# Patient Record
Sex: MALE | Race: WHITE | NOT HISPANIC OR LATINO | Employment: FULL TIME | URBAN - METROPOLITAN AREA
[De-identification: names, ages, dates, MRNs, and addresses within clinical notes are randomized per-mention and may not be internally consistent; named-entity substitution may affect disease eponyms.]

---

## 2020-02-12 ENCOUNTER — OFFICE VISIT (OUTPATIENT)
Dept: FAMILY MEDICINE | Facility: CLINIC | Age: 65
End: 2020-02-12
Payer: COMMERCIAL

## 2020-02-12 VITALS
OXYGEN SATURATION: 96 % | RESPIRATION RATE: 16 BRPM | HEART RATE: 66 BPM | SYSTOLIC BLOOD PRESSURE: 102 MMHG | WEIGHT: 223.6 LBS | TEMPERATURE: 97.8 F | BODY MASS INDEX: 33.12 KG/M2 | DIASTOLIC BLOOD PRESSURE: 68 MMHG | HEIGHT: 69 IN

## 2020-02-12 DIAGNOSIS — L21.9 SEBORRHEIC DERMATITIS: ICD-10-CM

## 2020-02-12 DIAGNOSIS — Z23 NEED FOR PNEUMOCOCCAL VACCINE: ICD-10-CM

## 2020-02-12 DIAGNOSIS — Z11.59 NEED FOR HEPATITIS C SCREENING TEST: ICD-10-CM

## 2020-02-12 DIAGNOSIS — Z00.00 ENCOUNTER FOR MEDICARE ANNUAL WELLNESS EXAM: Primary | ICD-10-CM

## 2020-02-12 DIAGNOSIS — I21.3 ST ELEVATION MYOCARDIAL INFARCTION (STEMI), UNSPECIFIED ARTERY (H): ICD-10-CM

## 2020-02-12 DIAGNOSIS — Z83.3 FAMILY HISTORY OF DIABETES MELLITUS: ICD-10-CM

## 2020-02-12 DIAGNOSIS — Z11.4 SCREENING FOR HIV (HUMAN IMMUNODEFICIENCY VIRUS): ICD-10-CM

## 2020-02-12 DIAGNOSIS — Z12.5 SCREENING FOR PROSTATE CANCER: ICD-10-CM

## 2020-02-12 DIAGNOSIS — I10 ESSENTIAL HYPERTENSION: ICD-10-CM

## 2020-02-12 DIAGNOSIS — E78.5 HYPERLIPIDEMIA LDL GOAL <100: ICD-10-CM

## 2020-02-12 PROCEDURE — 90670 PCV13 VACCINE IM: CPT | Performed by: NURSE PRACTITIONER

## 2020-02-12 PROCEDURE — 99387 INIT PM E/M NEW PAT 65+ YRS: CPT | Mod: 25 | Performed by: NURSE PRACTITIONER

## 2020-02-12 PROCEDURE — 90471 IMMUNIZATION ADMIN: CPT | Performed by: NURSE PRACTITIONER

## 2020-02-12 RX ORDER — METOPROLOL SUCCINATE 25 MG/1
25 TABLET, EXTENDED RELEASE ORAL DAILY
COMMUNITY
Start: 2019-05-09 | End: 2020-08-19

## 2020-02-12 RX ORDER — ALCLOMETASONE DIPROPIONATE 0.5 MG/G
CREAM TOPICAL 2 TIMES DAILY
Qty: 45 G | Refills: 0 | Status: SHIPPED | OUTPATIENT
Start: 2020-02-12 | End: 2020-05-07

## 2020-02-12 RX ORDER — TERAZOSIN 2 MG/1
2 CAPSULE ORAL DAILY
COMMUNITY
Start: 2020-01-08 | End: 2020-08-19

## 2020-02-12 RX ORDER — LISINOPRIL 10 MG/1
10 TABLET ORAL 2 TIMES DAILY
COMMUNITY
Start: 2019-08-06 | End: 2020-06-18

## 2020-02-12 RX ORDER — CLOPIDOGREL BISULFATE 75 MG/1
75 TABLET ORAL DAILY
COMMUNITY
Start: 2018-12-20 | End: 2020-02-13

## 2020-02-12 RX ORDER — NITROGLYCERIN 0.4 MG/1
0.4 TABLET SUBLINGUAL PRN
COMMUNITY
Start: 2019-12-02 | End: 2020-09-10

## 2020-02-12 RX ORDER — ATORVASTATIN CALCIUM 80 MG/1
80 TABLET, FILM COATED ORAL DAILY
COMMUNITY
Start: 2018-12-19 | End: 2020-08-19

## 2020-02-12 RX ORDER — DIPHENOXYLATE HYDROCHLORIDE AND ATROPINE SULFATE 2.5; .025 MG/1; MG/1
1 TABLET ORAL DAILY
COMMUNITY

## 2020-02-12 ASSESSMENT — ENCOUNTER SYMPTOMS
SHORTNESS OF BREATH: 0
NERVOUS/ANXIOUS: 0
FEVER: 0
EYE PAIN: 0
CHILLS: 0
HEADACHES: 0
WEAKNESS: 0
MYALGIAS: 0
ARTHRALGIAS: 0
ABDOMINAL PAIN: 0
CONSTIPATION: 0
SORE THROAT: 0
DYSURIA: 0
DIZZINESS: 0
FREQUENCY: 1
PARESTHESIAS: 1
COUGH: 0
NAUSEA: 0
DIARRHEA: 0
JOINT SWELLING: 0
HEARTBURN: 1
HEMATURIA: 0
HEMATOCHEZIA: 0
PALPITATIONS: 0

## 2020-02-12 ASSESSMENT — MIFFLIN-ST. JEOR: SCORE: 1791.11

## 2020-02-12 ASSESSMENT — ACTIVITIES OF DAILY LIVING (ADL): CURRENT_FUNCTION: NO ASSISTANCE NEEDED

## 2020-02-12 NOTE — NURSING NOTE
Prior to immunization administration, verified patients identity using patient s name and date of birth. Please see Immunization Activity for additional information.     Screening Questionnaire for Adult Immunization    Are you sick today?   No   Do you have allergies to medications, food, a vaccine component or latex?   No   Have you ever had a serious reaction after receiving a vaccination?   No   Do you have a long-term health problem with heart, lung, kidney, or metabolic disease (e.g., diabetes), asthma, a blood disorder, no spleen, complement component deficiency, a cochlear implant, or a spinal fluid leak?  Are you on long-term aspirin therapy?   Yes   Do you have cancer, leukemia, HIV/AIDS, or any other immune system problem?   No   Do you have a parent, brother, or sister with an immune system problem?   No   In the past 3 months, have you taken medications that affect  your immune system, such as prednisone, other steroids, or anticancer drugs; drugs for the treatment of rheumatoid arthritis, Crohn s disease, or psoriasis; or have you had radiation treatments?   No   Have you had a seizure, or a brain or other nervous system problem?   No   During the past year, have you received a transfusion of blood or blood    products, or been given immune (gamma) globulin or antiviral drug?   No   For women: Are you pregnant or is there a chance you could become       pregnant during the next month?   No   Have you received any vaccinations in the past 4 weeks?   No     Immunization questionnaire was positive for at least one answer.  Notified Dr. Cabello,     Patient instructed to remain in clinic for 15 minutes afterwards, and to report any adverse reaction to me immediately.       Screening performed by Sherlyn Valerio MA on 2/12/2020 at 12:37 PM.

## 2020-02-12 NOTE — PROGRESS NOTES
"SUBJECTIVE:   Pedrito Negron is a 64 year old male who presents for Preventive Visit.  {PVP to remind patient that this is not necessarily a physical exam; physical exam may or may not be done:919169::\"click delete button to remove this line now\"}  {PVP to inform patient that additional E&M charge may apply, if additional problems addressed:083234::\"click delete button to remove this line now\"}  Are you in the first 12 months of your Medicare coverage?  { :401989::\"No\"}    HPI  Do you feel safe in your environment? { :377941}    Have you ever done Advance Care Planning? (For example, a Health Directive, POLST, or a discussion with a medical provider or your loved ones about your wishes): { :779131}    {Hearing Test Done (Optional):933445}  Fall risk  { :698478}  {If any of the above assessments are answered yes, consider ordering appropriate referrals (Optional):985900::\"click delete button to remove this line now\"}  Cognitive Screening { :647992}    {Do you have sleep apnea, excessive snoring or daytime drowsiness? (Optional):544344}    Reviewed and updated as needed this visit by clinical staff         Reviewed and updated as needed this visit by Provider        Social History     Tobacco Use     Smoking status: Not on file   Substance Use Topics     Alcohol use: Not on file     {Rooming Staff- Complete this question if Prescreen response is not shown below for today's visit. If you drink alcohol do you typically have >3 drinks per day or >7 drinks per week? (Optional):792615}    No flowsheet data found.{add AUDIT responses (Optional) (A score of 7 for adult men is an indication of hazardous drinking; a score of 8 or more is an indication of an alcohol use disorder.  A score of 7 or more for adult women is an indication of hazardous drinking or an alchohol use disorder):168880}    {Outside tests to abstract? :672371}    {additional problems to add (Optional):239575}    Current providers sharing in care for this " "patient include: {Rooming staff:  Please update Care Team in Rooming Activity, refresh this note and then delete this statement}  Patient Care Team:  Vinay Garcia MD as PCP - General (Family Practice)    The following health maintenance items are reviewed in Epic and correct as of today:  Health Maintenance   Topic Date Due     HEPATITIS C SCREENING  1955     ADVANCE CARE PLANNING  1955     COLONOSCOPY  1964     HIV SCREENING  1969     LIPID  1989     MEDICARE ANNUAL WELLNESS VISIT  2019     FALL RISK ASSESSMENT  2019     AORTIC ANEURYSM SCREENING (SYSTEM ASSIGNED)  2019     PHQ-2  2020     PNEUMOCOCCAL IMMUNIZATION 65+ LOW/MEDIUM RISK (1 of 2 - PCV13) 2019     DTAP/TDAP/TD IMMUNIZATION (2 - Td) 2021     INFLUENZA VACCINE  Completed     ZOSTER IMMUNIZATION  Completed     IPV IMMUNIZATION  Aged Out     MENINGITIS IMMUNIZATION  Aged Out     {Chronicprobdata (optional):518451}  {Decision Support (Optional):783201}    Review of Systems  {ROS COMP (Optional):743761}    OBJECTIVE:   There were no vitals taken for this visit. There is no height or weight on file to calculate BMI.  Physical Exam  {Exam (Optional) :062826}    {Diagnostic Test Results (Optional):753393::\"Diagnostic Test Results:\",\"Labs reviewed in Epic\"}    ASSESSMENT / PLAN:   {Diag Picklist:003508}    COUNSELING:  {Medicare Counselin}    There is no height or weight on file to calculate BMI.    {Weight Management Plan (ACO) Complete if BMI is abnormal-  Ages 18-64  BMI >24.9.  Age 65+ with BMI <23 or >30 (Optional):449139}     has no history on file for tobacco.  {Tobacco Cessation -- Complete if patient is a smoker (Optional):712979}    Appropriate preventive services were discussed with this patient, including applicable screening as appropriate for cardiovascular disease, diabetes, osteopenia/osteoporosis, and glaucoma.  As appropriate for age/gender, discussed screening for " colorectal cancer, prostate cancer, breast cancer, and cervical cancer. Checklist reviewing preventive services available has been given to the patient.    Reviewed patients plan of care and provided an AVS. The {CarePlan:336524} for Pedrito meets the Care Plan requirement. This Care Plan has been established and reviewed with the {PATIENT, FAMILY MEMBER, CAREGIVER:182780}.    Counseling Resources:  ATP IV Guidelines  Pooled Cohorts Equation Calculator  Breast Cancer Risk Calculator  FRAX Risk Assessment  ICSI Preventive Guidelines  Dietary Guidelines for Americans, 2010  USDA's MyPlate  ASA Prophylaxis  Lung CA Screening    Lien Garcia, [facility]    Identified Health Risks:

## 2020-02-12 NOTE — PATIENT INSTRUCTIONS
Check your BP at home for one week on the log provided and follow-up for nurse visit - make sure you bring your at-home machine with you.  Fasting labs that same visit - ensure you have nothing to eat or drink after midnight.  Please follow-up with Dr. Garcia for additional concerns/lab results about one week after labs.    Please message me or Dr. Garcia after you have inventoried your medications and know which ones you need refills for.      Please call or return to clinic for any questions, concerns, or symptoms that are not improving or becoming worse.    Lein Garcia, CNP    Preventive Health Recommendations:     See your health care provider every year to    Review health changes.     Discuss preventive care.      Review your medicines if your doctor has prescribed any.      Talk with your health care provider about whether you should have a test to screen for prostate cancer (PSA).    Every 3 years, have a diabetes test (fasting glucose). If you are at risk for diabetes, you should have this test more often.    Every 5 years, have a cholesterol test. Have this test more often if you are at risk for high cholesterol or heart disease.     Every 10 years, have a colonoscopy. Or, have a yearly FIT test (stool test). These exams will check for colon cancer.    Talk to with your health care provider about screening for Abdominal Aortic Aneurysm if you have a family history of AAA or have a history of smoking.    Shots:     Get a flu shot each year.     Get a tetanus shot every 10 years.     Talk to your doctor about your pneumonia vaccines. There are now two you should receive - Pneumovax (PPSV 23) and Prevnar (PCV 13).     Talk to your pharmacist about a shingles vaccine.     Talk to your doctor about the hepatitis B vaccine.  Nutrition:     Eat at least 5 servings of fruits and vegetables each day.     Eat whole-grain bread, whole-wheat pasta and brown rice instead of white grains and rice.     Get  adequate Calcium and Vitamin D.   Lifestyle    Exercise for at least 150 minutes a week (30 minutes a day, 5 days a week). This will help you control your weight and prevent disease.     Limit alcohol to one drink per day.     No smoking.     Wear sunscreen to prevent skin cancer.    See your dentist every six months for an exam and cleaning.    See your eye doctor every 1 to 2 years to screen for conditions such as glaucoma, macular degeneration, cataracts, etc.    Personalized Prevention Plan  You are due for the preventive services outlined below.  Your care team is available to assist you in scheduling these services.  If you have already completed any of these items, please share that information with your care team to update in your medical record.  Health Maintenance Due   Topic Date Due     Hepatitis C Screening  1955     Discuss Advance Care Planning  1955     Colonoscopy  11/24/1964     Diptheria Tetanus Pertussis (DTAP/TDAP/TD) Vaccine (1 - Tdap) 11/24/1965     HIV Screening  11/24/1969     Cholesterol Lab  11/24/1989     Annual Wellness Visit  11/24/2019     FALL RISK ASSESSMENT  11/24/2019     AORTIC ANEURYSM SCREENING (SYSTEM ASSIGNED)  11/24/2019     PHQ-2  01/01/2020     Pneumococcal Vaccine (1 of 2 - PCV13) 11/24/2019     Patient Education   Personalized Prevention Plan  You are due for the preventive services outlined below.  Your care team is available to assist you in scheduling these services.  If you have already completed any of these items, please share that information with your care team to update in your medical record.  Health Maintenance Due   Topic Date Due     Hepatitis C Screening  1955     Discuss Advance Care Planning  1955     Colonoscopy  11/24/1964     HIV Screening  11/24/1969     Cholesterol Lab  11/24/1989     Annual Wellness Visit  11/24/2019     FALL RISK ASSESSMENT  11/24/2019     AORTIC ANEURYSM SCREENING (SYSTEM ASSIGNED)  11/24/2019     PHQ-2   01/01/2020     Pneumococcal Vaccine (1 of 2 - PCV13) 11/24/2019        Preventive Health Recommendations:     See your health care provider every year to    Review health changes.     Discuss preventive care.      Review your medicines if your doctor has prescribed any.      Talk with your health care provider about whether you should have a test to screen for prostate cancer (PSA).    Every 3 years, have a diabetes test (fasting glucose). If you are at risk for diabetes, you should have this test more often.    Every 5 years, have a cholesterol test. Have this test more often if you are at risk for high cholesterol or heart disease.     Every 10 years, have a colonoscopy. Or, have a yearly FIT test (stool test). These exams will check for colon cancer.    Talk to with your health care provider about screening for Abdominal Aortic Aneurysm if you have a family history of AAA or have a history of smoking.    Shots:     Get a flu shot each year.     Get a tetanus shot every 10 years.     Talk to your doctor about your pneumonia vaccines. There are now two you should receive - Pneumovax (PPSV 23) and Prevnar (PCV 13).     Talk to your pharmacist about a shingles vaccine.     Talk to your doctor about the hepatitis B vaccine.  Nutrition:     Eat at least 5 servings of fruits and vegetables each day.     Eat whole-grain bread, whole-wheat pasta and brown rice instead of white grains and rice.     Get adequate Calcium and Vitamin D.   Lifestyle    Exercise for at least 150 minutes a week (30 minutes a day, 5 days a week). This will help you control your weight and prevent disease.     Limit alcohol to one drink per day.     No smoking.     Wear sunscreen to prevent skin cancer.    See your dentist every six months for an exam and cleaning.    See your eye doctor every 1 to 2 years to screen for conditions such as glaucoma, macular degeneration, cataracts, etc.    Personalized Prevention Plan  You are due for the  preventive services outlined below.  Your care team is available to assist you in scheduling these services.  If you have already completed any of these items, please share that information with your care team to update in your medical record.  Health Maintenance Due   Topic Date Due     Hepatitis C Screening  1955     Discuss Advance Care Planning  1955     Colonoscopy  11/24/1964     HIV Screening  11/24/1969     Cholesterol Lab  11/24/1989     Annual Wellness Visit  11/24/2019     FALL RISK ASSESSMENT  11/24/2019     AORTIC ANEURYSM SCREENING (SYSTEM ASSIGNED)  11/24/2019     PHQ-2  01/01/2020     Pneumococcal Vaccine (1 of 2 - PCV13) 11/24/2019     Patient Education     Step-by-Step  Checking Your Blood Pressure    Date Last Reviewed: 4/27/2016 2000-2019 [employer]. [address] [place] [address], [place] [address]. All rights reserved. This information is not intended as a substitute for professional medical care. Always follow your healthcare professional's instructions.

## 2020-02-12 NOTE — PROGRESS NOTES
SUBJECTIVE:   CC: Pedrito Negron is an 64 year old male who presents for preventative health visit.     HPI  Ability to successfully perform activities of daily living: Yes, no assistance needed  Home safety:  none identified   Hearing impairment: No    Colonoscopy done on this date: 1/19/2016, by this group: [facility], results   Were:   Impression: - Patent end-to-side colo-colonic                        anastomosis, characterized by healthy                        appearing mucosa.                       - The examined portion of the ileum                        was normal.                       - Diverticulosis in the sigmoid                        colon, in the descending colon and in                        the ascending colon.                       - One 5 mm polyp in the ascending                        colon. Resected and retrieved.                       - One 3 mm polyp in the ascending                        colon. Resected and retrieved.                       - Two 4 to 5 mm polyps in the                        descending colon. Resected and                        retrieved.                       - One 5 mm polyp at the anastomosis.                        Resected and retrieved.                       - One 5 mm polyp in the rectum.                        Resected and retrieved.                       - The examination was otherwise                        normal.    Pathology Report 1/19/2016    Pathology #: [id number]                     Date Obtained: 01/19/2016                                                Date Received: 01/19/2016    DIAGNOSIS:   A. Colon, ascending, polypectomy:   1. Tubular adenoma, one piece.   2. Sessile serrated adenoma, one piece.     B. Colon, descending, polypectomy:   -  Hyperplastic polyp.     C. Colon, anastomosis, polypectomy:   -  Serrated polyp, favor a hyperplastic polyp.     D. Colon, rectum, polypectomy:   -  Hyperplastic polyp.    Recommendation:      -  "Await pathology results.                       - If the pathology report reveals                        adenomatous tissue, then repeat the                        colonoscopy for surveillance based on                        pathology results in 3 - 5 years.                       - No aspirin, ibuprofen, naproxen, or                        other non-steroidal anti-inflammatory                        drugs for 2 weeks after polyp removal.    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  - blood pressure readings at home 140/90, sometimes systolic of 150 - is asymptomatic   - he will take an extra Lisinopril   - no history of prostate cancer  - family history of diabetes (sister)  - on Plavix, has \"at least 3 stents\"  - no bleeding in stool      Today's PHQ-2 Score:   PHQ-2 (  [employer]) 2020   Q1: Little interest or pleasure in doing things 0   Q2: Feeling down, depressed or hopeless 0   PHQ-2 Score 0   Q1: Little interest or pleasure in doing things Not at all   Q2: Feeling down, depressed or hopeless Not at all   PHQ-2 Score 0       Abuse: Current or Past(Physical, Sexual or Emotional)- No  Do you feel safe in your environment? Yes    Have you ever done Advance Care Planning? (For example, a Health Directive, POLST, or a discussion with a medical provider or your loved ones about your wishes): No, advance care planning information given to patient to review.  Advanced care planning was discussed at today's visit.    Social History     Tobacco Use     Smoking status: Former Smoker     Types: Cigarettes     Last attempt to quit: 2001     Years since quittin.1     Smokeless tobacco: Never Used   Substance Use Topics     Alcohol use: Not Currently     If you drink alcohol do you typically have >3 drinks per day or >7 drinks per " week? No    Alcohol Use 2/12/2020   Prescreen: >3 drinks/day or >7 drinks/week? No   Prescreen: >3 drinks/day or >7 drinks/week? -       Last PSA: 5/18/2019 - 0.7    Reviewed orders with patient. Reviewed health maintenance and updated orders accordingly - Yes  - Patient is not fasting today, so will return to have them completed in 1-2 weeks.     Current Outpatient Medications   Medication Sig Dispense Refill     aspirin (ASA) 81 MG EC tablet Take 81 mg by mouth daily       atorvastatin (LIPITOR) 80 MG tablet Take 80 mg by mouth daily       clopidogrel (PLAVIX) 75 MG tablet Take 75 mg by mouth daily       lisinopril (PRINIVIL/ZESTRIL) 10 MG tablet Take 10 mg by mouth 2 times daily       metoprolol succinate ER (TOPROL-XL) 25 MG 24 hr tablet Take 25 mg by mouth daily       Multiple Vitamin (MULTI-VITAMINS) TABS Take 1 tablet by mouth daily       nitroGLYcerin (NITROSTAT) 0.4 MG sublingual tablet Place 0.4 mg under the tongue as needed One table under tongue as needed for chest pain- Inno relief after 5 minutes call 911; Use of 1 tablet every 5 mintues- Max of 3 tablets       omeprazole (PRILOSEC) 20 MG DR capsule Take 20 mg by mouth daily       terazosin (HYTRIN) 2 MG capsule Take 2 mg by mouth daily       No Known Allergies    Reviewed and updated as needed this visit by clinical staff  Tobacco  Allergies  Meds  Problems  Med Hx  Surg Hx  Fam Hx  Soc Hx          Reviewed and updated as needed this visit by Provider  Tobacco  Allergies  Meds  Problems  Med Hx  Surg Hx  Fam Hx        History reviewed. No pertinent past medical history.   History reviewed. No pertinent surgical history.    Review of Systems  CONSTITUTIONAL: NEGATIVE for fever, chills, change in weight  INTEGUMENTARY/SKIN: NEGATIVE for worrisome rashes, moles or lesions  EYES: NEGATIVE for vision changes or irritation  ENT: NEGATIVE for ear, mouth and throat problems  RESP: NEGATIVE for significant cough or SOB  CV: NEGATIVE for chest  "pain, palpitations or peripheral edema  GI: NEGATIVE for nausea, abdominal pain, heartburn, or change in bowel habits   male: negative for dysuria, hematuria, decreased urinary stream, erectile dysfunction, urethral discharge  MUSCULOSKELETAL: NEGATIVE for significant arthralgias or myalgia  NEURO: NEGATIVE for weakness, dizziness or paresthesias  PSYCHIATRIC: NEGATIVE for changes in mood or affect    OBJECTIVE:   /68   Pulse 66   Temp 97.8  F (36.6  C) (Temporal)   Resp 16   Ht 1.755 m (5' 9.09\")   Wt 101.4 kg (223 lb 9.6 oz)   SpO2 96%   BMI 32.93 kg/m      Physical Exam  GENERAL: healthy, alert and no distress  EYES: Eyes grossly normal to inspection, PERRL and conjunctivae and sclerae normal  HENT: ear canals and TM's normal, nose and mouth without ulcers or lesions  NECK: no adenopathy, no asymmetry, masses, or scars and thyroid normal to palpation  RESP: lungs clear to auscultation - no rales, rhonchi or wheezes  CV: regular rates and rhythm, normal S1 S2, no S3 or S4, no murmur, click or rub, peripheral pulses strong, no peripheral edema and no bruits heard over bilateral carotid arteries  ABDOMEN: soft, nontender, no hepatosplenomegaly, no masses and bowel sounds normal  MS: no gross musculoskeletal defects noted, no edema  SKIN: no suspicious lesions or rashes  NEURO: Normal strength and tone, mentation intact and speech normal  PSYCH: mentation appears normal, affect normal/bright    Diagnostic Test Results:  Labs reviewed in Epic  none     ASSESSMENT/PLAN:   1. Encounter for Medicare annual wellness exam  No abnormal exam findings.  The following labs are ordered for future as patient is not fasting today.  He is aware and made an appointment to have these completed on 2/19/2020.  - CBC with platelets and differential; Future  - Comprehensive metabolic panel; Future    2. Essential hypertension  Controlled.  Current medications include Lisinopril 10 mg BID and Metoprolol 25 mg daily.  No " changes made in medication/dose today.  He is going to keep a BP log for the next week and bring that back in with his BP machine for a nurse visit.  He does not need refills today.    3. Hyperlipidemia LDL goal <100  Controlled.  No change in medication today.  He does not need a refill at this time.   - Lipid panel reflex to direct LDL Fasting; Future  - Comprehensive metabolic panel; Future    4. ST elevation myocardial infarction (STEMI), unspecified artery (H)  Stable.  History of Inferior STEMI on 2/1/2019, s/p DIMITRI to CX, and DIMITRI x 2 to RCA, h/o CAD (prior LAD stent 2010).  Pedrito is on Plavix 75 mg daily, as well as ASA 81 mg.      5. Family history of diabetes mellitus  The following lab will be completed when he is fasting on 2/19/2020.  - Hemoglobin A1c; Future    6. Screening for HIV (human immunodeficiency virus)  Discussed one-time screening per CDC recommendation.  The following lab will be completed on 2/19/2020.  - HIV Screening; Future    7. Need for hepatitis C screening test  Discussed one-time screening for patients born between [birth year]67-1997 per CDC recommendation.  The following lab will be completed on 2/19/2020.  - Hepatitis C Screen Reflex to HCV RNA Quant and Genotype; Future    8. Need for pneumococcal vaccine  Administered today in clinic.  - Pneumococcal vaccine 13 valent (Prevnar)  - ADMIN:  Vaccine, Initial    9. Screening for prostate cancer  The following lab will be completed on 2/19/2020.  - PSA, screen; Future    10. Seborrheic dermatitis  Refilled the following prescription today originally prescribed by his dermatologist.  Advised to follow-up for symptoms that are not improving or becoming worse.  Patient voiced understanding and agreement with treatment plan.    - alclomethasone (ACLOVATE) 0.05 % external cream; Apply topically 2 times daily  Dispense: 45 g; Refill: 0    COUNSELING:   Reviewed preventive health counseling, as reflected in patient instructions       Regular  "exercise       Healthy diet/nutrition       Vision screening       Immunizations    Vaccinated for: Pneumococcal             Consider Hep C screening for patients born between 1945 and 1965       HIV screeninx in teen years, 1x in adult years, and at intervals if high risk       Colon cancer screening       Prostate cancer screening       Osteoporosis Prevention/Bone Health    Estimated body mass index is 32.93 kg/m  as calculated from the following:    Height as of this encounter: 1.755 m (5' 9.09\").    Weight as of this encounter: 101.4 kg (223 lb 9.6 oz).     Weight management plan: Discussed healthy diet and exercise guidelines     reports that he quit smoking about 19 years ago. His smoking use included cigarettes. He has never used smokeless tobacco.      Counseling Resources:  ATP IV Guidelines  Pooled Cohorts Equation Calculator  FRAX Risk Assessment  ICSI Preventive Guidelines  Dietary Guidelines for Americans, 2010  USDA's MyPlate  ASA Prophylaxis  Lung CA Screening    Lien Garcia, [facility] [place]  "

## 2020-02-13 ENCOUNTER — MYC MEDICAL ADVICE (OUTPATIENT)
Dept: FAMILY MEDICINE | Facility: CLINIC | Age: 65
End: 2020-02-13

## 2020-02-13 DIAGNOSIS — I21.3 ST ELEVATION MYOCARDIAL INFARCTION (STEMI), UNSPECIFIED ARTERY (H): Primary | ICD-10-CM

## 2020-02-13 RX ORDER — CLOPIDOGREL BISULFATE 75 MG/1
75 TABLET ORAL DAILY
Qty: 60 TABLET | Refills: 3 | Status: SHIPPED | OUTPATIENT
Start: 2020-02-13 | End: 2020-10-30

## 2020-02-13 NOTE — TELEPHONE ENCOUNTER
Pending Prescriptions:                       Disp   Refills    clopidogrel (PLAVIX) 75 MG tablet                             Sig: Take 1 tablet (75 mg) by mouth daily    Routing refill request to provider for review/approval because:  Medication is reported/historical  See mychart message from pt    Livia Menon, MSN, RN

## 2020-02-19 ENCOUNTER — ALLIED HEALTH/NURSE VISIT (OUTPATIENT)
Dept: FAMILY MEDICINE | Facility: CLINIC | Age: 65
End: 2020-02-19
Payer: COMMERCIAL

## 2020-02-19 VITALS — SYSTOLIC BLOOD PRESSURE: 132 MMHG | HEART RATE: 65 BPM | DIASTOLIC BLOOD PRESSURE: 82 MMHG

## 2020-02-19 DIAGNOSIS — Z00.00 ENCOUNTER FOR MEDICARE ANNUAL WELLNESS EXAM: ICD-10-CM

## 2020-02-19 DIAGNOSIS — Z11.59 NEED FOR HEPATITIS C SCREENING TEST: ICD-10-CM

## 2020-02-19 DIAGNOSIS — Z83.3 FAMILY HISTORY OF DIABETES MELLITUS: ICD-10-CM

## 2020-02-19 DIAGNOSIS — Z01.30 BLOOD PRESSURE CHECK: Primary | ICD-10-CM

## 2020-02-19 DIAGNOSIS — Z11.4 SCREENING FOR HIV (HUMAN IMMUNODEFICIENCY VIRUS): ICD-10-CM

## 2020-02-19 DIAGNOSIS — Z12.5 SCREENING FOR PROSTATE CANCER: ICD-10-CM

## 2020-02-19 DIAGNOSIS — E78.5 HYPERLIPIDEMIA LDL GOAL <100: ICD-10-CM

## 2020-02-19 LAB
ALBUMIN SERPL-MCNC: 3.8 G/DL (ref 3.4–5)
ALP SERPL-CCNC: 127 U/L (ref 40–150)
ALT SERPL W P-5'-P-CCNC: 39 U/L (ref 0–70)
ANION GAP SERPL CALCULATED.3IONS-SCNC: 6 MMOL/L (ref 3–14)
AST SERPL W P-5'-P-CCNC: 42 U/L (ref 0–45)
BASOPHILS # BLD AUTO: 0 10E9/L (ref 0–0.2)
BASOPHILS NFR BLD AUTO: 0.2 %
BILIRUB SERPL-MCNC: 0.5 MG/DL (ref 0.2–1.3)
BUN SERPL-MCNC: 22 MG/DL (ref 7–30)
CALCIUM SERPL-MCNC: 8.8 MG/DL (ref 8.5–10.1)
CHLORIDE SERPL-SCNC: 105 MMOL/L (ref 94–109)
CHOLEST SERPL-MCNC: 197 MG/DL
CO2 SERPL-SCNC: 28 MMOL/L (ref 20–32)
CREAT SERPL-MCNC: 1.16 MG/DL (ref 0.66–1.25)
DIFFERENTIAL METHOD BLD: NORMAL
EOSINOPHIL # BLD AUTO: 0.3 10E9/L (ref 0–0.7)
EOSINOPHIL NFR BLD AUTO: 5.2 %
ERYTHROCYTE [DISTWIDTH] IN BLOOD BY AUTOMATED COUNT: 12.3 % (ref 10–15)
GFR SERPL CREATININE-BSD FRML MDRD: 66 ML/MIN/{1.73_M2}
GLUCOSE SERPL-MCNC: 95 MG/DL (ref 70–99)
HBA1C MFR BLD: 5.1 % (ref 0–5.6)
HCT VFR BLD AUTO: 43.4 % (ref 40–53)
HCV AB SERPL QL IA: NONREACTIVE
HDLC SERPL-MCNC: 53 MG/DL
HGB BLD-MCNC: 14.8 G/DL (ref 13.3–17.7)
HIV 1+2 AB+HIV1 P24 AG SERPL QL IA: NONREACTIVE
LDLC SERPL CALC-MCNC: ABNORMAL MG/DL
LDLC SERPL DIRECT ASSAY-MCNC: 84 MG/DL
LYMPHOCYTES # BLD AUTO: 1.4 10E9/L (ref 0.8–5.3)
LYMPHOCYTES NFR BLD AUTO: 24 %
MCH RBC QN AUTO: 31.2 PG (ref 26.5–33)
MCHC RBC AUTO-ENTMCNC: 34.1 G/DL (ref 31.5–36.5)
MCV RBC AUTO: 92 FL (ref 78–100)
MONOCYTES # BLD AUTO: 0.7 10E9/L (ref 0–1.3)
MONOCYTES NFR BLD AUTO: 11.4 %
NEUTROPHILS # BLD AUTO: 3.5 10E9/L (ref 1.6–8.3)
NEUTROPHILS NFR BLD AUTO: 59.2 %
NONHDLC SERPL-MCNC: 144 MG/DL
PLATELET # BLD AUTO: 216 10E9/L (ref 150–450)
POTASSIUM SERPL-SCNC: 4.5 MMOL/L (ref 3.4–5.3)
PROT SERPL-MCNC: 8 G/DL (ref 6.8–8.8)
PSA SERPL-ACNC: 1.04 UG/L (ref 0–4)
RBC # BLD AUTO: 4.74 10E12/L (ref 4.4–5.9)
SODIUM SERPL-SCNC: 139 MMOL/L (ref 133–144)
TRIGL SERPL-MCNC: 535 MG/DL
WBC # BLD AUTO: 6 10E9/L (ref 4–11)

## 2020-02-19 PROCEDURE — 87389 HIV-1 AG W/HIV-1&-2 AB AG IA: CPT | Performed by: NURSE PRACTITIONER

## 2020-02-19 PROCEDURE — 85025 COMPLETE CBC W/AUTO DIFF WBC: CPT | Performed by: NURSE PRACTITIONER

## 2020-02-19 PROCEDURE — 36415 COLL VENOUS BLD VENIPUNCTURE: CPT | Performed by: NURSE PRACTITIONER

## 2020-02-19 PROCEDURE — 99207 ZZC NO CHARGE NURSE ONLY: CPT

## 2020-02-19 PROCEDURE — 80061 LIPID PANEL: CPT | Performed by: NURSE PRACTITIONER

## 2020-02-19 PROCEDURE — 83036 HEMOGLOBIN GLYCOSYLATED A1C: CPT | Performed by: NURSE PRACTITIONER

## 2020-02-19 PROCEDURE — 86803 HEPATITIS C AB TEST: CPT | Performed by: NURSE PRACTITIONER

## 2020-02-19 PROCEDURE — 80053 COMPREHEN METABOLIC PANEL: CPT | Performed by: NURSE PRACTITIONER

## 2020-02-19 PROCEDURE — G0103 PSA SCREENING: HCPCS | Performed by: NURSE PRACTITIONER

## 2020-02-19 PROCEDURE — 83721 ASSAY OF BLOOD LIPOPROTEIN: CPT | Mod: 59 | Performed by: NURSE PRACTITIONER

## 2020-02-19 RX ORDER — KETOCONAZOLE 20 MG/ML
120 SHAMPOO TOPICAL
COMMUNITY
Start: 2020-02-13 | End: 2020-09-04

## 2020-02-19 NOTE — NURSING NOTE
Pedrito Negron is a 64 year old patient who comes in today for a Blood Pressure check.  Initial BP:  /82   Pulse 65      65  Disposition: follow-up as previously indicated by provider and results routed to provider

## 2020-02-21 ENCOUNTER — TELEPHONE (OUTPATIENT)
Dept: FAMILY MEDICINE | Facility: CLINIC | Age: 65
End: 2020-02-21

## 2020-02-21 NOTE — TELEPHONE ENCOUNTER
Left detailed message informing patient.           Lien Garcia CNP Alderman, Diane M, CNP             Can you please call Pedrito with lab results.....     His blood count, blood sugar, electrolytes, kidney function, liver function are all normal.  No concerns.   PSA, HIV, and Hepatitis C screening are all negative as well.     Lipid panel shows elevated Triglycerides.  I'd like him to ensure he is avoiding alcohol, and increasing lean protein (chicken, fish), fruits, vegetables, and whole grains in his diet.   I'd also like him to follow-up with Dr. Garcia in a couple of weeks.     Thank you,   Lien Garcia, [place]

## 2020-02-26 PROBLEM — E78.5 HYPERLIPIDEMIA LDL GOAL <100: Status: ACTIVE | Noted: 2020-02-26

## 2020-02-26 PROBLEM — I10 ESSENTIAL HYPERTENSION: Status: ACTIVE | Noted: 2020-02-26

## 2020-03-10 ENCOUNTER — TELEPHONE (OUTPATIENT)
Dept: FAMILY MEDICINE | Facility: CLINIC | Age: 65
End: 2020-03-10

## 2020-03-10 NOTE — TELEPHONE ENCOUNTER
Please abstract the following data from this visit with this patient into the appropriate field in Epic:    Tests that can be patient reported without a hard copy:    Colonoscopy done on this date: 01/19/2016 (approximately), by this group: [facility], results in care everywhere.     Other Tests found in the patient's chart through Chart Review/Care Everywhere:        Note to Abstraction: If this section is blank, no results were found via Chart Review/Care Everywhere.        Summary:    Patient is due/failing the following:   COLONOSCOPY    Action needed:   Schedule a colonoscopy or complete a FIT test    Type of outreach:    none per care everywhere UTD    Questions for provider review:    None                                                                                                                                    Karen Rosen CMA       Chart routed to Care Team .

## 2020-05-07 ENCOUNTER — MYC REFILL (OUTPATIENT)
Dept: FAMILY MEDICINE | Facility: CLINIC | Age: 65
End: 2020-05-07

## 2020-05-07 DIAGNOSIS — L21.9 SEBORRHEIC DERMATITIS: ICD-10-CM

## 2020-05-08 RX ORDER — ALCLOMETASONE DIPROPIONATE 0.5 MG/G
CREAM TOPICAL 2 TIMES DAILY
Qty: 45 G | Refills: 0 | Status: SHIPPED | OUTPATIENT
Start: 2020-05-08 | End: 2021-12-31

## 2020-05-08 NOTE — TELEPHONE ENCOUNTER
Prescription approved per [facility] Refill Protocol.  Sunil Pierre, RN, BSN

## 2020-06-18 ENCOUNTER — TELEPHONE (OUTPATIENT)
Dept: FAMILY MEDICINE | Facility: CLINIC | Age: 65
End: 2020-06-18

## 2020-06-18 DIAGNOSIS — I10 ESSENTIAL HYPERTENSION: Primary | ICD-10-CM

## 2020-06-18 NOTE — TELEPHONE ENCOUNTER
Pending Prescriptions:                       Disp   Refills    lisinopril (ZESTRIL) 10 MG tablet                             Sig: Take 1 tablet (10 mg) by mouth 2 times daily    Karen Rosen CMA

## 2020-06-18 NOTE — TELEPHONE ENCOUNTER
Pending Prescriptions:                       Disp   Refills    lisinopril (ZESTRIL) 10 MG tablet                          Sig: Take 1 tablet (10 mg) by mouth 2 times daily    Routing refill request to provider for review/approval because:  Medication is reported/historica

## 2020-06-18 NOTE — LETTER
[facility] [place]  [address]  [place] [address]  [phone]        June 23, 2020    Pedrito Negron                                                                                                                     [id number]   [place] [address]          Dear Pedrito,    We have attempted to contact you by telephone without success.  We are unable to send a refill  for the medication lisinopril to the pharmacy for you.  In order to continue refilling this medication, we request that you schedule a video appointment with your Provider.  You can call us at [phone] to schedule this appointment.    Thank you,  [facility] [place] Support Staff / Donna

## 2020-06-19 RX ORDER — LISINOPRIL 10 MG/1
10 TABLET ORAL 2 TIMES DAILY
Qty: 180 TABLET | Refills: 1 | Status: SHIPPED | OUTPATIENT
Start: 2020-06-19 | End: 2020-06-25

## 2020-06-19 NOTE — TELEPHONE ENCOUNTER
LMTC, please assist      Please schedule follow-up appointment with me.     Vinay Garcia MD, FAAFP   Family Medicine Physician   [facility] [place]   [address] [place] [address]         Thanks  Hien Garcia RT (R)

## 2020-06-24 NOTE — PROGRESS NOTES
"Pedrito Negron is a 64 year old male who is being evaluated via a billable telephone visit.      The patient has been notified of following:     \"This telephone visit will be conducted via a call between you and your physician/provider. We have found that certain health care needs can be provided without the need for a physical exam.  This service lets us provide the care you need with a short phone conversation.  If a prescription is necessary we can send it directly to your pharmacy.  If lab work is needed we can place an order for that and you can then stop by our lab to have the test done at a later time.    Telephone visits are billed at different rates depending on your insurance coverage. During this emergency period, for some insurers they may be billed the same as an in-person visit.  Please reach out to your insurance provider with any questions.    If during the course of the call the physician/provider feels a telephone visit is not appropriate, you will not be charged for this service.\"    Patient has given verbal consent for Telephone visit?  Yes    What phone number would you like to be contacted at? [phone]    How would you like to obtain your AVS? MyChart    Subjective     Pedrito Negron is a 64 year old male who presents via phone visit today for the following health issues:    HPI  Concern - follow up lab (he is not sure)  Onset: NA    Description:  Thinks it is about triglycerides     Intensity: mild    Progression of Symptoms:  Higher levels    Accompanying Signs & Symptoms:  NA    Previous history of similar problem:   no    Precipitating factors:   Worsened by: NA    Alleviating factors:  Improved by: NA    Therapies Tried and outcome: NA  Patient Active Problem List   Diagnosis     Coronary atherosclerosis     H/O acute myocardial infarction     H/O diverticulitis of colon     H/O ETOH abuse     STEMI (ST elevation myocardial infarction) (H)     Hyperlipidemia LDL goal <100     Essential " hypertension     History reviewed. No pertinent surgical history.    Social History     Tobacco Use     Smoking status: Former Smoker     Types: Cigarettes     Last attempt to quit:      Years since quittin.4     Smokeless tobacco: Never Used   Substance Use Topics     Alcohol use: Not Currently     History reviewed. No pertinent family history.      Current Outpatient Medications   Medication Sig Dispense Refill     alclomethasone (ACLOVATE) 0.05 % external cream Apply topically 2 times daily 45 g 0     aspirin (ASA) 81 MG EC tablet Take 81 mg by mouth daily       clopidogrel (PLAVIX) 75 MG tablet Take 1 tablet (75 mg) by mouth daily 60 tablet 3     ketoconazole 2 % EX external shampoo 120 mLs       lisinopril (ZESTRIL) 20 MG tablet Take 1 tablet (20 mg) by mouth daily 90 tablet 3     Multiple Vitamin (MULTI-VITAMINS) TABS Take 1 tablet by mouth daily       nitroGLYcerin (NITROSTAT) 0.4 MG sublingual tablet Place 0.4 mg under the tongue as needed One table under tongue as needed for chest pain- Inno relief after 5 minutes call 911; Use of 1 tablet every 5 mintues- Max of 3 tablets       omeprazole (PRILOSEC) 20 MG DR capsule Take 20 mg by mouth daily       atorvastatin (LIPITOR) 80 MG tablet Take 80 mg by mouth daily       metoprolol succinate ER (TOPROL-XL) 25 MG 24 hr tablet Take 25 mg by mouth daily       terazosin (HYTRIN) 2 MG capsule Take 2 mg by mouth daily       No Known Allergies  Recent Labs   Lab Test 20  0759   A1C 5.1   LDL Cannot estimate LDL when triglyceride exceeds 400 mg/dL  84   HDL 53   TRIG 535*   ALT 39   CR 1.16   GFRESTIMATED 66   GFRESTBLACK 76   POTASSIUM 4.5      BP Readings from Last 3 Encounters:   20 132/82   20 102/68    Wt Readings from Last 3 Encounters:   20 101.4 kg (223 lb 9.6 oz)                    Reviewed and updated as needed this visit by Provider         Review of Systems   Constitutional, HEENT, cardiovascular, pulmonary, gi and gu systems  are negative, except as otherwise noted.       Objective   Reported vitals:  There were no vitals taken for this visit.   healthy, alert and no distress  PSYCH: Alert and oriented times 3; coherent speech, normal   rate and volume, able to articulate logical thoughts, able   to abstract reason, no tangential thoughts, no hallucinations   or delusions  His affect is normal, pleasant and full  RESP: No cough, no audible wheezing, able to talk in full sentences  Remainder of exam unable to be completed due to telephone visits    Diagnostic Test Results:  Labs reviewed in Epic      ASSESSMENT and PLAN    1. Essential hypertension  64-year-old male with history of hypertension, hyperlipidemia, myocardial infarction 1.5 years ago.  He recently changed providers.  He has not been taking the atorvastatin.  We discussed his medications, we will obtain labs, he will follow-up face-to-face with me in 2 weeks.  We will review with his medications and make the appropriate recommendations.  He has been taking his blood pressure at home and his blood pressure has been at goal less than 140/90.  - **Comprehensive metabolic panel FUTURE anytime; Future  - lisinopril (ZESTRIL) 20 MG tablet; Take 1 tablet (20 mg) by mouth daily  Dispense: 90 tablet; Refill: 3    2. Hyperlipidemia LDL goal <100  His triglycerides were markedly elevated last visit.  We will repeat that today.  Anticipate restarting a statin, he would like to get labs prior to doing this.  - Lipid panel reflex to direct LDL Fasting; Future    3. H/O acute myocardial infarction  ACE inhibitor and beta-blocker would be beneficial.  He has not been taking his beta-blocker, we will further evaluate when we meet face-to-face.  - lisinopril (ZESTRIL) 20 MG tablet; Take 1 tablet (20 mg) by mouth daily  Dispense: 90 tablet; Refill: 3    Return in about 2 weeks (around 7/9/2020) for Annual Well Check.     Vinay Garcia MD, FAAFP  Family Medicine Physician  [facility]  [place]  [address] [place] [address]        Phone call duration:  11 minutes    Vinay Garcia MD

## 2020-06-25 ENCOUNTER — VIRTUAL VISIT (OUTPATIENT)
Dept: FAMILY MEDICINE | Facility: OTHER | Age: 65
End: 2020-06-25
Payer: COMMERCIAL

## 2020-06-25 DIAGNOSIS — I10 ESSENTIAL HYPERTENSION: Primary | ICD-10-CM

## 2020-06-25 DIAGNOSIS — E78.5 HYPERLIPIDEMIA LDL GOAL <100: ICD-10-CM

## 2020-06-25 DIAGNOSIS — I25.2 H/O ACUTE MYOCARDIAL INFARCTION: ICD-10-CM

## 2020-06-25 PROCEDURE — 99214 OFFICE O/P EST MOD 30 MIN: CPT | Mod: TEL | Performed by: FAMILY MEDICINE

## 2020-06-25 RX ORDER — LISINOPRIL 20 MG/1
20 TABLET ORAL DAILY
Qty: 90 TABLET | Refills: 3 | Status: SHIPPED | OUTPATIENT
Start: 2020-06-25 | End: 2020-09-04

## 2020-06-25 NOTE — PATIENT INSTRUCTIONS
Pedrito,    It was great seeing you in clinic today.  I summarized our discussion and your plan below.  Please let me know if you have any questions or concerns.  Please follow up with me as discussed in clinic or sooner if any worsening or additional concerns.     1. Essential hypertension  64-year-old male with history of hypertension, hyperlipidemia, myocardial infarction 1.5 years ago.  He recently changed providers.  He has not been taking the atorvastatin.  We discussed his medications, we will obtain labs, he will follow-up face-to-face with me in 2 weeks.  We will review with his medications and make the appropriate recommendations.  He has been taking his blood pressure at home and his blood pressure has been at goal less than 140/90.  - **Comprehensive metabolic panel FUTURE anytime; Future  - lisinopril (ZESTRIL) 20 MG tablet; Take 1 tablet (20 mg) by mouth daily  Dispense: 90 tablet; Refill: 3    2. Hyperlipidemia LDL goal <100  His triglycerides were markedly elevated last visit.  We will repeat that today.  Anticipate restarting a statin, he would like to get labs prior to doing this.  - Lipid panel reflex to direct LDL Fasting; Future    3. H/O acute myocardial infarction  ACE inhibitor and beta-blocker would be beneficial.  He has not been taking his beta-blocker, we will further evaluate when we meet face-to-face.  - lisinopril (ZESTRIL) 20 MG tablet; Take 1 tablet (20 mg) by mouth daily  Dispense: 90 tablet; Refill: 3       Sincerely,  Dr. SAIDA Garcia MD, FAAFP  Family Medicine Physician  [facility] [place]  [address], [place] [address]    Patient Education

## 2020-07-10 ENCOUNTER — TELEPHONE (OUTPATIENT)
Dept: FAMILY MEDICINE | Facility: CLINIC | Age: 65
End: 2020-07-10

## 2020-07-10 DIAGNOSIS — E78.5 HYPERLIPIDEMIA LDL GOAL <100: ICD-10-CM

## 2020-07-10 DIAGNOSIS — I10 ESSENTIAL HYPERTENSION: ICD-10-CM

## 2020-07-10 LAB
ALBUMIN SERPL-MCNC: 3.4 G/DL (ref 3.4–5)
ALP SERPL-CCNC: 93 U/L (ref 40–150)
ALT SERPL W P-5'-P-CCNC: 53 U/L (ref 0–70)
ANION GAP SERPL CALCULATED.3IONS-SCNC: 4 MMOL/L (ref 3–14)
AST SERPL W P-5'-P-CCNC: 44 U/L (ref 0–45)
BILIRUB SERPL-MCNC: 0.5 MG/DL (ref 0.2–1.3)
BUN SERPL-MCNC: 14 MG/DL (ref 7–30)
CALCIUM SERPL-MCNC: 8.2 MG/DL (ref 8.5–10.1)
CHLORIDE SERPL-SCNC: 106 MMOL/L (ref 94–109)
CHOLEST SERPL-MCNC: 258 MG/DL
CO2 SERPL-SCNC: 29 MMOL/L (ref 20–32)
CREAT SERPL-MCNC: 1.19 MG/DL (ref 0.66–1.25)
GFR SERPL CREATININE-BSD FRML MDRD: 63 ML/MIN/{1.73_M2}
GLUCOSE SERPL-MCNC: 110 MG/DL (ref 70–99)
HDLC SERPL-MCNC: 46 MG/DL
LDLC SERPL CALC-MCNC: 151 MG/DL
NONHDLC SERPL-MCNC: 212 MG/DL
POTASSIUM SERPL-SCNC: 4.7 MMOL/L (ref 3.4–5.3)
PROT SERPL-MCNC: 7.7 G/DL (ref 6.8–8.8)
SODIUM SERPL-SCNC: 139 MMOL/L (ref 133–144)
TRIGL SERPL-MCNC: 305 MG/DL

## 2020-07-10 PROCEDURE — 80053 COMPREHEN METABOLIC PANEL: CPT | Performed by: FAMILY MEDICINE

## 2020-07-10 PROCEDURE — 80061 LIPID PANEL: CPT | Performed by: FAMILY MEDICINE

## 2020-07-10 PROCEDURE — 36415 COLL VENOUS BLD VENIPUNCTURE: CPT | Performed by: FAMILY MEDICINE

## 2020-07-10 NOTE — TELEPHONE ENCOUNTER
----- Message from Taurus Lane MD sent at 7/10/2020  2:37 PM CDT -----  Patient with elevated total and ldl cholesterol. Blood sugar slightly elevated. Dr. Garcia plans to go over these in person at next appointment on 7/14 per his last note.

## 2020-07-13 NOTE — TELEPHONE ENCOUNTER
Left message asking patient to return call.    Please inform pt of message below AND the one at the bottom by Melissa.  Thanks.    Please advise patient that his cholesterol and triglycerides remain elevated.  Please schedule face-to-face visit with me in the next 2 weeks to further discuss and develop a plan.  I would like to conduct an exam at that time.     Vinay Garcia MD, FAAFP

## 2020-07-13 NOTE — RESULT ENCOUNTER NOTE
Please advise patient that his cholesterol and triglycerides remain elevated.  Please schedule face-to-face visit with me in the next 2 weeks to further discuss and develop a plan.  I would like to conduct an exam at that time.    Vinay Garcia MD, FAAFP  Family Medicine Physician  [facility] [place]  [address] [address] [place] [address]

## 2020-07-15 NOTE — PROGRESS NOTES
Subjective     Pedrito Negron is a 64 year old male who presents to clinic today for the following health issues:    HPI       Concern - follow up from labs    Patient here to follow up on elevated cholesterol and triglycerides from 1 week ago. Would like to develop plan.      Patient Active Problem List   Diagnosis     Coronary atherosclerosis     H/O acute myocardial infarction     H/O diverticulitis of colon     H/O ETOH abuse     STEMI (ST elevation myocardial infarction) (H)     Hyperlipidemia LDL goal <100     Essential hypertension     History reviewed. No pertinent surgical history.    Social History     Tobacco Use     Smoking status: Former Smoker     Types: Cigarettes     Last attempt to quit:      Years since quittin.5     Smokeless tobacco: Never Used   Substance Use Topics     Alcohol use: Not Currently     History reviewed. No pertinent family history.      Current Outpatient Medications   Medication Sig Dispense Refill     alclomethasone (ACLOVATE) 0.05 % external cream Apply topically 2 times daily 45 g 0     aspirin (ASA) 81 MG EC tablet Take 81 mg by mouth daily       atorvastatin (LIPITOR) 40 MG tablet Take 1 tablet (40 mg) by mouth daily 90 tablet 3     clopidogrel (PLAVIX) 75 MG tablet Take 1 tablet (75 mg) by mouth daily 60 tablet 3     ketoconazole 2 % EX external shampoo 120 mLs       lisinopril (ZESTRIL) 20 MG tablet Take 1 tablet (20 mg) by mouth daily 90 tablet 3     Multiple Vitamin (MULTI-VITAMINS) TABS Take 1 tablet by mouth daily       nitroGLYcerin (NITROSTAT) 0.4 MG sublingual tablet Place 0.4 mg under the tongue as needed One table under tongue as needed for chest pain- Inno relief after 5 minutes call 911; Use of 1 tablet every 5 mintues- Max of 3 tablets       omeprazole (PRILOSEC) 20 MG DR capsule Take 20 mg by mouth daily       atorvastatin (LIPITOR) 80 MG tablet Take 80 mg by mouth daily       metoprolol succinate ER (TOPROL-XL) 25 MG 24 hr tablet Take 25 mg by mouth  daily       terazosin (HYTRIN) 2 MG capsule Take 2 mg by mouth daily       No Known Allergies  Recent Labs   Lab Test 07/10/20  0912 02/19/20  0759   A1C  --  5.1   * Cannot estimate LDL when triglyceride exceeds 400 mg/dL  84   HDL 46 53   TRIG 305* 535*   ALT 53 39   CR 1.19 1.16   GFRESTIMATED 63 66   GFRESTBLACK 74 76   POTASSIUM 4.7 4.5      BP Readings from Last 3 Encounters:   07/17/20 122/76   02/19/20 132/82   02/12/20 102/68    Wt Readings from Last 3 Encounters:   07/17/20 102.3 kg (225 lb 8 oz)   02/12/20 101.4 kg (223 lb 9.6 oz)                    Reviewed and updated as needed this visit by Provider         Review of Systems   Constitutional, HEENT, cardiovascular, pulmonary, gi and gu systems are negative, except as otherwise noted.      Objective    /76   Pulse 92   Temp 97.7  F (36.5  C) (Temporal)   Resp 14   Wt 102.3 kg (225 lb 8 oz)   BMI 33.21 kg/m    Body mass index is 33.21 kg/m .  Physical Exam   GENERAL: healthy, alert and no distress  EYES: Eyes grossly normal to inspection, PERRL and conjunctivae and sclerae normal  HENT: ear canals and TM's normal, nose and mouth without ulcers or lesions  NECK: no adenopathy, no asymmetry, masses, or scars and thyroid normal to palpation  RESP: lungs clear to auscultation - no rales, rhonchi or wheezes  CV: regular rate and rhythm, normal S1 S2, no S3 or S4, no murmur, click or rub, no peripheral edema and peripheral pulses strong  ABDOMEN: soft, nontender, no hepatosplenomegaly, no masses and bowel sounds normal  MS: no gross musculoskeletal defects noted, no edema  NEURO: Normal strength and tone, mentation intact and speech normal  PSYCH: mentation appears normal, affect normal/bright    Diagnostic Test Results:  Labs reviewed in Epic        Assessment & Plan     1. Essential hypertension  64-year-old male with history of STEMI, he has history of hypertension, hyperlipidemia.  In the past he ran out of his atorvastatin and stopped.   Blood pressure currently at goal.  - Lipid panel reflex to direct LDL Fasting; Future  - **Basic metabolic panel FUTURE anytime; Future  - atorvastatin (LIPITOR) 40 MG tablet; Take 1 tablet (40 mg) by mouth daily  Dispense: 90 tablet; Refill: 3    2. Hyperlipidemia LDL goal <100  Refilled atorvastatin, will repeat lipid panel in 3 months.  - Lipid panel reflex to direct LDL Fasting; Future  - **Basic metabolic panel FUTURE anytime; Future  - atorvastatin (LIPITOR) 40 MG tablet; Take 1 tablet (40 mg) by mouth daily  Dispense: 90 tablet; Refill: 3    3. ST elevation myocardial infarction (STEMI), unspecified artery (H)  He is currently on an ACE inhibitor, discussed the importance of continuing statin.  Patient agrees.        Return in about 3 months (around 10/17/2020) for Repeat Labs.    Vinay Garcia MD  [facility]

## 2020-07-17 ENCOUNTER — OFFICE VISIT (OUTPATIENT)
Dept: FAMILY MEDICINE | Facility: OTHER | Age: 65
End: 2020-07-17
Payer: COMMERCIAL

## 2020-07-17 VITALS
DIASTOLIC BLOOD PRESSURE: 76 MMHG | RESPIRATION RATE: 14 BRPM | SYSTOLIC BLOOD PRESSURE: 122 MMHG | TEMPERATURE: 97.7 F | WEIGHT: 225.5 LBS | BODY MASS INDEX: 33.21 KG/M2 | HEART RATE: 92 BPM

## 2020-07-17 DIAGNOSIS — I21.3 ST ELEVATION MYOCARDIAL INFARCTION (STEMI), UNSPECIFIED ARTERY (H): ICD-10-CM

## 2020-07-17 DIAGNOSIS — E78.5 HYPERLIPIDEMIA LDL GOAL <100: ICD-10-CM

## 2020-07-17 DIAGNOSIS — I10 ESSENTIAL HYPERTENSION: Primary | ICD-10-CM

## 2020-07-17 PROCEDURE — 99214 OFFICE O/P EST MOD 30 MIN: CPT | Performed by: FAMILY MEDICINE

## 2020-07-17 RX ORDER — ATORVASTATIN CALCIUM 40 MG/1
40 TABLET, FILM COATED ORAL DAILY
Qty: 90 TABLET | Refills: 3 | Status: SHIPPED | OUTPATIENT
Start: 2020-07-17 | End: 2020-09-04

## 2020-07-17 ASSESSMENT — PAIN SCALES - GENERAL: PAINLEVEL: NO PAIN (0)

## 2020-07-17 NOTE — PATIENT INSTRUCTIONS
Patient Education     Understanding Seborrheic Dermatitis    Seborrheic dermatitis is a common type of rash that causes red, scaly, greasy skin. It occurs on skin that has oil glands. These include the face, upper chest, and scalp, where it is often called dandruff. It tends to last a long time, or go away and come back. Seborrheic dermatitis is not spread from person to person.   How to say it  [id number]  What causes seborrheic dermatitis?  Experts don't know what causes it. It may be partly caused by a type of yeast that grows on skin, along with extra oil production. Experts are still learning more. It s more common in men than women, and it can occur at any age. It happens more often in people with HIV/AIDS, Parkinson disease, alcoholic pancreatitis, hepatitis, or cancer. It can also get worse during times of stress.  Symptoms of seborrheic dermatitis  Symptoms can include skin that is:    Bumpy    Covered with yellow scales or crusts    Cracked    Greasy    Itchy    Leaking fluid    Painful    Red or orange  These symptoms can occur on skin:    Around the nose    Behind the ears    In the beard    In the eyebrows    On the scalp, also known as dandruff    On the upper chest  You may also have acne, inflamed eyelids (blepharitis), or other skin conditions at the same time.  Treatment for seborrheic dermatitis  Treatment can reduce symptoms for a period of time. The types of treatments most often used include:    Antifungal shampoo, body wash, or cream. These contain medicines such as ketoconazole, fluconazole, selenium sulfide, ciclopirox, pyrithione zinc, or tea tree oil.    Corticosteroid cream or ointment. These contain medicines such as hydrocortisone.    Calcineurin inhibitor cream or ointment. These contain medicines such as pimecrolimus or tacrolimus.    Shampoo or cream with other medicines. These contain medicines such as coal tar, salicylic acid, or zinc pyrithione.    Sodium  sulfacetemide creams and washes. These may also help.  In some cases one treatment will stop working after a while. Switching between treatments every few months may be helpful.   Wash your skin gently. You can remove scales with oil and gentle rubbing or a brush.  Living with seborrheic dermatitis  Seborrheic dermatitis is an ongoing (chronic) condition. It can go away and then come back. You will likely need to use shampoo, cream, or ointment with medicine once or twice a week. This can help to keep symptoms from coming back or getting worse.  When to call your healthcare provider  Call your healthcare provider right away if you have any of these:    Symptoms that don t get better, or get worse    New symptoms  Date Last Reviewed: 5/1/2016 2000-2019 [employer]. [address] [place] [address], [place] [address]. All rights reserved. This information is not intended as a substitute for professional medical care. Always follow your healthcare professional's instructions.

## 2020-08-18 NOTE — PROGRESS NOTES
Subjective     Pedrito Negron is a 64 year old male who presents to clinic today for the following health issues:    HPI   Patient would like to discuss blood in stools. He noticed blood in his stools on evening of 8/10 and cleared up by . He states that he had GERD-like symptoms and took antacids which did not help. Patient states that on 8/10, he noticed tightness in his chest. He said it seems like GERD, but when he is walking or carrying items, he can feel tightness across his chest. When he rests, the tightness stops. He also notices it when he lays down.     Patient Active Problem List   Diagnosis     Coronary atherosclerosis     H/O acute myocardial infarction     H/O diverticulitis of colon     H/O ETOH abuse     STEMI (ST elevation myocardial infarction) (H)     Hyperlipidemia LDL goal <100     Essential hypertension     History reviewed. No pertinent surgical history.    Social History     Tobacco Use     Smoking status: Former Smoker     Types: Cigarettes     Last attempt to quit:      Years since quittin.6     Smokeless tobacco: Never Used   Substance Use Topics     Alcohol use: Not Currently     History reviewed. No pertinent family history.      Current Outpatient Medications   Medication Sig Dispense Refill     alclomethasone (ACLOVATE) 0.05 % external cream Apply topically 2 times daily 45 g 0     aspirin (ASA) 81 MG EC tablet Take 81 mg by mouth daily       atorvastatin (LIPITOR) 40 MG tablet Take 1 tablet (40 mg) by mouth daily 90 tablet 3     clopidogrel (PLAVIX) 75 MG tablet Take 1 tablet (75 mg) by mouth daily 60 tablet 3     ketoconazole 2 % EX external shampoo 120 mLs       lisinopril (ZESTRIL) 20 MG tablet Take 1 tablet (20 mg) by mouth daily 90 tablet 3     Multiple Vitamin (MULTI-VITAMINS) TABS Take 1 tablet by mouth daily       nitroGLYcerin (NITROSTAT) 0.4 MG sublingual tablet Place 0.4 mg under the tongue as needed One table under tongue as needed for chest pain- Inno relief  "after 5 minutes call 911; Use of 1 tablet every 5 mintues- Max of 3 tablets       omeprazole (PRILOSEC) 20 MG DR capsule Take 20 mg by mouth daily       No Known Allergies  Recent Labs   Lab Test 07/10/20  0912 02/19/20  0759   A1C  --  5.1   * Cannot estimate LDL when triglyceride exceeds 400 mg/dL  84   HDL 46 53   TRIG 305* 535*   ALT 53 39   CR 1.19 1.16   GFRESTIMATED 63 66   GFRESTBLACK 74 76   POTASSIUM 4.7 4.5      BP Readings from Last 3 Encounters:   08/19/20 124/62   07/17/20 122/76   02/19/20 132/82    Wt Readings from Last 3 Encounters:   08/19/20 103.6 kg (228 lb 4.8 oz)   07/17/20 102.3 kg (225 lb 8 oz)   02/12/20 101.4 kg (223 lb 9.6 oz)                    Reviewed and updated as needed this visit by Provider         Review of Systems   Constitutional, HEENT, cardiovascular, pulmonary, gi and gu systems are negative, except as otherwise noted.      Objective    /62   Pulse 80   Temp 98.3  F (36.8  C) (Temporal)   Resp 16   Ht 1.755 m (5' 9.09\")   Wt 103.6 kg (228 lb 4.8 oz)   SpO2 99%   BMI 33.62 kg/m    Body mass index is 33.62 kg/m .  Physical Exam   GENERAL: healthy, alert and no distress  EYES: Eyes grossly normal to inspection, PERRL and conjunctivae and sclerae normal  HENT: ear canals and TM's normal, nose and mouth without ulcers or lesions  NECK: no adenopathy, no asymmetry, masses, or scars and thyroid normal to palpation  RESP: lungs clear to auscultation - no rales, rhonchi or wheezes  CV: regular rate and rhythm, normal S1 S2, no S3 or S4, no murmur, click or rub, no peripheral edema and peripheral pulses strong  ABDOMEN: soft, nontender, no hepatosplenomegaly, no masses and bowel sounds normal  RECTAL (male): normal sphincter tone, no rectal masses, prostate normal size, smooth, nontender without nodules or masses  MS: no gross musculoskeletal defects noted, no edema  SKIN: no suspicious lesions or rashes  NEURO: Normal strength and tone, mentation intact and speech " normal  BACK: no CVA tenderness, no paralumbar tenderness  PSYCH: mentation appears normal, affect normal/bright    Diagnostic Test Results:  Labs reviewed in Epic  EKG -sinus rhythm        ASSESSMENT and PLAN  1. Unstable angina (H)  64-year-old male with known CAD.  He has noticed chest discomfort that occurs when walking from car to work.  This represents a change.  He has noticed some improvement with nitroglycerin, which he only takes in his words to be able to fall asleep.  Recommend that he take nitroglycerin at any chest pain given his history.  Advised to go to the emergency room immediately if any recurrence chest pain.  At this time given his increasing symptoms, believe unstable angina, I have placed a referral to our cardiologist.  - EKG 12-lead complete w/read - Clinics  - CARDIOLOGY EVAL ADULT REFERRAL; Future  - CBC with platelets and differential  - Comprehensive metabolic panel (BMP + Alb, Alk Phos, ALT, AST, Total. Bili, TP)  - TSH with free T4 reflex    2. History of ST elevation myocardial infarction  History of ST elevation MI, he will likely require stress testing, follow-up with cardiology.    3. Blood in stool  Single episode of blood in stool, will work on cardiac issues for now and work-up blood in stool once cardiac issues stable.    4. Essential hypertension  Blood pressure currently at goal.    5. H/O diverticulitis of colon  History of diverticulitis, is possible this could have contributed to his blood in stool, however he had no abdominal pain at that time.    Return in about 4 weeks (around 9/16/2020) for Annual Well Check.     Vinay Garcia MD, FAAFP  Family Medicine Physician  [facility] [place]  [id number] [place] [id number]

## 2020-08-19 ENCOUNTER — OFFICE VISIT (OUTPATIENT)
Dept: FAMILY MEDICINE | Facility: CLINIC | Age: 65
End: 2020-08-19
Payer: COMMERCIAL

## 2020-08-19 VITALS
BODY MASS INDEX: 33.82 KG/M2 | TEMPERATURE: 98.3 F | HEART RATE: 80 BPM | DIASTOLIC BLOOD PRESSURE: 62 MMHG | SYSTOLIC BLOOD PRESSURE: 124 MMHG | RESPIRATION RATE: 16 BRPM | HEIGHT: 69 IN | WEIGHT: 228.3 LBS | OXYGEN SATURATION: 99 %

## 2020-08-19 DIAGNOSIS — I20.0 UNSTABLE ANGINA (H): Primary | ICD-10-CM

## 2020-08-19 DIAGNOSIS — K92.1 BLOOD IN STOOL: ICD-10-CM

## 2020-08-19 DIAGNOSIS — I25.2 HISTORY OF ST ELEVATION MYOCARDIAL INFARCTION: ICD-10-CM

## 2020-08-19 DIAGNOSIS — Z87.19 H/O DIVERTICULITIS OF COLON: ICD-10-CM

## 2020-08-19 DIAGNOSIS — I10 ESSENTIAL HYPERTENSION: ICD-10-CM

## 2020-08-19 LAB
ALBUMIN SERPL-MCNC: 3.6 G/DL (ref 3.4–5)
ALP SERPL-CCNC: 109 U/L (ref 40–150)
ALT SERPL W P-5'-P-CCNC: 49 U/L (ref 0–70)
ANION GAP SERPL CALCULATED.3IONS-SCNC: 4 MMOL/L (ref 3–14)
AST SERPL W P-5'-P-CCNC: 36 U/L (ref 0–45)
BASOPHILS # BLD AUTO: 0 10E9/L (ref 0–0.2)
BASOPHILS NFR BLD AUTO: 0.3 %
BILIRUB SERPL-MCNC: 0.3 MG/DL (ref 0.2–1.3)
BUN SERPL-MCNC: 13 MG/DL (ref 7–30)
CALCIUM SERPL-MCNC: 8.9 MG/DL (ref 8.5–10.1)
CHLORIDE SERPL-SCNC: 104 MMOL/L (ref 94–109)
CO2 SERPL-SCNC: 29 MMOL/L (ref 20–32)
CREAT SERPL-MCNC: 1.1 MG/DL (ref 0.66–1.25)
DIFFERENTIAL METHOD BLD: ABNORMAL
EOSINOPHIL # BLD AUTO: 0.3 10E9/L (ref 0–0.7)
EOSINOPHIL NFR BLD AUTO: 4.1 %
ERYTHROCYTE [DISTWIDTH] IN BLOOD BY AUTOMATED COUNT: 13.7 % (ref 10–15)
GFR SERPL CREATININE-BSD FRML MDRD: 70 ML/MIN/{1.73_M2}
GLUCOSE SERPL-MCNC: 101 MG/DL (ref 70–99)
HCT VFR BLD AUTO: 28 % (ref 40–53)
HGB BLD-MCNC: 8.8 G/DL (ref 13.3–17.7)
LYMPHOCYTES # BLD AUTO: 1.4 10E9/L (ref 0.8–5.3)
LYMPHOCYTES NFR BLD AUTO: 19.3 %
MCH RBC QN AUTO: 31.7 PG (ref 26.5–33)
MCHC RBC AUTO-ENTMCNC: 31.4 G/DL (ref 31.5–36.5)
MCV RBC AUTO: 101 FL (ref 78–100)
MONOCYTES # BLD AUTO: 0.9 10E9/L (ref 0–1.3)
MONOCYTES NFR BLD AUTO: 11.8 %
NEUTROPHILS # BLD AUTO: 4.7 10E9/L (ref 1.6–8.3)
NEUTROPHILS NFR BLD AUTO: 64.5 %
PLATELET # BLD AUTO: 289 10E9/L (ref 150–450)
POTASSIUM SERPL-SCNC: 4.6 MMOL/L (ref 3.4–5.3)
PROT SERPL-MCNC: 7.4 G/DL (ref 6.8–8.8)
RBC # BLD AUTO: 2.78 10E12/L (ref 4.4–5.9)
SODIUM SERPL-SCNC: 137 MMOL/L (ref 133–144)
TSH SERPL DL<=0.005 MIU/L-ACNC: 1.98 MU/L (ref 0.4–4)
WBC # BLD AUTO: 7.4 10E9/L (ref 4–11)

## 2020-08-19 PROCEDURE — 99214 OFFICE O/P EST MOD 30 MIN: CPT | Performed by: FAMILY MEDICINE

## 2020-08-19 PROCEDURE — 93000 ELECTROCARDIOGRAM COMPLETE: CPT | Performed by: FAMILY MEDICINE

## 2020-08-19 PROCEDURE — 80050 GENERAL HEALTH PANEL: CPT | Performed by: FAMILY MEDICINE

## 2020-08-19 PROCEDURE — 36415 COLL VENOUS BLD VENIPUNCTURE: CPT | Performed by: FAMILY MEDICINE

## 2020-08-19 ASSESSMENT — MIFFLIN-ST. JEOR: SCORE: 1812.43

## 2020-08-19 NOTE — PATIENT INSTRUCTIONS
Patient Education     Unstable Angina     Unstable angina is usually caused by coronary artery disease (CAD).   Angina is a feeling of pain, tightness, pressure, or discomfort in and around your chest. It can occur if your heart muscle isn t getting enough oxygen-rich blood. There are 2 kinds of angina. They are stable and unstable. Stable angina occurs at times you can predict. This might be during or after exercise or when exerting yourself. This type of angina can often be managed with medicine or rest.  Unstable angina does not occur at predictable times. And it may not respond to the usual forms of treatment. It is a warning that a heart attack (acute myocardial infarction) is possible in the near future. For this reason, it should be treated right away. This sheet tells you more about unstable angina. If you have questions, be sure to ask your healthcare provider.  Coronary disease causes angina   Your heart is a muscle. It gets oxygen from the blood sent through the coronary arteries. Coronary artery disease (CAD) occurs when fatty material (plaque) builds up within your artery walls. Plaque irritates (inflames) the artery wall. The buildup of plaque can reduce blood flow to your heart. This reduces the amount of oxygen your heart gets. The lower amount of oxygen can cause the chest pain of angina. Other symptoms of angina include lightheadedness, shortness of breath, nausea, abdominal pain, or unexplained sweating.  The difference between stable and unstable angina    Stable angina. This type of angina occurs most often during exercise or times of stress. Stable angina goes away when you rest or take nitroglycerin. This is a medicine that allows the heart muscle to relax. It helps increase blood flow through your arteries.    Unstable angina. This type of angina is caused when a piece of plaque breaks off (ruptures). A blood clot can form at the site of the rupture. The clot reduces blood flow even more.  "Unstable angina is described as chest pain that occurs unpredictably even at rest. You may also be diagnosed with unstable angina if you have stable angina that becomes more severe, lasts longer, or that is not relieved by rest or medicine.  How unstable angina feels  Unstable and stable angina have the same symptoms. With unstable angina, the symptoms are more severe and last longer. Symptoms include:    Discomfort, aching, heaviness, tightness, squeezing, or pressure. You may feel this in your chest or back. You may also feel it in your arm, shoulder, neck, jaw, or upper abdomen.    Feeling more tired than usual for no clear reason    Nausea    Unexplained sweating    Shortness of breath  Not everyone who has a heart attack has the typical symptom of chest pain. You may be having a \"silent\" (unrecognized) heart attack if you lose consciousness (syncope), or have confusion, weakness, or changes in thinking (delirium). Get medical help right away to find out if you are having a heart attack or another serious condition.  Your evaluation  Because unstable angina can lead to a heart attack, it is viewed as an emergency. If you are having symptoms of unstable angina, you should call 911 right away. Your healthcare provider will ask about your symptoms and examine you. Tests will be done quickly. Common tests include:    Blood tests. These can help tell if there is damage to your heart. They may repeated often, usually every 6 to 8 hours until you have 3 sets. They can also check for cholesterol in the blood, which leads to plaque buildup. They can check for other health problems that affect the heart, such as diabetes.    Electrocardiogram (ECG). This test records your heart s electrical patterns. A resting ECG can show if you have damage to your heart muscle or a change in the way your heart beats. A stress ECG (stress test) can show how well the blood flows to your heart during exercise.    Angiography. This test can " show where your arteries are narrowed. The doctor puts a long tube (catheter) in an artery in your arm, neck, or leg (groin). He or she slowly guides it to your heart. Dye is sent through the tube and into the coronary arteries. This makes the arteries show up clearly on X-rays. This helps show any blockages or areas where plaques have narrowed the artery.  Treating unstable angina  Your treatment will depend on the results of your tests. Possible treatments include:    Observation. If your symptoms are severe, you may need to stay in the hospital to be watched. If your chest pain gets better and tests show no sign of damage to your heart, you'll likely be able to go home.     Medicines. Your healthcare provider will likely give you nitroglycerin. He or she also may give you medicines that help prevent blood clots, such as aspirin. He or she may also give you medicines to help reduce your blood pressure or slow your heart rate.    Procedures to improve blood flow. If your chest pain does not get better, your healthcare provider may suggest procedures to improve blood flow to your heart muscle. These can include angioplasty and bypass surgery. Your provider will tell you more about these treatments if you need them.    Lifestyle changes. Lifestyle changes usually include not smoking, eating healthy foods, and getting regular exercise. These changes will take time to reduce your risk of having a heart attack. For them to work, you will need to do them for the long term. The sooner you start, the better it will be for your overall health. If you have other health problems such as high blood pressure, diabetes, or high cholesterol, these need to be treated as well. They can increase your risk for a heart attack. Lifestyle changes can make unstable angina attacks less frequent and less severe. They also help you manage CAD and reduce your risk for a heart attack.  When taking nitroglycerin  If your healthcare provider  prescribes nitroglycerin, be sure to follow his or her instructions on how to use it. Also be sure to tell your provider if you re taking any other medicines. This includes other prescription and over-the-counter medicines, as well as herbs and other supplements. Don't take nitroglycerin if you take medicines to treat erectile dysfunction or pulmonary hypertension. These can include sildenafil, tadalafil, or vardenafil. The combination of these medicines can cause a dangerous drop in blood pressure.     When to call 911  Call 911 or go to the emergency room (ER) right away if your chest pain:    Occurs when you re not active.    Wakes you up from sleep.    Comes back and is not relieved by your usual dose of nitroglycerin.    Occurs with weakness, dizziness, fainting, heavy sweating, nausea, or vomiting.    Lasts longer than 5 minutes.    Feels like it's getting worse   Date Last Reviewed: 10/1/2016    5064-9910 [employer]. [address] [place] [address]. All rights reserved. This information is not intended as a substitute for professional medical care. Always follow your healthcare professional's instructions.         Pedrito,    It was great seeing you in clinic today.  I summarized our discussion and your plan below.  Please let me know if you have any questions or concerns.  Please follow up with me as discussed in clinic or sooner if any worsening or additional concerns.     1. Unstable angina (H)  64-year-old male with known CAD.  He has noticed chest discomfort that occurs when walking from car to work.  This represents a change.  He has noticed some improvement with nitroglycerin, which he only takes in his words to be able to fall asleep.  Recommend that he take nitroglycerin at any chest pain given his history.  Advised to go to the emergency room immediately if any recurrence chest pain.  At this time given his increasing symptoms, believe unstable angina, I have placed a referral  to our cardiologist.  - EKG 12-lead complete w/read - Clinics  - CARDIOLOGY EVAL ADULT REFERRAL; Future  - CBC with platelets and differential  - Comprehensive metabolic panel (BMP + Alb, Alk Phos, ALT, AST, Total. Bili, TP)  - TSH with free T4 reflex    2. History of ST elevation myocardial infarction  History of ST elevation MI, he will likely require stress testing, follow-up with cardiology.    3. Blood in stool  Single episode of blood in stool, will work on cardiac issues for now and work-up blood in stool once cardiac issues stable.    4. Essential hypertension  Blood pressure currently at goal.    5. H/O diverticulitis of colon  History of diverticulitis, is possible this could have contributed to his blood in stool, however he had no abdominal pain at that time.       Sincerely,  Dr. SAIDA Garcia MD, FAAFP  Family Medicine Physician  [facility] [place]  [address] [place] [address]    Patient Education

## 2020-08-20 ENCOUNTER — TELEPHONE (OUTPATIENT)
Dept: FAMILY MEDICINE | Facility: CLINIC | Age: 65
End: 2020-08-20

## 2020-08-20 DIAGNOSIS — D64.9 ANEMIA, UNSPECIFIED TYPE: Primary | ICD-10-CM

## 2020-08-20 NOTE — TELEPHONE ENCOUNTER
Vinay Garcia MD Alderman, Shawn, MD               Please advise patient that his blood count showed significant anemia, decrease in red blood cells.  I have ordered additional labs, please schedule that for today.  I have also ordered a colonoscopy given his history of rectal bleeding.  Please schedule that appointment as soon as possible.  Please ensure he has cardiology appointment within the next week as well.     Vinay Garcia MD, FAAFP   Family Medicine Physician   [facility] [place]   [address] [place] [address]

## 2020-08-20 NOTE — RESULT ENCOUNTER NOTE
Please advise patient that his blood count showed significant anemia, decrease in red blood cells.  I have ordered additional labs, please schedule that for today.  I have also ordered a colonoscopy given his history of rectal bleeding.  Please schedule that appointment as soon as possible.  Please ensure he has cardiology appointment within the next week as well.    Vinay Garcia MD, FAAFP  Family Medicine Physician  [facility] [place]  [address] [facility], [place] [address]

## 2020-08-20 NOTE — TELEPHONE ENCOUNTER
Left message asking patient to return call.  Please ensure that the information from both this message from provider and the one below is given to pt.    Please advise patient that his blood count showed significant anemia, decrease in red blood cells.  I have ordered additional labs, please schedule that for today.  I have also ordered a colonoscopy given his history of rectal bleeding.  Please schedule that appointment as soon as possible.  Please ensure he has cardiology appointment within the next week as well.   Vinay Garcia MD, FAAFP    Pt can call to schedule these appts - colonoscopy  [phone] & cardiology [facility] [place] [phone]

## 2020-08-21 DIAGNOSIS — Z11.59 ENCOUNTER FOR SCREENING FOR OTHER VIRAL DISEASES: Primary | ICD-10-CM

## 2020-08-24 DIAGNOSIS — D64.9 ANEMIA, UNSPECIFIED TYPE: ICD-10-CM

## 2020-08-24 LAB
ERYTHROCYTE [DISTWIDTH] IN BLOOD BY AUTOMATED COUNT: 13 % (ref 10–15)
HCT VFR BLD AUTO: 29.5 % (ref 40–53)
HGB BLD-MCNC: 9.1 G/DL (ref 13.3–17.7)
IRON SATN MFR SERPL: 7 % (ref 15–46)
IRON SERPL-MCNC: 34 UG/DL (ref 35–180)
MCH RBC QN AUTO: 30.7 PG (ref 26.5–33)
MCHC RBC AUTO-ENTMCNC: 30.8 G/DL (ref 31.5–36.5)
MCV RBC AUTO: 100 FL (ref 78–100)
PLATELET # BLD AUTO: 288 10E9/L (ref 150–450)
RBC # BLD AUTO: 2.96 10E12/L (ref 4.4–5.9)
RETICS # AUTO: 155.3 10E9/L (ref 25–95)
RETICS/RBC NFR AUTO: 5.2 % (ref 0.5–2)
TIBC SERPL-MCNC: 463 UG/DL (ref 240–430)
WBC # BLD AUTO: 6.8 10E9/L (ref 4–11)

## 2020-08-24 PROCEDURE — 85027 COMPLETE CBC AUTOMATED: CPT | Performed by: FAMILY MEDICINE

## 2020-08-24 PROCEDURE — 83540 ASSAY OF IRON: CPT | Performed by: FAMILY MEDICINE

## 2020-08-24 PROCEDURE — 83550 IRON BINDING TEST: CPT | Performed by: FAMILY MEDICINE

## 2020-08-24 PROCEDURE — 36415 COLL VENOUS BLD VENIPUNCTURE: CPT | Performed by: FAMILY MEDICINE

## 2020-08-24 PROCEDURE — 85045 AUTOMATED RETICULOCYTE COUNT: CPT | Performed by: FAMILY MEDICINE

## 2020-08-26 DIAGNOSIS — D64.9 ANEMIA, UNSPECIFIED TYPE: ICD-10-CM

## 2020-09-02 LAB — HEMOCCULT STL QL IA: NEGATIVE

## 2020-09-02 PROCEDURE — 82274 ASSAY TEST FOR BLOOD FECAL: CPT | Performed by: FAMILY MEDICINE

## 2020-09-03 RX ORDER — BISACODYL 5 MG/1
15 TABLET, DELAYED RELEASE ORAL SEE ADMIN INSTRUCTIONS
Qty: 3 TABLET | Refills: 0 | Status: SHIPPED | OUTPATIENT
Start: 2020-09-03 | End: 2021-05-14

## 2020-09-03 NOTE — RESULT ENCOUNTER NOTE
Pedrito,  Your recent fit test was negative.  Please let me know if you have any questions or concerns and follow up as discussed in clinic.    Sincerely.  Dr. SAIDA Garcia MD, FAAFP  Family Medicine Physician  [facility] [place]  [address] [place] [address]

## 2020-09-04 ENCOUNTER — VIRTUAL VISIT (OUTPATIENT)
Dept: CARDIOLOGY | Facility: CLINIC | Age: 65
End: 2020-09-04
Payer: COMMERCIAL

## 2020-09-04 DIAGNOSIS — E78.5 HYPERLIPIDEMIA LDL GOAL <100: ICD-10-CM

## 2020-09-04 DIAGNOSIS — I20.0 UNSTABLE ANGINA (H): ICD-10-CM

## 2020-09-04 DIAGNOSIS — I25.2 H/O ACUTE MYOCARDIAL INFARCTION: ICD-10-CM

## 2020-09-04 DIAGNOSIS — I10 ESSENTIAL HYPERTENSION: ICD-10-CM

## 2020-09-04 PROCEDURE — 99204 OFFICE O/P NEW MOD 45 MIN: CPT | Mod: 95 | Performed by: INTERNAL MEDICINE

## 2020-09-04 RX ORDER — ISOSORBIDE MONONITRATE 30 MG/1
30 TABLET, EXTENDED RELEASE ORAL DAILY
Qty: 30 TABLET | Refills: 3 | Status: SHIPPED | OUTPATIENT
Start: 2020-09-04 | End: 2021-01-04

## 2020-09-04 RX ORDER — LISINOPRIL 10 MG/1
10 TABLET ORAL DAILY
Qty: 90 TABLET | Refills: 3
Start: 2020-09-04 | End: 2020-09-29

## 2020-09-04 RX ORDER — ATORVASTATIN CALCIUM 80 MG/1
80 TABLET, FILM COATED ORAL DAILY
Qty: 90 TABLET | Refills: 3 | Status: SHIPPED | OUTPATIENT
Start: 2020-09-04 | End: 2021-10-21

## 2020-09-04 RX ORDER — EZETIMIBE 10 MG/1
10 TABLET ORAL DAILY
Qty: 90 TABLET | Refills: 3 | Status: SHIPPED | OUTPATIENT
Start: 2020-09-04 | End: 2021-09-14

## 2020-09-04 RX ORDER — METOPROLOL SUCCINATE 25 MG/1
25 TABLET, EXTENDED RELEASE ORAL DAILY
Qty: 90 TABLET | Refills: 3 | Status: SHIPPED | OUTPATIENT
Start: 2020-09-04 | End: 2021-09-14

## 2020-09-04 NOTE — PROGRESS NOTES
"Pedrito Negron is a 64 year old male who is being evaluated via a billable video visit.      The patient has been notified of following:     \"This video visit will be conducted via a call between you and your physician/provider. We have found that certain health care needs can be provided without the need for an in-person physical exam.  This service lets us provide the care you need with a video conversation.  If a prescription is necessary we can send it directly to your pharmacy.  If lab work is needed we can place an order for that and you can then stop by our lab to have the test done at a later time.    Video visits are billed at different rates depending on your insurance coverage.  Please reach out to your insurance provider with any questions.    If during the course of the call the physician/provider feels a video visit is not appropriate, you will not be charged for this service.\"    Patient has given verbal consent for Video visit? YES  How would you like to obtain your AVS? mychart  If you are dropped from the video visit, the video invite should be resent to: [phone]  Will anyone else be joining your video visit? no    Video-Visit Details    Type of service:  Video Visit    Video Start Time: 2:04 PM  Video End Time: 2:44 PM    Originating Location (pt. Location): Home    Distant Location (provider location):  [facility]     Platform used for Video Visit: Sathish Shields MD    [facility] Cardiology Consultation:         Assessment and Plan:     1- New onset stable angina, CCS class 2 in the setting of new-onset iron deficiency anemia (Hb 14 in 2/2020 compared to 9.1 in 8/2020)  2- History of inferior STEMI on 12/17/2018 s/p DIMITRI to left circumflex/OM and DIMITRI*2 to the proximal and distal RCA,  PCI to LAD in 2010 and another PCI in 2003 (last  on 7/10/2020)  - Patient has been having stable anginal symptoms for the past couple of weeks, however " non at rest and not worsening.   - Will order a stress MRI to assess ischemia burden. Dr. Shields will discuss with GI timing of stress MRI since he is scheduled for colonoscopy next week  - Continue DAPT. Can hold clopidogrel prior to colonoscopy, but aspirin needs to be continued  - Restart metoprolol succinate 25 daily (patient out of refills).   - Add Imdur 30 mg daily  - Increase lipitor back to 80 daily (unclear why decreased to 40 daily) and start Ezetimibe 10 once daily. Repeat lipid panel in 3 months    3- Ischemic cardiomyopathy (OSH visual EF around 50% as by prior notes, no access to images).  - Continue lisinopril (decrease to 10 daily given Imdur is added)  - Restart metoprolol as above  - No need for diuretics (no heart failure symptoms)  - Stress MRI will assess function    4- Hypertension - well controlled, as by patient  Continue lisinopril and restart metoprolol    5- Hyperlipidemia --  goal LDL<70  Increase lipitor back to 80 daily and start Ezetimibe 10 once daily.    6- LANDON (last Hb 9.1, was 14 in 2/2020)  Colonoscopy planned next week.  On iron pills as by patient.    7- Alcohol use (3 drinks a day for the last 2 years)    Might be contributing to his anemia      RTC in a month    Case  discussed with Dr. Shields    [facility] Cardiovascular Division    I have seen and examined the patient, reviewed labs and tests. I have discussed my findings and treatment recommendations with the house staff and/or Cardiology fellow and agree with their assessment and plan as outlined in the note.    Dio Shields MD    Cardiac Imaging and Prevention  [facility]  Pager: [phone]        Reason for consultation: chest pain      HPI: Pedrito Negron is a 64 year old year old male who is here to establish care.     His medical history is relevant for inferior STEMI on 12/17/2018 s/p DIMITRI to left circumflex/OM and DIMITRI*2 to the proximal and distal RCA,  PCI to  LAD in [place] in 2010 and another PCI in [place] in 2003 (last  on 7/10/2020), hypertension, hyperlipidemia, LANDON (last Hb 9.1), alcohol use (3 drinks a day) and diverticulitis s/p colectomy in 2001     A couple of weeks ago, the patient started noticing rectal bleed and started having chest pressure after walking 100 steps, indigestion-like and relieved by nitro.  His chest pressure is not getting worse, does not occur at rest, retrosternal, does not irradiate and does not resemble his prior MI pain.  He had some dizziness when he had rectal bleed, which since resolved.  He is following with GI regarding his rectal bleed and is scheduled soon for a colonoscopy.    He denies having  shortness of breath, orthopnea, cough, edema, palpitations, PND, or syncope.   No significant weight gain or loss. Memory is not impaired.    EXAM:  GEN/CONSTITUIONAL: Appears comfortable, in no apparent distress   EYES: No icterus noted.   JVP:  Not visible  RESPIRATORY: No cough or labored breathing   NEUROLOGIC: No tremor noted  PSYCHIATRIC: Normal affect  EXT: No edema noted.  Skin: No rash visible  The rest of a comprehensive physical examination is deferred due to PHE (public health emergency) video visit restrictions.      PAST MEDICAL HISTORY:  As above.    CURRENT MEDICATIONS:  Current Outpatient Medications   Medication     alclomethasone (ACLOVATE) 0.05 % external cream     aspirin (ASA) 81 MG EC tablet     atorvastatin (LIPITOR) 40 MG tablet     bisacodyl (DULCOLAX) 5 MG EC tablet     clopidogrel (PLAVIX) 75 MG tablet     ketoconazole 2 % EX external shampoo     lisinopril (ZESTRIL) 20 MG tablet     Multiple Vitamin (MULTI-VITAMINS) TABS     nitroGLYcerin (NITROSTAT) 0.4 MG sublingual tablet     omeprazole (PRILOSEC) 20 MG DR capsule     polyethylene glycol (GOLYTELY) 236 g suspension     Simethicone 125 MG TABS     No current facility-administered medications for this visit.        PAST SURGICAL HISTORY:  Colectomy in      ALLERGIES   No Known Allergies    FAMILY HISTORY:  Sister has diabetes  Brother had a stroke    SOCIAL HISTORY:  Social History     Socioeconomic History     Marital status: Single     Spouse name: Not on file     Number of children: Not on file     Years of education: Not on file     Highest education level: Not on file   Occupational History     Not on file   Social Needs     Financial resource strain: Not on file     Food insecurity     Worry: Not on file     Inability: Not on file     Transportation needs     Medical: Not on file     Non-medical: Not on file   Tobacco Use     Smoking status: Former Smoker     Types: Cigarettes     Last attempt to quit: 2001     Years since quittin.6     Smokeless tobacco: Never Used   Substance and Sexual Activity     Alcohol use: Not Currently     Drug use: Never     Sexual activity: Not Currently   Lifestyle     Physical activity     Days per week: Not on file     Minutes per session: Not on file     Stress: Not on file   Relationships     Social connections     Talks on phone: Not on file     Gets together: Not on file     Attends Yazdanism service: Not on file     Active member of club or organization: Not on file     Attends meetings of clubs or organizations: Not on file     Relationship status: Not on file     Intimate partner violence     Fear of current or ex partner: Not on file     Emotionally abused: Not on file     Physically abused: Not on file     Forced sexual activity: Not on file   Other Topics Concern     Not on file   Social History Narrative     Not on file       ROS:   Constitutional: No fever, chills, or sweats. No weight gain/loss   ENT: No new visual disturbance, ear ache, epistaxis, sore throat  Allergies/Immunologic: Negative.   Respiratory: No cough, hemoptysia  Cardiovascular: As per HPI  GI: No nausea, vomiting, hematemesis, melena, or hematochezia  : No new symptoms.  Integument: Negative  Psychiatric: Negative  Neuro:  Negative  Endocrinology: Negative   Musculoskeletal: Negative      ADDITIONAL COMMENTS:       I reviewed the patient's medications    I reviewed the patient's pertinent clinical laboratory studies:       CBC RESULTS:   Recent Labs   Lab Test 08/24/20  1255   WBC 6.8   RBC 2.96*   HGB 9.1*   HCT 29.5*      MCH 30.7   MCHC 30.8*   RDW 13.0          Last Comprehensive Metabolic Panel:  Sodium   Date Value Ref Range Status   08/19/2020 137 133 - 144 mmol/L Final     Potassium   Date Value Ref Range Status   08/19/2020 4.6 3.4 - 5.3 mmol/L Final     Chloride   Date Value Ref Range Status   08/19/2020 104 94 - 109 mmol/L Final     Carbon Dioxide   Date Value Ref Range Status   08/19/2020 29 20 - 32 mmol/L Final     Anion Gap   Date Value Ref Range Status   08/19/2020 4 3 - 14 mmol/L Final     Glucose   Date Value Ref Range Status   08/19/2020 101 (H) 70 - 99 mg/dL Final     Urea Nitrogen   Date Value Ref Range Status   08/19/2020 13 7 - 30 mg/dL Final     Creatinine   Date Value Ref Range Status   08/19/2020 1.10 0.66 - 1.25 mg/dL Final     GFR Estimate   Date Value Ref Range Status   08/19/2020 70 >60 mL/min/[1.73_m2] Final     Comment:     Non  GFR Calc  Starting 12/18/2018, serum creatinine based estimated GFR (eGFR) will be   calculated using the Chronic Kidney Disease Epidemiology Collaboration   (CKD-EPI) equation.       Calcium   Date Value Ref Range Status   08/19/2020 8.9 8.5 - 10.1 mg/dL Final       Lab Results   Component Value Date    AST 36 08/19/2020     Lab Results   Component Value Date    ALT 49 08/19/2020     No results found for: BILICONJ   Lab Results   Component Value Date    BILITOTAL 0.3 08/19/2020     Lab Results   Component Value Date    ALBUMIN 3.6 08/19/2020     Lab Results   Component Value Date    PROTTOTAL 7.4 08/19/2020      Lab Results   Component Value Date    ALKPHOS 109 08/19/2020         No results found for: TROPI    No results found for: INR     TSH    Date Value Ref Range Status   08/19/2020 1.98 0.40 - 4.00 mU/L Final       Cholesterol   Date Value Ref Range Status   07/10/2020 258 (H) <200 mg/dL Final     Comment:     Desirable:       <200 mg/dl   02/19/2020 197 <200 mg/dL Final     HDL Cholesterol   Date Value Ref Range Status   07/10/2020 46 >39 mg/dL Final   02/19/2020 53 >39 mg/dL Final     LDL Cholesterol Calculated   Date Value Ref Range Status   07/10/2020 151 (H) <100 mg/dL Final     Comment:     Above desirable:  100-129 mg/dl  Borderline High:  130-159 mg/dL  High:             160-189 mg/dL  Very high:       >189 mg/dl     02/19/2020  <100 mg/dL Final    Cannot estimate LDL when triglyceride exceeds 400 mg/dL     LDL Cholesterol Direct   Date Value Ref Range Status   02/19/2020 84 <100 mg/dL Final     Comment:     Desirable:       <100 mg/dl     Triglycerides   Date Value Ref Range Status   07/10/2020 305 (H) <150 mg/dL Final     Comment:     Borderline high:  150-199 mg/dl  High:             200-499 mg/dl  Very high:       >499 mg/dl     02/19/2020 535 (H) <150 mg/dL Final     Comment:     Borderline high:  150-199 mg/dl  High:             200-499 mg/dl  Very high:       >499 mg/dl           I reviewed the patient's pertinent imaging studies:     Last TTE OSH 12/17/2018  Interface, Nmradordrslt In - 12/17/2018  4:30 PM CST  Interpretation Summary    * The left ventricular systolic function is normal, estimated LVEF 55-60%.    * Moderate to severe posterobasal, inferolateral basal and lateral  hypokinesis.    * There is moderate concentric left ventricular hypertrophy.    * There is no significant mitral regurgitation.    * Due to inadequate tricuspid regurgitant velocity, unable to calculate  right ventricular systolic pressure.    * No prior study.      Coronary angiogram and PCI 12/17/2018  Conclusions    1. CAD as described.    2. PCI was performed to the left circumflex/obtuse marginal branch using a  single drug-eluting stent. Excellent  result was obtained.    3. PCI was performed to the right coronary artery. One Drug-eluting stent  each was placed in the proximal RCA and in the distal RCA. Excellent result  was obtained.      Diagnostic Summary    * Left Main is a large caliber normal vessel.    * Left anterior descending artery is a large caliber vessel with one major  diagonal branch. There is a previously placed stent in the diagonal branch.  Only mild irregularities are noted in the LAD and the diagonal branch.    * Left circumflex is a large caliber vessel and has previously placed stents  in the entire proximal vessel and a large first obtuse marginal branch. In the  midportion of this vessel, there is 95% narrowing with haziness and possible  clot within the previously stented segment. This is likely the culprit lesion.    * There is a small caliber ramus intermedius vessel which has a previously  placed stent at the ostium. It is occluded at the ostium and the distal vessel  is filled via collaterals. It has an appearance of a chronic occlusion.    * Right coronary artery is a large caliber dominant vessel giving rise to a  medium caliber PDA and a medium caliber posterolateral branch. The proximal  RCA has severe 90% stenosis. Distal right coronary artery has diffuse 70%  narrowing just before the bifurcation.      Interventional Summary    * PCI was performed to the left circumflex/obtuse marginal branch using a  single drug-eluting stent. Excellent result was obtained.    * PCI was performed to the right coronary artery. One Drug-eluting stent  each was placed in the proximal RCA and in the distal RCA. Excellent result  was obtained.      I reviewed the patient's ECG:       ECG 9/6/2019  Sinus rhythm      Nghia Lacy MD  PGY-4 cardiology fellow  [facility]  [email or web address] Pager: [phone]

## 2020-09-04 NOTE — PATIENT INSTRUCTIONS
Increase Lipitor to 80 mg daily.  Start Zetia 10 mg daily.  Start metoprolol 25 mg daily.  Start Imdur 30 mg daily.  Decrease lisinopril to 10 mg daily.    Stress cardiac MRI, schedule at [facility] next available. Take a  with you in case you need sedation.    For your upcoming colonoscopy, okay to hold Plavix as instructed.  Do not stop aspirin.      Follow-up in clinic in 4 weeks       normal (ped)...

## 2020-09-09 DIAGNOSIS — I20.0 UNSTABLE ANGINA (H): Primary | ICD-10-CM

## 2020-09-09 NOTE — TELEPHONE ENCOUNTER
Pending Prescriptions:                       Disp   Refills    nitroGLYcerin (NITROSTAT) 0.4 MG sublingua*25 tab*         Sig: ONE TABLET UNDER TONGUE AS NEEDED FOR CHEST PAIN- IF           NO RELIEF AFTER 5 MINUTES CALL 911; USE 1 TABLET           EVERY 5 MINUTES- MAX OF 3 TABLETS        Routing refill request to provider for review/approval because:  Labs out of range:  Providers review  Nelson Feldman RN  September 9, 2020

## 2020-09-10 RX ORDER — NITROGLYCERIN 0.4 MG/1
TABLET SUBLINGUAL
Qty: 25 TABLET | Refills: 1 | Status: SHIPPED | OUTPATIENT
Start: 2020-09-10 | End: 2021-01-25

## 2020-09-11 ENCOUNTER — MYC MEDICAL ADVICE (OUTPATIENT)
Dept: PEDIATRICS | Facility: CLINIC | Age: 65
End: 2020-09-11

## 2020-09-11 DIAGNOSIS — Z11.59 ENCOUNTER FOR SCREENING FOR OTHER VIRAL DISEASES: ICD-10-CM

## 2020-09-11 PROCEDURE — U0003 INFECTIOUS AGENT DETECTION BY NUCLEIC ACID (DNA OR RNA); SEVERE ACUTE RESPIRATORY SYNDROME CORONAVIRUS 2 (SARS-COV-2) (CORONAVIRUS DISEASE [COVID-19]), AMPLIFIED PROBE TECHNIQUE, MAKING USE OF HIGH THROUGHPUT TECHNOLOGIES AS DESCRIBED BY CMS-2020-01-R: HCPCS | Performed by: SURGERY

## 2020-09-12 LAB
LABORATORY COMMENT REPORT: NORMAL
SARS-COV-2 RNA SPEC QL NAA+PROBE: NEGATIVE
SARS-COV-2 RNA SPEC QL NAA+PROBE: NORMAL
SPECIMEN SOURCE: NORMAL
SPECIMEN SOURCE: NORMAL

## 2020-09-14 ENCOUNTER — SURGERY (OUTPATIENT)
Age: 65
End: 2020-09-14
Payer: COMMERCIAL

## 2020-09-14 ENCOUNTER — HOSPITAL ENCOUNTER (OUTPATIENT)
Facility: AMBULATORY SURGERY CENTER | Age: 65
Discharge: HOME | End: 2020-09-14
Attending: SURGERY | Admitting: SURGERY
Payer: COMMERCIAL

## 2020-09-14 VITALS
SYSTOLIC BLOOD PRESSURE: 137 MMHG | RESPIRATION RATE: 16 BRPM | DIASTOLIC BLOOD PRESSURE: 83 MMHG | TEMPERATURE: 97.3 F | HEART RATE: 79 BPM | OXYGEN SATURATION: 96 %

## 2020-09-14 DIAGNOSIS — Z12.11 COLON CANCER SCREENING: Primary | ICD-10-CM

## 2020-09-14 LAB — COLONOSCOPY: NORMAL

## 2020-09-14 PROCEDURE — G8918 PT W/O PREOP ORDER IV AB PRO: HCPCS

## 2020-09-14 PROCEDURE — G8907 PT DOC NO EVENTS ON DISCHARG: HCPCS

## 2020-09-14 PROCEDURE — 88305 TISSUE EXAM BY PATHOLOGIST: CPT | Performed by: SURGERY

## 2020-09-14 PROCEDURE — 45385 COLONOSCOPY W/LESION REMOVAL: CPT | Performed by: SURGERY

## 2020-09-14 PROCEDURE — 45385 COLONOSCOPY W/LESION REMOVAL: CPT

## 2020-09-14 RX ORDER — FLUMAZENIL 0.1 MG/ML
0.2 INJECTION, SOLUTION INTRAVENOUS
Status: SHIPPED | OUTPATIENT
Start: 2020-09-14 | End: 2020-09-14

## 2020-09-14 RX ORDER — ONDANSETRON 2 MG/ML
4 INJECTION INTRAMUSCULAR; INTRAVENOUS
Status: DISCONTINUED | OUTPATIENT
Start: 2020-09-14 | End: 2020-09-15 | Stop reason: HOSPADM

## 2020-09-14 RX ORDER — ONDANSETRON 2 MG/ML
4 INJECTION INTRAMUSCULAR; INTRAVENOUS EVERY 6 HOURS PRN
Status: DISCONTINUED | OUTPATIENT
Start: 2020-09-14 | End: 2020-09-15 | Stop reason: HOSPADM

## 2020-09-14 RX ORDER — LIDOCAINE 40 MG/G
CREAM TOPICAL
Status: DISCONTINUED | OUTPATIENT
Start: 2020-09-14 | End: 2020-09-15 | Stop reason: HOSPADM

## 2020-09-14 RX ORDER — NALOXONE HYDROCHLORIDE 0.4 MG/ML
.1-.4 INJECTION, SOLUTION INTRAMUSCULAR; INTRAVENOUS; SUBCUTANEOUS
Status: DISCONTINUED | OUTPATIENT
Start: 2020-09-14 | End: 2020-09-15 | Stop reason: HOSPADM

## 2020-09-14 RX ORDER — FENTANYL CITRATE 50 UG/ML
INJECTION, SOLUTION INTRAMUSCULAR; INTRAVENOUS PRN
Status: DISCONTINUED | OUTPATIENT
Start: 2020-09-14 | End: 2020-09-14 | Stop reason: HOSPADM

## 2020-09-14 RX ORDER — ONDANSETRON 4 MG/1
4 TABLET, ORALLY DISINTEGRATING ORAL EVERY 6 HOURS PRN
Status: DISCONTINUED | OUTPATIENT
Start: 2020-09-14 | End: 2020-09-15 | Stop reason: HOSPADM

## 2020-09-14 RX ADMIN — FENTANYL CITRATE 50 MCG: 50 INJECTION, SOLUTION INTRAMUSCULAR; INTRAVENOUS at 10:31

## 2020-09-14 RX ADMIN — FENTANYL CITRATE 25 MCG: 50 INJECTION, SOLUTION INTRAMUSCULAR; INTRAVENOUS at 10:33

## 2020-09-16 ENCOUNTER — TELEPHONE (OUTPATIENT)
Dept: FAMILY MEDICINE | Facility: CLINIC | Age: 65
End: 2020-09-16

## 2020-09-16 DIAGNOSIS — F40.240 CLAUSTROPHOBIA: Primary | ICD-10-CM

## 2020-09-16 LAB — COPATH REPORT: NORMAL

## 2020-09-16 NOTE — TELEPHONE ENCOUNTER
Reason for Call:  Medication or medication refill: Medication   Do you use a [facility]?  Name of the pharmacy and phone number for the current request:  [facility] - [phone]    Name of the medication requested: n/a   Other request: Patient calling having a mr done this Friday and he is claustrophobic. Patient is wondering if a sedative can be prescribed for him.       Can we leave a detailed message on this number? YES    Phone number patient can be reached at: Cell number on file:    Telephone Information:   Mobile [phone]       Best Time: Anytime     Call taken on 9/16/2020 at 3:33 PM by Santa Xiong

## 2020-09-17 RX ORDER — DIAZEPAM 5 MG
5 TABLET ORAL ONCE
Qty: 1 TABLET | Refills: 0 | Status: SHIPPED | OUTPATIENT
Start: 2020-09-17 | End: 2020-09-17

## 2020-09-18 ENCOUNTER — HOSPITAL ENCOUNTER (OUTPATIENT)
Dept: MRI IMAGING | Facility: CLINIC | Age: 65
Discharge: HOME | End: 2020-09-18
Attending: INTERNAL MEDICINE | Admitting: INTERNAL MEDICINE
Payer: COMMERCIAL

## 2020-09-18 VITALS
HEART RATE: 70 BPM | OXYGEN SATURATION: 97 % | DIASTOLIC BLOOD PRESSURE: 95 MMHG | SYSTOLIC BLOOD PRESSURE: 150 MMHG | RESPIRATION RATE: 16 BRPM

## 2020-09-18 DIAGNOSIS — I25.2 H/O ACUTE MYOCARDIAL INFARCTION: ICD-10-CM

## 2020-09-18 DIAGNOSIS — I20.0 UNSTABLE ANGINA (H): ICD-10-CM

## 2020-09-18 PROCEDURE — 25000128 H RX IP 250 OP 636: Performed by: INTERNAL MEDICINE

## 2020-09-18 PROCEDURE — 25500064 ZZH RX 255 OP 636: Performed by: INTERNAL MEDICINE

## 2020-09-18 PROCEDURE — 75563 CARD MRI W/STRESS IMG & DYE: CPT

## 2020-09-18 PROCEDURE — 93017 CV STRESS TEST TRACING ONLY: CPT

## 2020-09-18 PROCEDURE — 93010 ELECTROCARDIOGRAM REPORT: CPT | Mod: 76 | Performed by: INTERNAL MEDICINE

## 2020-09-18 PROCEDURE — 93018 CV STRESS TEST I&R ONLY: CPT | Performed by: INTERNAL MEDICINE

## 2020-09-18 PROCEDURE — 93016 CV STRESS TEST SUPVJ ONLY: CPT | Performed by: INTERNAL MEDICINE

## 2020-09-18 PROCEDURE — A9585 GADOBUTROL INJECTION: HCPCS | Performed by: INTERNAL MEDICINE

## 2020-09-18 PROCEDURE — 75563 CARD MRI W/STRESS IMG & DYE: CPT | Mod: 26 | Performed by: INTERNAL MEDICINE

## 2020-09-18 PROCEDURE — 93005 ELECTROCARDIOGRAM TRACING: CPT

## 2020-09-18 RX ORDER — GADOBUTROL 604.72 MG/ML
10 INJECTION INTRAVENOUS ONCE
Status: COMPLETED | OUTPATIENT
Start: 2020-09-18 | End: 2020-09-18

## 2020-09-18 RX ORDER — AMINOPHYLLINE 25 MG/ML
100 INJECTION, SOLUTION INTRAVENOUS ONCE
Status: COMPLETED | OUTPATIENT
Start: 2020-09-18 | End: 2020-09-18

## 2020-09-18 RX ORDER — REGADENOSON 0.08 MG/ML
0.4 INJECTION, SOLUTION INTRAVENOUS ONCE
Status: COMPLETED | OUTPATIENT
Start: 2020-09-18 | End: 2020-09-18

## 2020-09-18 RX ORDER — DIPHENHYDRAMINE HCL 25 MG
25 CAPSULE ORAL
Status: DISCONTINUED | OUTPATIENT
Start: 2020-09-18 | End: 2020-09-19 | Stop reason: HOSPADM

## 2020-09-18 RX ORDER — ACYCLOVIR 200 MG/1
0-1 CAPSULE ORAL
Status: DISCONTINUED | OUTPATIENT
Start: 2020-09-18 | End: 2020-09-19 | Stop reason: HOSPADM

## 2020-09-18 RX ORDER — DIPHENHYDRAMINE HYDROCHLORIDE 50 MG/ML
25-50 INJECTION INTRAMUSCULAR; INTRAVENOUS
Status: DISCONTINUED | OUTPATIENT
Start: 2020-09-18 | End: 2020-09-19 | Stop reason: HOSPADM

## 2020-09-18 RX ORDER — ONDANSETRON 2 MG/ML
4 INJECTION INTRAMUSCULAR; INTRAVENOUS
Status: DISCONTINUED | OUTPATIENT
Start: 2020-09-18 | End: 2020-09-19 | Stop reason: HOSPADM

## 2020-09-18 RX ORDER — METHYLPREDNISOLONE SODIUM SUCCINATE 125 MG/2ML
125 INJECTION, POWDER, LYOPHILIZED, FOR SOLUTION INTRAMUSCULAR; INTRAVENOUS
Status: DISCONTINUED | OUTPATIENT
Start: 2020-09-18 | End: 2020-09-19 | Stop reason: HOSPADM

## 2020-09-18 RX ORDER — ALBUTEROL SULFATE 90 UG/1
2 AEROSOL, METERED RESPIRATORY (INHALATION) EVERY 5 MIN PRN
Status: DISCONTINUED | OUTPATIENT
Start: 2020-09-18 | End: 2020-09-19 | Stop reason: HOSPADM

## 2020-09-18 RX ORDER — DIAZEPAM 5 MG
5 TABLET ORAL EVERY 30 MIN PRN
Status: DISCONTINUED | OUTPATIENT
Start: 2020-09-18 | End: 2020-09-19 | Stop reason: HOSPADM

## 2020-09-18 RX ADMIN — GADOBUTROL 10 ML: 604.72 INJECTION INTRAVENOUS at 16:25

## 2020-09-18 RX ADMIN — REGADENOSON 0.4 MG: 0.08 INJECTION, SOLUTION INTRAVENOUS at 15:48

## 2020-09-18 RX ADMIN — GADOBUTROL 10 ML: 604.72 INJECTION INTRAVENOUS at 16:26

## 2020-09-18 RX ADMIN — GADOBUTROL 6 ML: 604.72 INJECTION INTRAVENOUS at 16:26

## 2020-09-18 RX ADMIN — AMINOPHYLLINE 100 MG: 25 INJECTION, SOLUTION INTRAVENOUS at 15:54

## 2020-09-20 LAB
INTERPRETATION ECG - MUSE: NORMAL
INTERPRETATION ECG - MUSE: NORMAL

## 2020-09-25 NOTE — TELEPHONE ENCOUNTER
2nd Attempt LVM for Pedrito to call back to schedule a video visit with Dr Shields for around October 16 for follow up. I asked Pedrito to please call [phone] to schedule.     Drew Downing  Procedure , [place]  Peds Specialty and Adult Endocrinology

## 2020-09-28 NOTE — TELEPHONE ENCOUNTER
3rd Attempt Letter sent.     rDew Downing  Pro[facility] , [facility] Specialty and Adult Endocrinology

## 2020-09-29 ENCOUNTER — TELEPHONE (OUTPATIENT)
Dept: FAMILY MEDICINE | Facility: CLINIC | Age: 65
End: 2020-09-29

## 2020-09-29 ENCOUNTER — ALLIED HEALTH/NURSE VISIT (OUTPATIENT)
Dept: FAMILY MEDICINE | Facility: CLINIC | Age: 65
End: 2020-09-29
Payer: COMMERCIAL

## 2020-09-29 VITALS — HEART RATE: 66 BPM | SYSTOLIC BLOOD PRESSURE: 138 MMHG | DIASTOLIC BLOOD PRESSURE: 84 MMHG

## 2020-09-29 DIAGNOSIS — D64.9 ANEMIA, UNSPECIFIED TYPE: ICD-10-CM

## 2020-09-29 DIAGNOSIS — I10 ESSENTIAL HYPERTENSION: Primary | ICD-10-CM

## 2020-09-29 DIAGNOSIS — I25.2 H/O ACUTE MYOCARDIAL INFARCTION: ICD-10-CM

## 2020-09-29 DIAGNOSIS — I10 ESSENTIAL HYPERTENSION: ICD-10-CM

## 2020-09-29 DIAGNOSIS — E66.9 OBESITY WITH SERIOUS COMORBIDITY, UNSPECIFIED CLASSIFICATION, UNSPECIFIED OBESITY TYPE: ICD-10-CM

## 2020-09-29 LAB
BASOPHILS # BLD AUTO: 0 10E9/L (ref 0–0.2)
BASOPHILS NFR BLD AUTO: 0.3 %
DIFFERENTIAL METHOD BLD: ABNORMAL
EOSINOPHIL # BLD AUTO: 0.3 10E9/L (ref 0–0.7)
EOSINOPHIL NFR BLD AUTO: 4.1 %
ERYTHROCYTE [DISTWIDTH] IN BLOOD BY AUTOMATED COUNT: 13.2 % (ref 10–15)
HCT VFR BLD AUTO: 41.5 % (ref 40–53)
HGB BLD-MCNC: 13.2 G/DL (ref 13.3–17.7)
LYMPHOCYTES # BLD AUTO: 1.3 10E9/L (ref 0.8–5.3)
LYMPHOCYTES NFR BLD AUTO: 21 %
MCH RBC QN AUTO: 30.2 PG (ref 26.5–33)
MCHC RBC AUTO-ENTMCNC: 31.8 G/DL (ref 31.5–36.5)
MCV RBC AUTO: 95 FL (ref 78–100)
MONOCYTES # BLD AUTO: 0.8 10E9/L (ref 0–1.3)
MONOCYTES NFR BLD AUTO: 13.1 %
NEUTROPHILS # BLD AUTO: 3.7 10E9/L (ref 1.6–8.3)
NEUTROPHILS NFR BLD AUTO: 61.5 %
PLATELET # BLD AUTO: 184 10E9/L (ref 150–450)
RBC # BLD AUTO: 4.37 10E12/L (ref 4.4–5.9)
WBC # BLD AUTO: 6 10E9/L (ref 4–11)

## 2020-09-29 PROCEDURE — 36415 COLL VENOUS BLD VENIPUNCTURE: CPT | Performed by: FAMILY MEDICINE

## 2020-09-29 PROCEDURE — 85025 COMPLETE CBC W/AUTO DIFF WBC: CPT | Performed by: FAMILY MEDICINE

## 2020-09-29 PROCEDURE — 99207 ZZC NO CHARGE NURSE ONLY: CPT

## 2020-09-29 RX ORDER — LISINOPRIL 30 MG/1
30 TABLET ORAL DAILY
Qty: 90 TABLET | Refills: 3 | Status: SHIPPED | OUTPATIENT
Start: 2020-09-29 | End: 2021-08-24

## 2020-09-29 NOTE — TELEPHONE ENCOUNTER
Pt in today for BP check up. He states his BP was high at home 170/100 and so he increased his lisinopril to 30mg. He presents in clinic today for a bp check and is 138/84 at first attempt.  Veda Allen MA

## 2020-09-29 NOTE — RESULT ENCOUNTER NOTE
Pedrito,  Your recent studies showed your anemia is improving nicely.  Please let me know if you have any questions or concerns and follow up as discussed in clinic.    Sincerely.  Dr. SAIDA Garcia MD, FAAFP  Family Medicine Physician  [facility] [place]  [address] [place] [address]

## 2020-09-29 NOTE — TELEPHONE ENCOUNTER
Patient was seen in clinic.  Blood pressure was normal.  He is scheduling follow-up appointment with me next week.  Will bring his machine at that time as well.    Vinay Garcia MD, FAAFP  Family Medicine Physician  [facility] [place]  [address] [place] [address]

## 2020-09-30 RX ORDER — LISINOPRIL 10 MG/1
TABLET ORAL
Qty: 180 TABLET | Refills: 0 | OUTPATIENT
Start: 2020-09-30

## 2020-09-30 NOTE — TELEPHONE ENCOUNTER
Sent 09/29/2020, with 12 month supply. Should not need refills at this time    Elicia Pierce RN

## 2020-10-02 NOTE — PROGRESS NOTES
"Subjective     Pedrito Negron is a 64 year old male who presents to clinic today for the following health issues:    History of Present Illness       Hypertension: He presents for follow up of hypertension.  He does check blood pressure  regularly outside of the clinic. Outside blood pressures have been over 140/90. He follows a low salt diet.     Vascular Disease:  He presents for follow up of vascular disease.  He takes daily aspirin.    He eats 2-3 servings of fruits and vegetables daily.He consumes 3 sweetened beverage(s) daily.He exercises with enough effort to increase his heart rate 9 or less minutes per day.  He exercises with enough effort to increase his heart rate 3 or less days per week.   He is taking medications regularly.           Review of Systems   Constitutional, HEENT, cardiovascular, pulmonary, gi and gu systems are negative, except as otherwise noted.      Objective    /78   Temp 98.7  F (37.1  C)   Resp 14   Ht 1.778 m (5' 10\")   Wt 103.7 kg (228 lb 11.2 oz)   BMI 32.82 kg/m    Body mass index is 32.82 kg/m .     Physical Exam   GENERAL: healthy, alert and no distress  RESP: lungs clear to auscultation - no rales, rhonchi or wheezes  CV: regular rate and rhythm, normal S1 S2, no S3 or S4, no murmur, click or rub, no peripheral edema and peripheral pulses strong  MS: no gross musculoskeletal defects noted, no edema  NEURO: Normal strength and tone, mentation intact and speech normal  PSYCH: mentation appears normal, affect normal/bright            Assessment & Plan     Essential hypertension  64-year-old male with history of CAD and hypertension, following up because his home blood pressure monitor has been showing elevated readings.  He brought his monitor in today, his monitor is reading much higher than manual blood pressure.  He will purchase a new blood pressure machine.  He also has upcoming follow-up with his cardiologist.  He had recent stress test which showed some mild " "worsening of his CAD.  He will follow-up with me afterwards.    Need for prophylactic vaccination and inoculation against influenza  Vaccination conducted today.  - FLUZONE HIGH DOSE 65+  [30578]  - Vaccine Administration, Initial [44676]     BMI:   Estimated body mass index is 32.82 kg/m  as calculated from the following:    Height as of this encounter: 1.778 m (5' 10\").    Weight as of this encounter: 103.7 kg (228 lb 11.2 oz).            Return in about 3 months (around 1/6/2021) for Follow Up from Today's Encounter.    Vinay Garcia MD  [facility]    "

## 2020-10-06 ENCOUNTER — OFFICE VISIT (OUTPATIENT)
Dept: FAMILY MEDICINE | Facility: CLINIC | Age: 65
End: 2020-10-06
Payer: COMMERCIAL

## 2020-10-06 VITALS
HEIGHT: 70 IN | SYSTOLIC BLOOD PRESSURE: 126 MMHG | RESPIRATION RATE: 14 BRPM | BODY MASS INDEX: 32.74 KG/M2 | DIASTOLIC BLOOD PRESSURE: 78 MMHG | TEMPERATURE: 98.7 F | WEIGHT: 228.7 LBS

## 2020-10-06 DIAGNOSIS — Z23 NEED FOR PROPHYLACTIC VACCINATION AND INOCULATION AGAINST INFLUENZA: ICD-10-CM

## 2020-10-06 DIAGNOSIS — I10 ESSENTIAL HYPERTENSION: Primary | ICD-10-CM

## 2020-10-06 PROCEDURE — 99213 OFFICE O/P EST LOW 20 MIN: CPT | Mod: 25 | Performed by: FAMILY MEDICINE

## 2020-10-06 PROCEDURE — 90471 IMMUNIZATION ADMIN: CPT | Performed by: FAMILY MEDICINE

## 2020-10-06 PROCEDURE — 90662 IIV NO PRSV INCREASED AG IM: CPT | Performed by: FAMILY MEDICINE

## 2020-10-06 ASSESSMENT — MIFFLIN-ST. JEOR: SCORE: 1828.63

## 2020-10-06 ASSESSMENT — PAIN SCALES - GENERAL: PAINLEVEL: NO PAIN (0)

## 2020-10-16 ENCOUNTER — VIRTUAL VISIT (OUTPATIENT)
Dept: CARDIOLOGY | Facility: CLINIC | Age: 65
End: 2020-10-16
Payer: COMMERCIAL

## 2020-10-16 DIAGNOSIS — I20.0 UNSTABLE ANGINA (H): Primary | ICD-10-CM

## 2020-10-16 PROCEDURE — 99214 OFFICE O/P EST MOD 30 MIN: CPT | Mod: 95 | Performed by: INTERNAL MEDICINE

## 2020-10-16 NOTE — PROGRESS NOTES
"Pedrito Negron is a 64 year old male who is being evaluated via a billable video visit.      The patient has been notified of following:     \"This video visit will be conducted via a call between you and your physician/provider. We have found that certain health care needs can be provided without the need for an in-person physical exam.  This service lets us provide the care you need with a video conversation.  If a prescription is necessary we can send it directly to your pharmacy.  If lab work is needed we can place an order for that and you can then stop by our lab to have the test done at a later time.    Video visits are billed at different rates depending on your insurance coverage.  Please reach out to your insurance provider with any questions.    If during the course of the call the physician/provider feels a video visit is not appropriate, you will not be charged for this service.\"    Patient has given verbal consent for Video visit? Yes  How would you like to obtain your AVS? MyChart  If you are dropped from the video visit, the video invite should be resent to: Text to cell phone: [phone]  Will anyone else be joining your video visit? No        Video-Visit Details    Type of service:  Video Visit    Video Start Time: 2:58 PM  Video End Time: 3:28 PM     Originating Location (pt. Location): Home    Distant Location (provider location):  [facility]     Platform used for Video Visit: Sathish Shields MD    [facility] Cardiology Consultation:    -     Assessment and Plan:     1- Known CAD with multiple PCIs (inferior STEMI on 12/17/2018 s/p DIMITRI to left circumflex/OM and DIMITRI*2 to the proximal and distal RCA,  PCI to LAD in 2010 and another PCI in 2003), mildly reduced LV fxn, stable angina in the setting of significant anemia, stress CMR 09/2020 with mild-mod anterolateral ischemia (LAD or OM):  New onset stable angina, CCS class 2 in the " setting of new-onset iron deficiency anemia (Hb 14 in 2/2020 compared to 9.1 on 8/24/2020), resolved after hemoglobin recovered (Hb 13.2 on 9/29/2020)    Status:- No exertional chest pain.   - No further episodes of rectal bleed. Colonoscopy on 9/14/2020 did not identify source of bleed  -Given improvement/resolution of angina with anemia correction and nitrate therapy, and only mild to moderate ischemia on stress CMR, I think it is safe to defer invasive angiography for now.  We will optimize his medical regimen and mitigate risk factors.    Plan:  - Continue DAPT, and metoprolol succinate 25 daily  - Continue Imdur 30 mg daily  - Lipitor recently increased to 80 daily  and Ezetimibe 10 recently started. Repeat lipid panel in 2-3 months  - No need for coronary angiography since chest pain resolved on current regimen after anemia has resolved.     2. Ischemic cardiomyopathy (EF 51% on cardiac MRI 9/2020), lateral wall MI with 50% viability on CMR  - Continue lisinopril and metoprolol  - No need for diuretics (no heart failure symptoms)    3- Hypertension - was uncontrolled few weeks ago, better controlled after lisinopril increased to 30 daily  Continue lisinopril and metoprolol     4- Hyperlipidemia --  goal LDL<70  Continue lipitor 80 and ezetimibe as above. Lipid panel in 2-3 months.     5- LANDON because of rectal bleed (last Hb 13.2 in 9/2020, was 9.1 in 8/2020)  Colonoscopy did not identify source    RTC 6 months.   Case  discussed with Dr. Shields    [facility]    I have seen and examined the patient, reviewed labs and tests. I have discussed my findings and treatment recommendations with the house staff and/or Cardiology fellow and agree with their assessment and plan as outlined in the note.    Dio Shields MD    Cardiac Imaging and Prevention  [facility]  Pager: [phone]    HPI: Pedrito Negron is a 64 year old year old male who  is here for follow up.     His medical history is relevant for inferior STEMI on 12/17/2018 s/p DIMITRI to left circumflex/OM and DIMITRI*2 to the proximal and distal RCA,  PCI to LAD in [place] in 2010 and another PCI in [place] in 2003 (last  on 7/10/2020), hypertension, hyperlipidemia, LANDON because of rectal bleed, alcohol use (2 drinks a day) and diverticulitis s/p colectomy in 2001 .     He was last seen on 9/4/2020: his LDL was 150 so atorvastatin was increased to 80 and ezetimibe started..    He has no further episodes of chest pain since  his hemoglobin improved from 9 to 13.  He had a colonoscopy which which did not show any source of rectal bleed.    His BP was uncontrolled (SBP in the 140s-150s) so lisinopril was increased from 20 mg to 30 mg daily and his current BP was better controlled.  He is compliant with his medications and is trying to decrease his alcohol intake.    He denies having  shortness of breath, orthopnea, cough, edema, palpitations, PND, lightheadedness or syncope.  No significant weight gain or loss. Memory is not impaired.        EXAM:  GEN/CONSTITUIONAL: Appears comfortable, in no apparent distress   EYES: No icterus noted.   JVP:  Not visible  RESPIRATORY: No cough or labored breathing   NEUROLOGIC: No tremor noted  PSYCHIATRIC: Normal affect  EXT: No edema noted.  Skin: No rash visible  The rest of a comprehensive physical examination is deferred due to PHE (public health emergency) video visit restrictions.      PAST MEDICAL HISTORY:  Past Medical History:   Diagnosis Date     Diverticulitis      Hypertension        CURRENT MEDICATIONS:  Current Outpatient Medications   Medication     alclomethasone (ACLOVATE) 0.05 % external cream     aspirin (ASA) 81 MG EC tablet     atorvastatin (LIPITOR) 80 MG tablet     bisacodyl (DULCOLAX) 5 MG EC tablet     clopidogrel (PLAVIX) 75 MG tablet     ezetimibe (ZETIA) 10 MG tablet     isosorbide mononitrate (IMDUR) 30 MG 24 hr tablet     lisinopril (ZESTRIL)  30 MG tablet     metoprolol succinate ER (TOPROL-XL) 25 MG 24 hr tablet     Multiple Vitamin (MULTI-VITAMINS) TABS     nitroGLYcerin (NITROSTAT) 0.4 MG sublingual tablet     omeprazole (PRILOSEC) 20 MG DR capsule     polyethylene glycol (GOLYTELY) 236 g suspension     Simethicone 125 MG TABS     No current facility-administered medications for this visit.        PAST SURGICAL HISTORY:  Past Surgical History:   Procedure Laterality Date     COLONOSCOPY WITH CO2 INSUFFLATION N/A 2020    Procedure: COLONOSCOPY, WITH CO2 INSUFFLATION;  Surgeon: Melinda Saleem MD;  Location: [facility] OR     large bowel resection      diverticulitis     STENT  2018    cardiac       ALLERGIES   No Known Allergies    FAMILY HISTORY:  Family History   Problem Relation Age of Onset     Diabetes Sister        SOCIAL HISTORY:  Social History     Socioeconomic History     Marital status: Single     Spouse name: Not on file     Number of children: Not on file     Years of education: Not on file     Highest education level: Not on file   Occupational History     Not on file   Social Needs     Financial resource strain: Not on file     Food insecurity     Worry: Not on file     Inability: Not on file     Transportation needs     Medical: Not on file     Non-medical: Not on file   Tobacco Use     Smoking status: Former Smoker     Types: Cigarettes     Quit date:      Years since quittin.8     Smokeless tobacco: Never Used   Substance and Sexual Activity     Alcohol use: Not Currently     Drug use: Never     Sexual activity: Not Currently   Lifestyle     Physical activity     Days per week: Not on file     Minutes per session: Not on file     Stress: Not on file   Relationships     Social connections     Talks on phone: Not on file     Gets together: Not on file     Attends [employer] service: Not on file     Active member of club or organization: Not on file     Attends meetings of clubs or organizations: Not on file     Relationship  status: Not on file     Intimate partner violence     Fear of current or ex partner: Not on file     Emotionally abused: Not on file     Physically abused: Not on file     Forced sexual activity: Not on file   Other Topics Concern     Parent/sibling w/ CABG, MI or angioplasty before 65F 55M? Not Asked   Social History Narrative     Not on file       ROS:   Constitutional: No fever, chills, or sweats. No weight gain/loss   ENT: No visual disturbance, ear ache, epistaxis, sore throat  Allergies/Immunologic: Negative.   Respiratory: No cough, hemoptysia  Cardiovascular: As per HPI  GI: No nausea, vomiting, hematemesis, melena, or hematochezia  : No urinary frequency, dysuria, or hematuria  Integument: Negative  Psychiatric: Negative  Neuro: Negative  Endocrinology: Negative   Musculoskeletal: Negative      ADDITIONAL COMMENTS:       I reviewed the patient's medications    I reviewed the patient's pertinent clinical laboratory studies:       CBC RESULTS:   Recent Labs   Lab Test 09/29/20  1307   WBC 6.0   RBC 4.37*   HGB 13.2*   HCT 41.5   MCV 95   MCH 30.2   MCHC 31.8   RDW 13.2          Last Comprehensive Metabolic Panel:  Sodium   Date Value Ref Range Status   08/19/2020 137 133 - 144 mmol/L Final     Potassium   Date Value Ref Range Status   08/19/2020 4.6 3.4 - 5.3 mmol/L Final     Chloride   Date Value Ref Range Status   08/19/2020 104 94 - 109 mmol/L Final     Carbon Dioxide   Date Value Ref Range Status   08/19/2020 29 20 - 32 mmol/L Final     Anion Gap   Date Value Ref Range Status   08/19/2020 4 3 - 14 mmol/L Final     Glucose   Date Value Ref Range Status   08/19/2020 101 (H) 70 - 99 mg/dL Final     Urea Nitrogen   Date Value Ref Range Status   08/19/2020 13 7 - 30 mg/dL Final     Creatinine   Date Value Ref Range Status   08/19/2020 1.10 0.66 - 1.25 mg/dL Final     GFR Estimate   Date Value Ref Range Status   08/19/2020 70 >60 mL/min/[1.73_m2] Final     Comment:     Non  GFR  Calc  Starting 12/18/2018, serum creatinine based estimated GFR (eGFR) will be   calculated using the Chronic Kidney Disease Epidemiology Collaboration   (CKD-EPI) equation.       Calcium   Date Value Ref Range Status   08/19/2020 8.9 8.5 - 10.1 mg/dL Final       Lab Results   Component Value Date    AST 36 08/19/2020     Lab Results   Component Value Date    ALT 49 08/19/2020     No results found for: BILICONJ   Lab Results   Component Value Date    BILITOTAL 0.3 08/19/2020     Lab Results   Component Value Date    ALBUMIN 3.6 08/19/2020     Lab Results   Component Value Date    PROTTOTAL 7.4 08/19/2020      Lab Results   Component Value Date    ALKPHOS 109 08/19/2020         No results found for: TROPI    No results found for: INR     TSH   Date Value Ref Range Status   08/19/2020 1.98 0.40 - 4.00 mU/L Final       Cholesterol   Date Value Ref Range Status   07/10/2020 258 (H) <200 mg/dL Final     Comment:     Desirable:       <200 mg/dl   02/19/2020 197 <200 mg/dL Final     HDL Cholesterol   Date Value Ref Range Status   07/10/2020 46 >39 mg/dL Final   02/19/2020 53 >39 mg/dL Final     LDL Cholesterol Calculated   Date Value Ref Range Status   07/10/2020 151 (H) <100 mg/dL Final     Comment:     Above desirable:  100-129 mg/dl  Borderline High:  130-159 mg/dL  High:             160-189 mg/dL  Very high:       >189 mg/dl     02/19/2020  <100 mg/dL Final    Cannot estimate LDL when triglyceride exceeds 400 mg/dL     LDL Cholesterol Direct   Date Value Ref Range Status   02/19/2020 84 <100 mg/dL Final     Comment:     Desirable:       <100 mg/dl     Triglycerides   Date Value Ref Range Status   07/10/2020 305 (H) <150 mg/dL Final     Comment:     Borderline high:  150-199 mg/dl  High:             200-499 mg/dl  Very high:       >499 mg/dl     02/19/2020 535 (H) <150 mg/dL Final     Comment:     Borderline high:  150-199 mg/dl  High:             200-499 mg/dl  Very high:       >499 mg/dl           I reviewed the  patient's pertinent imaging studies:       Last TTE OSH 12/17/2018  Interface, [facility]lt In - 12/17/2018  4:30 PM CST  Interpretation Summary    * The left ventricular systolic function is normal, estimated LVEF 55-60%.    * Moderate to severe posterobasal, inferolateral basal and lateral  hypokinesis.    * There is moderate concentric left ventricular hypertrophy.    * There is no significant mitral regurgitation.    * Due to inadequate tricuspid regurgitant velocity, unable to calculate  right ventricular systolic pressure.    * No prior study.        Coronary angiogram and PCI 12/17/2018  Conclusions    1. CAD as described.    2. PCI was performed to the left circumflex/obtuse marginal branch using a  single drug-eluting stent. Excellent result was obtained.    3. PCI was performed to the right coronary artery. One Drug-eluting stent  each was placed in the proximal RCA and in the distal RCA. Excellent result  was obtained.      Diagnostic Summary    * Left Main is a large caliber normal vessel.    * Left anterior descending artery is a large caliber vessel with one major  diagonal branch. There is a previously placed stent in the diagonal branch.  Only mild irregularities are noted in the LAD and the diagonal branch.    * Left circumflex is a large caliber vessel and has previously placed stents  in the entire proximal vessel and a large first obtuse marginal branch. In the  midportion of this vessel, there is 95% narrowing with haziness and possible  clot within the previously stented segment. This is likely the culprit lesion.    * There is a small caliber ramus intermedius vessel which has a previously  placed stent at the ostium. It is occluded at the ostium and the distal vessel  is filled via collaterals. It has an appearance of a chronic occlusion.    * Right coronary artery is a large caliber dominant vessel giving rise to a  medium caliber PDA and a medium caliber posterolateral branch. The  proximal  RCA has severe 90% stenosis. Distal right coronary artery has diffuse 70%  narrowing just before the bifurcation.      Interventional Summary    * PCI was performed to the left circumflex/obtuse marginal branch using a  single drug-eluting stent. Excellent result was obtained.    * PCI was performed to the right coronary artery. One Drug-eluting stent  each was placed in the proximal RCA and in the distal RCA. Excellent result  was obtained.       Stress cardiac MRI 9/18/2020  1. The LV is normal in cavity size and wall thickness. The global systolic function is mildly decreased.  The LVEF is 51%. There is akinesis of the basal and mid lateral segments and hypokinesis of the apical  lateral segment.     2. The RV is normal in cavity size. The global systolic function is normal. The RVEF is 58%.      3. Both atria are normal in size.     4. There is no significant valvular disease. The aortic valve is trileaflet in morphology.     5. Late gadolinium enhancement imaging shows subendocardial LGE involving the basal and mid lateralsegments, consistent with a MI in the left circumflex coronary artery territory. There is approximately 50% viability in this territory. There is 100% viability of the LAD and RCA coronary artery territories.     6. Regadenoson stress perfusion imaging shows perfusion territories that match the above-mentioned area of MI in the LCx territory. In addition, there are perfusion defects in the basal and mid anterior segments, and the mid septal segments, consistent with ischemia without MI in the LAD coronary territory.     7. There is no pericardial effusion or thickening.     8. There is no intracardiac thrombus.     CONCLUSIONS: Ischemic cardiomyopathy with LVEF of 51%. Moderate-sized LCx MI with approximately 50% residual viability. Ischemia in the LAD territory.      I reviewed the patient's ECG:       ECG 9/18/2020  Sinus rhythm      Nghia Lacy MD  PGY-4 cardiology  fellow  [facility]  [email or web address] I Pager: [phone]

## 2020-10-29 DIAGNOSIS — I21.3 ST ELEVATION MYOCARDIAL INFARCTION (STEMI), UNSPECIFIED ARTERY (H): ICD-10-CM

## 2020-10-30 RX ORDER — CLOPIDOGREL BISULFATE 75 MG/1
TABLET ORAL
Qty: 60 TABLET | Refills: 3 | Status: SHIPPED | OUTPATIENT
Start: 2020-10-30 | End: 2021-06-25

## 2020-10-30 NOTE — TELEPHONE ENCOUNTER
"Requested Prescriptions   Pending Prescriptions Disp Refills     clopidogrel (PLAVIX) 75 MG tablet [Pharmacy Med Name: CLOPIDOGREL 75MG TABLETS] 60 tablet 3     Sig: TAKE 1 TABLET(75 MG) BY MOUTH DAILY       Plavix Failed - 10/29/2020  6:33 PM        Failed - No active PPI on record unless is Protonix        Failed - Normal HGB on file in past 12 months     Recent Labs   Lab Test 09/29/20  1307   HGB 13.2*               Passed - Normal Platelets on file in past 12 months     Recent Labs   Lab Test 09/29/20  1307                  Passed - Recent (12 mo) or future (30 days) visit within the authorizing provider's specialty     Patient has had an office visit with the authorizing provider or a provider within the authorizing providers department within the previous 12 mos or has a future within next 30 days. See \"Patient Info\" tab in inbasket, or \"Choose Columns\" in Meds & Orders section of the refill encounter.              Passed - Medication is active on med list        Passed - Patient is age 18 or older           Routing refill request to provider for review/approval because:  Labs out of range:  HGB    Jesse Martinez, RN, BSN          "

## 2020-12-23 ENCOUNTER — TELEPHONE (OUTPATIENT)
Dept: FAMILY MEDICINE | Facility: CLINIC | Age: 65
End: 2020-12-23

## 2020-12-23 DIAGNOSIS — I20.0 UNSTABLE ANGINA (H): ICD-10-CM

## 2020-12-23 NOTE — TELEPHONE ENCOUNTER
Summary:    Patient is due/failing the following:   Hypertension follow up and labs( Ferritin, Folate and Vitamin B12)    Action needed:   Patient needs office visit for follow up.    Type of outreach:    Phone, left message for patient to call back.     Questions for provider review:    None                                                                                                                                    Karen Rosen CMA       Chart routed to Care Team .          Panel Management Review      Patient has the following on his problem list:     Hypertension   Last three blood pressure readings:  BP Readings from Last 3 Encounters:   10/06/20 126/78   09/29/20 138/84   09/18/20 (!) 150/95     Blood pressure: Passed    HTN Guidelines:  Less than 140/90      Composite cancer screening  Chart review shows that this patient is due/due soon for the following None

## 2020-12-29 DIAGNOSIS — I20.0 UNSTABLE ANGINA (H): ICD-10-CM

## 2021-01-04 RX ORDER — ISOSORBIDE MONONITRATE 30 MG/1
30 TABLET, EXTENDED RELEASE ORAL DAILY
Qty: 90 TABLET | Refills: 1 | Status: SHIPPED | OUTPATIENT
Start: 2021-01-04 | End: 2021-09-14

## 2021-01-25 NOTE — TELEPHONE ENCOUNTER
Spoke to patient and scheduled follow up with fasting labs.     Patient needs a refill on the following medication as he is out:    Pending Prescriptions:                       Disp   Refills    nitroGLYcerin (NITROSTAT) 0.4 MG sublingu*25 tab*1            Sig: For chest pain place 1 tablet under the tongue           every 5 minutes for 3 doses. If symptoms persist           5 minutes after 1st dose call 911.    Karen Rosen [facility]

## 2021-02-05 RX ORDER — NITROGLYCERIN 0.4 MG/1
TABLET SUBLINGUAL
Qty: 25 TABLET | Refills: 0 | Status: SHIPPED | OUTPATIENT
Start: 2021-02-05 | End: 2022-06-17

## 2021-04-25 ENCOUNTER — HEALTH MAINTENANCE LETTER (OUTPATIENT)
Age: 66
End: 2021-04-25

## 2021-05-02 NOTE — LETTER
[facility] [place]  [address]  [place] [address]  [phone]          May 5, 2021    Pedrito Negron                                                                                                                     [address]   [place] [address]            Hi Pedrito,      We received a refill on your Omeprazole medication, it looks like you are due for your annual medicare exam, you can call us at [phone] or you can schedule through your Team [employer].      Thank you,   Have a great day!  Your ealth/[facility] Team

## 2021-05-03 NOTE — TELEPHONE ENCOUNTER
Left message for pt to return our call    Please schedule annual Medicare exam.  We may need to change medication at that time.

## 2021-05-03 NOTE — TELEPHONE ENCOUNTER
Pending Prescriptions:                       Disp   Refills    omeprazole (PRILOSEC) 20 MG DR capsule [Ph*90 cap*         Sig: TAKE 1 CAPSULE BY MOUTH DAILY 1 HOUR BEFORE A MEAL      Routing refill request to provider for review/approval because:  Drug not active on patient's medication list    Hien Clement RN on 5/3/2021 at 3:49 PM

## 2021-05-07 ENCOUNTER — TELEPHONE (OUTPATIENT)
Dept: FAMILY MEDICINE | Facility: CLINIC | Age: 66
End: 2021-05-07

## 2021-05-07 DIAGNOSIS — I10 ESSENTIAL HYPERTENSION: ICD-10-CM

## 2021-05-07 DIAGNOSIS — E78.5 HYPERLIPIDEMIA LDL GOAL <100: ICD-10-CM

## 2021-05-07 DIAGNOSIS — D64.9 ANEMIA, UNSPECIFIED TYPE: ICD-10-CM

## 2021-05-07 LAB
ANION GAP SERPL CALCULATED.3IONS-SCNC: 4 MMOL/L (ref 3–14)
BUN SERPL-MCNC: 17 MG/DL (ref 7–30)
CALCIUM SERPL-MCNC: 9.7 MG/DL (ref 8.5–10.1)
CHLORIDE SERPL-SCNC: 107 MMOL/L (ref 94–109)
CHOLEST SERPL-MCNC: 160 MG/DL
CO2 SERPL-SCNC: 28 MMOL/L (ref 20–32)
CREAT SERPL-MCNC: 1.09 MG/DL (ref 0.66–1.25)
FERRITIN SERPL-MCNC: 66 NG/ML (ref 26–388)
FOLATE SERPL-MCNC: 59.9 NG/ML
GFR SERPL CREATININE-BSD FRML MDRD: 70 ML/MIN/{1.73_M2}
GLUCOSE SERPL-MCNC: 89 MG/DL (ref 70–99)
HDLC SERPL-MCNC: 54 MG/DL
LDLC SERPL CALC-MCNC: 74 MG/DL
NONHDLC SERPL-MCNC: 106 MG/DL
POTASSIUM SERPL-SCNC: 4.7 MMOL/L (ref 3.4–5.3)
SODIUM SERPL-SCNC: 139 MMOL/L (ref 133–144)
TRIGL SERPL-MCNC: 161 MG/DL
VIT B12 SERPL-MCNC: 744 PG/ML (ref 193–986)

## 2021-05-07 PROCEDURE — 80061 LIPID PANEL: CPT | Performed by: FAMILY MEDICINE

## 2021-05-07 PROCEDURE — 80048 BASIC METABOLIC PNL TOTAL CA: CPT | Performed by: FAMILY MEDICINE

## 2021-05-07 PROCEDURE — 82728 ASSAY OF FERRITIN: CPT | Performed by: FAMILY MEDICINE

## 2021-05-07 PROCEDURE — 82607 VITAMIN B-12: CPT | Performed by: FAMILY MEDICINE

## 2021-05-07 PROCEDURE — 36415 COLL VENOUS BLD VENIPUNCTURE: CPT | Performed by: FAMILY MEDICINE

## 2021-05-07 PROCEDURE — 82746 ASSAY OF FOLIC ACID SERUM: CPT | Performed by: FAMILY MEDICINE

## 2021-05-07 NOTE — TELEPHONE ENCOUNTER
Reason for Call:  Form, our goal is to have forms completed with 72 hours, however, some forms may require a visit or additional information.    Type of letter, form or note:  Physician result form    Who is the form from?: Insurance comp    Where did the form come from: Patient or family brought in       What clinic location was the form placed at?: [facility] - [phone]    Where the form was placed:  Box/Folder    What number is listed as a contact on the form?: [phone]        Additional comments: Patient has appointment on Friday 5/14/2021 for physical will discuss form with Alderman moscoso.    Call taken on 5/7/2021 at 7:20 AM by Eva Everett

## 2021-05-07 NOTE — TELEPHONE ENCOUNTER
This form is for pt appt next week Friday, 5/14/21    Will place in provider bin to hold on to until appt since we are team nursing. Note added to the appt notes as a reminder of the form.

## 2021-05-07 NOTE — RESULT ENCOUNTER NOTE
Pedrito,  Your recent studies look fine.  Please let me know if you have any questions or concerns and follow up as discussed in clinic.    Sincerely.  Dr. SAIDA Garcia MD, FAAFP  Family Medicine Physician  [facility]  [address] [place] [address]

## 2021-05-11 NOTE — PROGRESS NOTES
"`SUBJECTIVE:   Pedrito Negron is a 65 year old male who presents for Preventive Visit.      Patient has been advised of split billing requirements and indicates understanding: Yes   Are you in the first 12 months of your Medicare coverage?  No on medicate coverage.     Healthy Habits:     In general, how would you rate your overall health?  Good    Frequency of exercise:  1 day/week    Duration of exercise:  Less than 15 minutes    Do you usually eat at least 4 servings of fruit and vegetables a day, include whole grains    & fiber and avoid regularly eating high fat or \"junk\" foods?  Yes    Taking medications regularly:  Yes    Medication side effects:  Other    Ability to successfully perform activities of daily living:  No assistance needed    Home Safety:  No safety concerns identified    Hearing Impairment:  No hearing concerns    In the past 6 months, have you been bothered by leaking of urine?  No    In general, how would you rate your overall mental or emotional health?  Excellent      PHQ-2 Total Score: 0    Additional concerns today:  Yes (form that was dropped off last week. wants to ask about a rash on his head.- maybe a shampoo to help. Feet are bothering him. Notices that his urine stream is low. dry eyes. acid reflux meds)    Do you feel safe in your environment? Yes    Have you ever done Advance Care Planning? (For example, a Health Directive, POLST, or a discussion with a medical provider or your loved ones about your wishes): Yes, patient states has an Advance Care Planning document and will bring a copy to the clinic.      Fall risk  Fallen 2 or more times in the past year?: No  Any fall with injury in the past year?: No    Cognitive Screening   1) Repeat 3 items (Leader, Season, Table)    2) Clock draw: NORMAL  3) 3 item recall: Recalls 3 objects  Results: 3 items recalled: COGNITIVE IMPAIRMENT LESS LIKELY    Mini-CogTM Copyright S [employer]. Licensed by the author for use in [employer] " Services; reprinted with permission (soob@.[email or web address]). All rights reserved.      Do you have sleep apnea, excessive snoring or daytime drowsiness?: no    Reviewed and updated as needed this visit by clinical staff                 Reviewed and updated as needed this visit by Provider                Social History     Tobacco Use     Smoking status: Former Smoker     Types: Cigarettes     Quit date:      Years since quittin.3     Smokeless tobacco: Never Used   Substance Use Topics     Alcohol use: Not Currently     If you drink alcohol do you typically have >3 drinks per day or >7 drinks per week? No    Alcohol Use 2021   Prescreen: >3 drinks/day or >7 drinks/week? No   Prescreen: >3 drinks/day or >7 drinks/week? -       Current providers sharing in care for this patient include:   Patient Care Team:  Vinay Garcia MD as PCP - General (Family Practice)  Vinay Garcia MD as Assigned PCP  Dio Shields MD as Assigned Heart and Vascular Provider    The following health maintenance items are reviewed in Epic and correct as of today:  Health Maintenance Due   Topic Date Due     ANNUAL REVIEW OF HM ORDERS  Never done     AORTIC ANEURYSM SCREENING (SYSTEM ASSIGNED)  Never done     Pneumococcal Vaccine: Pediatrics (0 to 5 Years) and At-Risk Patients (6 to 64 Years) (1 of 2 - PPSV23) 2020     Pneumococcal Vaccine: 65+ Years (1 of 2 - PPSV23) 2020     FALL RISK ASSESSMENT  2021     Lab work is in process  Labs reviewed in EPIC  BP Readings from Last 3 Encounters:   21 132/70   10/06/20 126/78   20 138/84    Wt Readings from Last 3 Encounters:   21 102.7 kg (226 lb 8 oz)   10/06/20 103.7 kg (228 lb 11.2 oz)   20 103.6 kg (228 lb 4.8 oz)                  Any new diagnosis of family breast, ovarian, or bowel cancer? No      FSH-7: No flowsheet data found.          Review of Systems   Constitutional: Negative for chills.   HENT: Negative for congestion and ear  "pain.    Eyes: Negative for pain.   Respiratory: Negative for cough.    Cardiovascular: Negative for chest pain.   Gastrointestinal: Negative for abdominal pain, constipation, diarrhea and hematochezia.   Genitourinary: Negative for hematuria.   Neurological: Negative for dizziness.   Psychiatric/Behavioral: The patient is not nervous/anxious.          OBJECTIVE:   /70   Pulse 75   Temp 97.6  F (36.4  C) (Temporal)   Resp 14   Ht 1.651 m (5' 5\")   Wt 102.7 kg (226 lb 8 oz)   SpO2 98%   BMI 37.69 kg/m   Estimated body mass index is 37.69 kg/m  as calculated from the following:    Height as of this encounter: 1.651 m (5' 5\").    Weight as of this encounter: 102.7 kg (226 lb 8 oz).  Physical Exam  GENERAL: healthy, alert and no distress  EYES: Eyes grossly normal to inspection, PERRL and conjunctivae and sclerae normal  HENT: ear canals and TM's normal, nose and mouth without ulcers or lesions  NECK: no adenopathy, no asymmetry, masses, or scars and thyroid normal to palpation  RESP: lungs clear to auscultation - no rales, rhonchi or wheezes  CV: regular rate and rhythm, normal S1 S2, no S3 or S4, no murmur, click or rub, no peripheral edema and peripheral pulses strong  ABDOMEN: soft, nontender, no hepatosplenomegaly, no masses and bowel sounds normal   (male): normal male genitalia without lesions or urethral discharge, no hernia  RECTAL: normal sphincter tone, no rectal masses and prostate mildly and symmetrically enlarged, no prostatic masses, however elongated 1 cm linear firm mass at 6:00 inside rectal vault.  This is not stool.  Adhered to rectal wall.  MS: no gross musculoskeletal defects noted, no edema  SKIN: no suspicious lesions or rashes  NEURO: Normal strength and tone, mentation intact and speech normal  PSYCH: mentation appears normal, affect normal/bright    Diagnostic Test Results:  Labs reviewed in Epic    ASSESSMENT / PLAN:   1. Encounter for Medicare annual wellness exam  65-year-old " "male here for annual Medicare exam.  We discussed current cancer screening guidelines.  See below for other issues addressed.    2. Itchy scalp  He has had some improvement with his scalp itchiness using Selsun Blue, however now symptoms seem to be worsening.  He will try coconut oil.  If that does not work he would like to try over-the-counter hydrocortisone, I advised him to ensure he only uses it for 5 days at a time, cover with hat or sunscreen at all times.    3. Morbid obesity (H)  BMI currently 37.69.    4. Urinary hesitancy  Does have some worsening urinary hesitancy, suspect BPH.  Prior to starting medication I will await PSA.    5. Dry eyes  Only seems to occur at night, has been using Visine.  We discussed rebound redness related to Visine.  He will switch to natural tears and advise me of progress.    6. Mass in rectum  Uncertain etiology, residual hemorrhoid tissue possible, however it is more firm and not tender than I would expect.  He also has a fissure in between his gluteal cleft.  Sending to colorectal surgery for further evaluation.  - COLORECTAL SURGERY REFERRAL    7. Gastroesophageal reflux disease without esophagitis  He has had some improvement with omeprazole, will increase to 40 mg daily, follow-up no improvement or any worsening.  Will consider EGD at that time.  - omeprazole (PRILOSEC) 40 MG DR capsule; Take 1 capsule (40 mg) by mouth daily  Dispense: 90 capsule; Refill: 1    8. Screening for prostate cancer  Obtaining PSA today.  - PSA, screen    Patient has been advised of split billing requirements and indicates understanding: Yes  COUNSELING:  Reviewed preventive health counseling, as reflected in patient instructions       Regular exercise       Healthy diet/nutrition       Bladder control       Colon cancer screening    Estimated body mass index is 32.82 kg/m  as calculated from the following:    Height as of 10/6/20: 1.778 m (5' 10\").    Weight as of 10/6/20: 103.7 kg (228 lb 11.2 " oz).    Weight management plan: Discussed healthy diet and exercise guidelines    He reports that he quit smoking about 20 years ago. His smoking use included cigarettes. He has never used smokeless tobacco.      Appropriate preventive services were discussed with this patient, including applicable screening as appropriate for cardiovascular disease, diabetes, osteopenia/osteoporosis, and glaucoma.  As appropriate for age/gender, discussed screening for colorectal cancer, prostate cancer, breast cancer, and cervical cancer. Checklist reviewing preventive services available has been given to the patient.    Reviewed patients plan of care and provided an AVS. The Basic Care Plan (routine screening as documented in Health Maintenance) for Pedrito meets the Care Plan requirement. This Care Plan has been established and reviewed with the Patient.    Counseling Resources:  ATP IV Guidelines  Pooled Cohorts Equation Calculator  Breast Cancer Risk Calculator  Breast Cancer: Medication to Reduce Risk  FRAX Risk Assessment  ICSI Preventive Guidelines  Dietary Guidelines for Americans, 2010  USDA's MyPlate  ASA Prophylaxis  Lung CA Screening    Vinay Garcia MD  [facility]    Identified Health Risks:

## 2021-05-11 NOTE — PATIENT INSTRUCTIONS
Patient Education   Personalized Prevention Plan  You are due for the preventive services outlined below.  Your care team is available to assist you in scheduling these services.  If you have already completed any of these items, please share that information with your care team to update in your medical record.  Health Maintenance Due   Topic Date Due     ANNUAL REVIEW OF HM ORDERS  Never done     AORTIC ANEURYSM SCREENING (SYSTEM ASSIGNED)  Never done     Pneumococcal Vaccine (1 of 2 - PPSV23) 04/08/2020     Pneumococcal Vaccine (1 of 2 - PPSV23) 04/08/2020     PHQ-2  01/01/2021     Annual Wellness Visit  02/12/2021     FALL RISK ASSESSMENT  02/12/2021

## 2021-05-14 ENCOUNTER — OFFICE VISIT (OUTPATIENT)
Dept: FAMILY MEDICINE | Facility: CLINIC | Age: 66
End: 2021-05-14
Payer: COMMERCIAL

## 2021-05-14 VITALS
OXYGEN SATURATION: 98 % | BODY MASS INDEX: 37.74 KG/M2 | DIASTOLIC BLOOD PRESSURE: 70 MMHG | RESPIRATION RATE: 14 BRPM | TEMPERATURE: 97.6 F | SYSTOLIC BLOOD PRESSURE: 132 MMHG | HEIGHT: 65 IN | WEIGHT: 226.5 LBS | HEART RATE: 75 BPM

## 2021-05-14 DIAGNOSIS — K21.9 GASTROESOPHAGEAL REFLUX DISEASE WITHOUT ESOPHAGITIS: ICD-10-CM

## 2021-05-14 DIAGNOSIS — Z12.5 SCREENING FOR PROSTATE CANCER: ICD-10-CM

## 2021-05-14 DIAGNOSIS — E66.01 MORBID OBESITY (H): ICD-10-CM

## 2021-05-14 DIAGNOSIS — L29.9 ITCHY SCALP: ICD-10-CM

## 2021-05-14 DIAGNOSIS — R39.11 URINARY HESITANCY: ICD-10-CM

## 2021-05-14 DIAGNOSIS — K62.89 MASS IN RECTUM: ICD-10-CM

## 2021-05-14 DIAGNOSIS — H04.123 DRY EYES: ICD-10-CM

## 2021-05-14 DIAGNOSIS — Z00.00 ENCOUNTER FOR MEDICARE ANNUAL WELLNESS EXAM: Primary | ICD-10-CM

## 2021-05-14 PROCEDURE — G0103 PSA SCREENING: HCPCS | Performed by: FAMILY MEDICINE

## 2021-05-14 PROCEDURE — 36415 COLL VENOUS BLD VENIPUNCTURE: CPT | Performed by: FAMILY MEDICINE

## 2021-05-14 PROCEDURE — 99397 PER PM REEVAL EST PAT 65+ YR: CPT | Performed by: FAMILY MEDICINE

## 2021-05-14 PROCEDURE — 99214 OFFICE O/P EST MOD 30 MIN: CPT | Mod: 25 | Performed by: FAMILY MEDICINE

## 2021-05-14 RX ORDER — OMEPRAZOLE 40 MG/1
40 CAPSULE, DELAYED RELEASE ORAL DAILY
Qty: 90 CAPSULE | Refills: 1 | Status: SHIPPED | OUTPATIENT
Start: 2021-05-14 | End: 2021-12-31

## 2021-05-14 ASSESSMENT — MIFFLIN-ST. JEOR: SCORE: 1734.28

## 2021-05-14 ASSESSMENT — ENCOUNTER SYMPTOMS
HEMATOCHEZIA: 0
EYE PAIN: 0
ABDOMINAL PAIN: 0
COUGH: 0
DIZZINESS: 0
CHILLS: 0
CONSTIPATION: 0
NERVOUS/ANXIOUS: 0
DIARRHEA: 0
HEMATURIA: 0

## 2021-05-14 ASSESSMENT — ACTIVITIES OF DAILY LIVING (ADL): CURRENT_FUNCTION: NO ASSISTANCE NEEDED

## 2021-05-17 DIAGNOSIS — N40.1 BENIGN PROSTATIC HYPERPLASIA WITH URINARY HESITANCY: Primary | ICD-10-CM

## 2021-05-17 DIAGNOSIS — R39.11 BENIGN PROSTATIC HYPERPLASIA WITH URINARY HESITANCY: Primary | ICD-10-CM

## 2021-05-17 LAB — PSA SERPL-ACNC: 1.01 UG/L (ref 0–4)

## 2021-05-17 RX ORDER — TAMSULOSIN HYDROCHLORIDE 0.4 MG/1
0.4 CAPSULE ORAL DAILY
Qty: 90 CAPSULE | Refills: 1 | Status: SHIPPED | OUTPATIENT
Start: 2021-05-17 | End: 2021-12-31

## 2021-05-17 NOTE — RESULT ENCOUNTER NOTE
Pedrito,  Your recent studies showed a normal prostate lab.  I have ordered the Flomax that we discussed, please me know if this helps with your urinary symptoms.  Please let me know if you have any questions or concerns and follow up as discussed in clinic.    Sincerely.  Dr. SAIDA Garcia MD, [facility]  Family Medicine Physician  [facility] [place]  [address] [place] [address]

## 2021-05-20 ENCOUNTER — TELEPHONE (OUTPATIENT)
Dept: SURGERY | Facility: CLINIC | Age: 66
End: 2021-05-20

## 2021-05-20 NOTE — TELEPHONE ENCOUNTER
NOVA with clinic pod number, for scheduling referral:  With: Dr. Ellis Huggins or Dr. Silver   Referring Provider: Vinay Parisi   For / Appt Notes: rectal mass, neoplasm versus residual hemorrhoid tissue   Appt Type: UMP New   Appt Date/Time: 1-2 weeks can take new cancer slot

## 2021-05-24 NOTE — TELEPHONE ENCOUNTER
RECORDS RECEIVED FROM: Internal  rectal mass, neoplasm versus residual hemorrhoid tissue    DATE RECEIVED: 5.27.21   NOTES STATUS DETAILS   OFFICE NOTE from referring provider  Internal 5.14.21 LYNNE Garcia [facility]   OFFICE NOTE from other specialist   N/A    DISCHARGE SUMMARY from hospital  N/A    DISCHARGE REPORT from the ER N/A    OPERATIVE REPORT  N/A    MEDICATION LIST Internal    LABS     PFC TESTING N/A    ANAL PAP N/A    BIOPSIES/PATHOLOGY RELATED TO DIAGNOSIS Internal 9.14.20 Colon polyp   DIAGNOSTIC PROCEDURES     COLONOSCOPY Internal 9.14.20 [facility]   UPPER ENDOSCOPY (EGD) N/A    FLEX SIGMOIDOSCOPY  N/A    ERCP N/A    IMAGING (DISC & REPORT)      CT  N/A    MRI N/A    XRAY N/A    ULTRASOUND (ENDOANAL/ENDORECTAL) N/A

## 2021-05-27 ENCOUNTER — TELEPHONE (OUTPATIENT)
Dept: FAMILY MEDICINE | Facility: CLINIC | Age: 66
End: 2021-05-27

## 2021-05-27 ENCOUNTER — PRE VISIT (OUTPATIENT)
Dept: SURGERY | Facility: CLINIC | Age: 66
End: 2021-05-27

## 2021-05-27 ENCOUNTER — OFFICE VISIT (OUTPATIENT)
Dept: SURGERY | Facility: CLINIC | Age: 66
End: 2021-05-27
Payer: COMMERCIAL

## 2021-05-27 ENCOUNTER — MYC MEDICAL ADVICE (OUTPATIENT)
Dept: FAMILY MEDICINE | Facility: CLINIC | Age: 66
End: 2021-05-27

## 2021-05-27 VITALS
HEART RATE: 100 BPM | DIASTOLIC BLOOD PRESSURE: 73 MMHG | WEIGHT: 227 LBS | SYSTOLIC BLOOD PRESSURE: 111 MMHG | HEIGHT: 70 IN | BODY MASS INDEX: 32.5 KG/M2 | OXYGEN SATURATION: 96 %

## 2021-05-27 DIAGNOSIS — L30.9 PERIANAL DERMATITIS: Primary | ICD-10-CM

## 2021-05-27 PROCEDURE — 99204 OFFICE O/P NEW MOD 45 MIN: CPT | Performed by: SURGERY

## 2021-05-27 ASSESSMENT — MIFFLIN-ST. JEOR: SCORE: 1815.92

## 2021-05-27 ASSESSMENT — PAIN SCALES - GENERAL: PAINLEVEL: NO PAIN (0)

## 2021-05-27 NOTE — TELEPHONE ENCOUNTER
Reason for Call:  Form, our goal is to have forms completed with 72 hours, however, some forms may require a visit or additional information.    Type of letter, form or note:  Physician results form    Who is the form from?: Patient    Where did the form come from: Patient or family brought in       What clinic location was the form placed at?: [facility] - [phone]    Where the form was placed:  Box/Folder    What number is listed as a contact on the form?: [phone] ok to [facility]       Additional comments: Patient was seen and had form filled out. Height is wrong on the form should be 70inchs and BMI is incorrect with correct Height.     Form needs to be sent in by the end of May for him to get his rebate from insurance.     Please email form to [email or web address]    Call taken on 5/27/2021 at 12:26 PM by Eva Everett

## 2021-05-27 NOTE — LETTER
2021       RE: Pedrito Negron  [address]  [place] [address]     Dear Colleague,    Thank you for referring your patient, Pedrito Negron, to the [facility] [place] [facility]. Please see a copy of my visit note below.    Colon and Rectal Surgery Clinic Note    RE: Pedrito Negron.  : 1955.  ALEXEI: 2021.    Reason for visit:     HPI:     A mass was recently appreciated by his PCP during recent rectal exam.  He has not noticed the mass or any pain in his rectum or perianal region, but he has noticed bleeding since last summer, and underwent colonoscopy 2020 (normal, see below).      He has had issues with hemorrhoids since the age of 29.  He thinks he has exacerbations every other year or so.  He has not noticed any recent prolapse of tissue or enlargement of the hemorrhoidal tissue recently.    He has notes requiring imodium daily, at least for the past 4 years.   Without this his stool would be liquid.  With the imodium he is able to have a soft-solid stool.  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  He takes 2 mg of imodium and is able to get through his shifts.  He does not take a fiber supplement.  His primary has recommended metamucil.    Medical history:  Past Medical History:   Diagnosis Date     Diverticulitis      Hypertension        Surgical history:  Past Surgical History:   Procedure Laterality Date     COLONOSCOPY WITH CO2 INSUFFLATION N/A 2020    Procedure: COLONOSCOPY, WITH CO2 INSUFFLATION;  Surgeon: Melinda Saleem MD;  Location: [facility] OR     large bowel resection      diverticulitis     STENT  2018    cardiac       Family history:  Family History   Problem Relation Age of Onset     Diabetes Sister    -no CRC or IBD    Medications:  Current Outpatient Medications   Medication Sig Dispense Refill     aspirin (ASA) 81 MG EC tablet Take 81 mg  "by mouth daily       atorvastatin (LIPITOR) 80 MG tablet Take 1 tablet (80 mg) by mouth daily 90 tablet 3     clopidogrel (PLAVIX) 75 MG tablet TAKE 1 TABLET(75 MG) BY MOUTH DAILY 60 tablet 3     isosorbide mononitrate (IMDUR) 30 MG 24 hr tablet Take 1 tablet (30 mg) by mouth daily 90 tablet 1     lisinopril (ZESTRIL) 30 MG tablet Take 1 tablet (30 mg) by mouth daily 90 tablet 3     metoprolol succinate ER (TOPROL-XL) 25 MG 24 hr tablet Take 1 tablet (25 mg) by mouth daily 90 tablet 3     Multiple Vitamin (MULTI-VITAMINS) TABS Take 1 tablet by mouth daily       nitroGLYcerin (NITROSTAT) 0.4 MG sublingual tablet ONE TABLET UNDER TONGUE AS NEEDED FOR CHEST PAIN- IF NO RELIEF AFTER 5 MINUTES CALL 911; USE 1 TABLET EVERY 5 MINUTES- MAX OF 3 TABLETS 25 tablet 0     omeprazole (PRILOSEC) 40 MG DR capsule Take 1 capsule (40 mg) by mouth daily 90 capsule 1     tamsulosin (FLOMAX) 0.4 MG capsule Take 1 capsule (0.4 mg) by mouth daily 90 capsule 1     alclomethasone (ACLOVATE) 0.05 % external cream Apply topically 2 times daily (Patient not taking: Reported on 2021) 45 g 0     ezetimibe (ZETIA) 10 MG tablet Take 1 tablet (10 mg) by mouth daily 90 tablet 3       Allergies:  No Known Allergies    Social history:   Social History     Tobacco Use     Smoking status: Former Smoker     Types: Cigarettes     Quit date:      Years since quittin.4     Smokeless tobacco: Never Used   Substance Use Topics     Alcohol use: Not Currently     Marital status: single.    ROS:  A complete review of systems was performed with the patient and all systems negative except as per HPI.    Physical Examination:  Exam was chaperoned by Qiana Campbell MA    /73   Pulse 100   Ht 5' 10\"   Wt 227 lb   SpO2 96%   BMI 32.57 kg/m    General: Well hydrated. No acute distress.  Abdomen: Soft, not distended  Perianal external examination:  Perianal skin: abnormal - the entire area was moist with clear mucus film, there was an " excoriated lesion extending from his anal verge posteriorly along the remainder of the gluteal cleft and looked very painful, there was no bleeding, no masses, no abscess or areas of fluctuance, no suggestion of fistula.  Lesions: No.  Eversion of buttocks: There was not evidence of an anal fissure. Details: N/A.  Skin tags or external hemorrhoids: No.  Digital rectal examination: Was performed.   Sphincter tone: Good.  Palpable lesions: No.  Other: None.  Bimanual examination: was not performed.    Anoscopy: Was performed.   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       Imaging personally reviewed by me:  Colonoscopy 9/2020   Impression:               - Functional end-to-end colo-rectal anastomosis,                             characterized by healthy appearing mucosa.                             - Diverticulosis in the sigmoid colon and in the                             descending colon.                             - One 8 mm polyp in the ascending colon, removed                             with a hot snare. Resected and retrieved.                             - Small internal hermorrhoids.   Recommendation:           - Repeat colonoscopy date to be determined after                             pending pathology results are reviewed for                             surveillance.                             - Discharge patient to home.                              - No source for rectal bleeding was noted.                             Diverticular or hemorrhoid related are possible.                             Consider upper endocopy, capsule if anemia is                             persistent.     ASSESSMENT  64 y/o gentleman with fecal seepage, perianal dermatitis, and hypertrophied anal papilla.    Risks, benefits, and alternatives of operative treatment were thoroughly discussed with the patient, he/she understands these well and agrees to proceed.    PLAN  1. Fiber supplementation - I counseled him this may help him to no longer require the imodium and that the imodium may make him constipated once fiber has been added  2. Calmoseptine  3. RTC 4-6 weeks to check on healing of perianal region  4. I counseled him that the mass identified by his primary provider was likely one of the anal papilla and which are benign findings    45 minutes spent on the date of the encounter doing chart review, history and exam, imaging review, documentation and further activities as noted above.      Venu Silver MD, PhD    Division of Colon and Rectal Surgery  [facility]    Referring Provider:  Vinay Garcia [place]  [address] [place] [address]     Primary Care Provider:  Vinay Garcia

## 2021-05-27 NOTE — NURSING NOTE
"Chief Complaint   Patient presents with     Mass     New patient consult for rectal mass.        Vitals:    05/27/21 0945   BP: 111/73   Pulse: 100   SpO2: 96%   Weight: 103 kg (227 lb)   Height: 1.778 m (5' 10\")       Body mass index is 32.57 kg/m .    Ancelmo Awad LPN      "

## 2021-05-27 NOTE — PATIENT INSTRUCTIONS
1.  Start applying Calmoseptine cream to your anus and the skin around it to protect it and help heal it from the chronic diarrhea.  2. Start taking a spoonful of metamucil in one glass of water each day (anytime of day).  Make sure to drink at least 8 glasses of water throughout the day.  3. Follow up with me in clinic in 4-6 weeks.

## 2021-05-27 NOTE — PROGRESS NOTES
Colon and Rectal Surgery Clinic Note    RE: Pedrito Negron.  : 1955.  ALEXEI: 2021.    Reason for visit:     HPI:     A mass was recently appreciated by his PCP during recent rectal exam.  He has not noticed the mass or any pain in his rectum or perianal region, but he has noticed bleeding since last summer, and underwent colonoscopy 2020 (normal, see below).      He has had issues with hemorrhoids since the age of 29.  He thinks he has exacerbations every other year or so.  He has not noticed any recent prolapse of tissue or enlargement of the hemorrhoidal tissue recently.    He has notes requiring imodium daily, at least for the past 4 years.   Without this his stool would be liquid.  With the imodium he is able to have a soft-solid stool.  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  He takes 2 mg of imodium and is able to get through his shifts.  He does not take a fiber supplement.  His primary has recommended metamucil.    Medical history:  Past Medical History:   Diagnosis Date     Diverticulitis      Hypertension        Surgical history:  Past Surgical History:   Procedure Laterality Date     COLONOSCOPY WITH CO2 INSUFFLATION N/A 2020    Procedure: COLONOSCOPY, WITH CO2 INSUFFLATION;  Surgeon: Melinda Saleem MD;  Location: [facility] OR     large bowel resection      diverticulitis     STENT  2018    cardiac       Family history:  Family History   Problem Relation Age of Onset     Diabetes Sister    -no CRC or IBD    Medications:  Current Outpatient Medications   Medication Sig Dispense Refill     aspirin (ASA) 81 MG EC tablet Take 81 mg by mouth daily       atorvastatin (LIPITOR) 80 MG tablet Take 1 tablet (80 mg) by mouth daily 90 tablet 3     clopidogrel (PLAVIX) 75 MG tablet TAKE 1 TABLET(75 MG) BY MOUTH DAILY 60 tablet 3     isosorbide mononitrate (IMDUR) 30 MG 24 hr tablet Take 1 tablet (30 mg) by mouth daily 90 tablet 1     lisinopril (ZESTRIL) 30 MG  "tablet Take 1 tablet (30 mg) by mouth daily 90 tablet 3     metoprolol succinate ER (TOPROL-XL) 25 MG 24 hr tablet Take 1 tablet (25 mg) by mouth daily 90 tablet 3     Multiple Vitamin (MULTI-VITAMINS) TABS Take 1 tablet by mouth daily       nitroGLYcerin (NITROSTAT) 0.4 MG sublingual tablet ONE TABLET UNDER TONGUE AS NEEDED FOR CHEST PAIN- IF NO RELIEF AFTER 5 MINUTES CALL 911; USE 1 TABLET EVERY 5 MINUTES- MAX OF 3 TABLETS 25 tablet 0     omeprazole (PRILOSEC) 40 MG DR capsule Take 1 capsule (40 mg) by mouth daily 90 capsule 1     tamsulosin (FLOMAX) 0.4 MG capsule Take 1 capsule (0.4 mg) by mouth daily 90 capsule 1     alclomethasone (ACLOVATE) 0.05 % external cream Apply topically 2 times daily (Patient not taking: Reported on 2021) 45 g 0     ezetimibe (ZETIA) 10 MG tablet Take 1 tablet (10 mg) by mouth daily 90 tablet 3       Allergies:  No Known Allergies    Social history:   Social History     Tobacco Use     Smoking status: Former Smoker     Types: Cigarettes     Quit date:      Years since quittin.4     Smokeless tobacco: Never Used   Substance Use Topics     Alcohol use: Not Currently     Marital status: single.    ROS:  A complete review of systems was performed with the patient and all systems negative except as per HPI.    Physical Examination:  Exam was chaperoned by Qiana Campbell MA    /73   Pulse 100   Ht 5' 10\"   Wt 227 lb   SpO2 96%   BMI 32.57 kg/m    General: Well hydrated. No acute distress.  Abdomen: Soft, not distended  Perianal external examination:  Perianal skin: abnormal - the entire area was moist with clear mucus film, there was an excoriated lesion extending from his anal verge posteriorly along the remainder of the gluteal cleft and looked very painful, there was no bleeding, no masses, no abscess or areas of fluctuance, no suggestion of fistula.  Lesions: No.  Eversion of buttocks: There was not evidence of an anal fissure. Details: N/A.  Skin tags or " external hemorrhoids: No.  Digital rectal examination: Was performed.   Sphincter tone: Good.  Palpable lesions: No.  Other: None.  Bimanual examination: was not performed.    Anoscopy: Was performed.   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       Imaging personally reviewed by me:  Colonoscopy 9/2020   Impression:               - Functional end-to-end colo-rectal anastomosis,                             characterized by healthy appearing mucosa.                             - Diverticulosis in the sigmoid colon and in the                             descending colon.                             - One 8 mm polyp in the ascending colon, removed                             with a hot snare. Resected and retrieved.                             - Small internal hermorrhoids.   Recommendation:           - Repeat colonoscopy date to be determined after                             pending pathology results are reviewed for                             surveillance.                             - Discharge patient to home.                             - No source for rectal bleeding was noted.                             Diverticular or hemorrhoid related are possible.                             Consider upper endocopy, capsule if anemia is                             persistent.     ASSESSMENT  66 y/o gentleman with fecal seepage, perianal dermatitis, and  hypertrophied anal papilla.    Risks, benefits, and alternatives of operative treatment were thoroughly discussed with the patient, he/she understands these well and agrees to proceed.    PLAN  1. Fiber supplementation - I counseled him this may help him to no longer require the imodium and that the imodium may make him constipated once fiber has been added  2. Calmoseptine  3. RTC 4-6 weeks to check on healing of perianal region  4. I counseled him that the mass identified by his primary provider was likely one of the anal papilla and which are benign findings    45 minutes spent on the date of the encounter doing chart review, history and exam, imaging review, documentation and further activities as noted above.      Venu Silver MD, PhD    Division of Colon and Rectal Surgery  [facility]    Referring Provider:  Vinay Garcia [place]  [address] [place] [address]     Primary Care Provider:  Vinay Garcia

## 2021-05-27 NOTE — TELEPHONE ENCOUNTER
"Huddled with SA, review of chart shows heights of 5'9\" to 5'10\" most often. New form completed, signed and faxed   "

## 2021-06-23 DIAGNOSIS — I21.3 ST ELEVATION MYOCARDIAL INFARCTION (STEMI), UNSPECIFIED ARTERY (H): ICD-10-CM

## 2021-06-23 NOTE — TELEPHONE ENCOUNTER
Pending Prescriptions:                       Disp   Refills    clopidogrel (PLAVIX) 75 MG tablet [Pharmac*60 tab*3        Sig: TAKE 1 TABLET(75 MG) BY MOUTH DAILY      Routing refill request to provider for review/approval because:  ppi on file    Hien Clement RN on 6/23/2021 at 4:56 PM

## 2021-06-25 RX ORDER — CLOPIDOGREL BISULFATE 75 MG/1
TABLET ORAL
Qty: 60 TABLET | Refills: 3 | Status: SHIPPED | OUTPATIENT
Start: 2021-06-25 | End: 2022-03-08

## 2021-08-23 DIAGNOSIS — I10 ESSENTIAL HYPERTENSION: ICD-10-CM

## 2021-08-24 RX ORDER — LISINOPRIL 30 MG/1
TABLET ORAL
Qty: 90 TABLET | Refills: 1 | Status: SHIPPED | OUTPATIENT
Start: 2021-08-24 | End: 2022-01-25

## 2021-09-14 NOTE — PROGRESS NOTES
Assessment & Plan     Left-sided low back pain with left-sided sciatica, unspecified chronicity  Pedrito is a 65 year old male with concerns for low back pain that intermittently radiates down his left leg. At times it is debilitating. He denies injury, but does do a lot of bending at work.  He does have sciatic symptoms, however his examination was reassuring.  Negative straight leg raise, initially described pain of the lateral posterior hip, however hip exam was normal.  - XR Lumbar Spine 2/3 Views    Tremor  He endorses a tremor when using his fine motor skills in his hands, he has noticed this for a few years but it has worsened recently and causes issues with tasks. We discussed follow up with neurology.   - Adult Neurology Referral; Future    Need for prophylactic vaccination and inoculation against influenza  He is due for his influenza vaccine    Need for vaccination  He is due for his zoster and pneumococcal vaccines.    Hallux valgus, acquired, unspecified laterality  He has been noticing his toes turning in recently, this causes discomfort, he has been wearing toe spacers while at work, but would like a referral to podiatry.  - Orthopedic  Referral; Future       See Patient Instructions    Return in about 4 weeks (around 10/14/2021) for Follow Up from Today's Encounter.    LOBITO PaulN, RN, FNP-S  [facility]    Vinay Garcia MD  [facility] SHAMEKA Major is a 65 year old who presents for the following health issues:    Left hip pain, bilateral toe discomfort and movement, and tremors in bilateral hands that worsen with fine motor use.      The symptoms are aggravated by movement.        Answers for HPI/ROS submitted by the patient on 9/15/2021  Incident occurred: more than 1 week ago  Incident location: other  Injury mechanism: unknown  Pain location: left hip  Pain - numeric: 5/10  Pain course: constant  tingling: Yes  Foreign body present:  "unknown          Review of Systems   Constitutional, HEENT, cardiovascular, pulmonary, gi and gu systems are negative, except as otherwise noted.      Objective    /68   Pulse 71   Temp 97.4  F (36.3  C) (Temporal)   Resp 16   Ht 1.778 m (5' 10\")   Wt 103 kg (227 lb)   SpO2 95%   BMI 32.57 kg/m    Body mass index is 32.57 kg/m .  Physical Exam   GENERAL: healthy, alert and no distress  RESP: lungs clear to auscultation - no rales, rhonchi or wheezes  CV: regular rate and rhythm, normal S1 S2, no S3 or S4, no murmur, click or rub, no peripheral edema and peripheral pulses strong  ABDOMEN: soft, nontender, no hepatosplenomegaly, no masses and bowel sounds normal  MS: RUE exam shows hand tremors and LUE exam shows hand tremors  BACK: no CVA tenderness, no paralumbar tenderness    Results for orders placed or performed in visit on 09/16/21 (from the past 24 hour(s))   XR Lumbar Spine 2/3 Views    Narrative    LUMBAR SPINE TWO TO THREE VIEWS   9/16/2021 3:09 PM     HISTORY: Left-sided low back pain with left-sided sciatica,  unspecified chronicity.    COMPARISON: None.      Impression    IMPRESSION: No fractures identified. Moderate degenerative disc  disease at L4-5 and L5-S1. Background of mild degenerative disc  disease. Moderate lower lumbar spine facet arthropathy. Slight  dextroconvex curvature. Scattered vascular calcifications. Sacrum is  partially obscured by bowel gas.        Patient seen and examined with nurse practitioner student.  Agree with note above.  Patient will follow-up after meeting with physical therapist, podiatrist, neurologist.    Vinay Garcia MD, FAAFP  Family Medicine Physician  [facility]  [id number] [place] [id number]              "

## 2021-09-15 ASSESSMENT — ENCOUNTER SYMPTOMS: TINGLING: 1

## 2021-09-16 ENCOUNTER — ANCILLARY PROCEDURE (OUTPATIENT)
Dept: GENERAL RADIOLOGY | Facility: CLINIC | Age: 66
End: 2021-09-16
Attending: FAMILY MEDICINE
Payer: COMMERCIAL

## 2021-09-16 ENCOUNTER — OFFICE VISIT (OUTPATIENT)
Dept: FAMILY MEDICINE | Facility: CLINIC | Age: 66
End: 2021-09-16
Payer: COMMERCIAL

## 2021-09-16 VITALS
DIASTOLIC BLOOD PRESSURE: 68 MMHG | OXYGEN SATURATION: 95 % | WEIGHT: 227 LBS | SYSTOLIC BLOOD PRESSURE: 124 MMHG | TEMPERATURE: 97.4 F | BODY MASS INDEX: 32.5 KG/M2 | RESPIRATION RATE: 16 BRPM | HEIGHT: 70 IN | HEART RATE: 71 BPM

## 2021-09-16 DIAGNOSIS — M20.10 HALLUX VALGUS, ACQUIRED, UNSPECIFIED LATERALITY: ICD-10-CM

## 2021-09-16 DIAGNOSIS — Z23 NEED FOR PROPHYLACTIC VACCINATION AND INOCULATION AGAINST INFLUENZA: ICD-10-CM

## 2021-09-16 DIAGNOSIS — R25.1 TREMOR: ICD-10-CM

## 2021-09-16 DIAGNOSIS — Z23 NEED FOR VACCINATION: ICD-10-CM

## 2021-09-16 DIAGNOSIS — M54.42 LEFT-SIDED LOW BACK PAIN WITH LEFT-SIDED SCIATICA, UNSPECIFIED CHRONICITY: Primary | ICD-10-CM

## 2021-09-16 PROCEDURE — 90472 IMMUNIZATION ADMIN EACH ADD: CPT | Performed by: FAMILY MEDICINE

## 2021-09-16 PROCEDURE — 90715 TDAP VACCINE 7 YRS/> IM: CPT | Performed by: FAMILY MEDICINE

## 2021-09-16 PROCEDURE — 72100 X-RAY EXAM L-S SPINE 2/3 VWS: CPT | Performed by: RADIOLOGY

## 2021-09-16 PROCEDURE — 90471 IMMUNIZATION ADMIN: CPT | Performed by: FAMILY MEDICINE

## 2021-09-16 PROCEDURE — 99214 OFFICE O/P EST MOD 30 MIN: CPT | Mod: 25 | Performed by: FAMILY MEDICINE

## 2021-09-16 PROCEDURE — 90732 PPSV23 VACC 2 YRS+ SUBQ/IM: CPT | Performed by: FAMILY MEDICINE

## 2021-09-16 PROCEDURE — 90662 IIV NO PRSV INCREASED AG IM: CPT | Performed by: FAMILY MEDICINE

## 2021-09-16 ASSESSMENT — PAIN SCALES - GENERAL: PAINLEVEL: MODERATE PAIN (5)

## 2021-09-16 ASSESSMENT — MIFFLIN-ST. JEOR: SCORE: 1815.92

## 2021-09-16 NOTE — RESULT ENCOUNTER NOTE
Pedrito,  Your recent x-ray does show some arthritis in that area we talked about.  Please continue with plan to conduct physical therapy and follow-up with me in 4 to 8 weeks if no improvement.  Please let me know if you have any questions or concerns and follow up as discussed in clinic.    Sincerely.  Dr. SAIDA Garcia MD, FAAFP  Family Medicine Physician  [facility]  [id number] [place] [id number]

## 2021-09-16 NOTE — NURSING NOTE
Prior to immunization administration, verified patients identity using patient s name and date of birth. Please see Immunization Activity for additional information.     Screening Questionnaire for Adult Immunization    Are you sick today?   No   Do you have allergies to medications, food, a vaccine component or latex?   No   Have you ever had a serious reaction after receiving a vaccination?   No   Do you have a long-term health problem with heart, lung, kidney, or metabolic disease (e.g., diabetes), asthma, a blood disorder, no spleen, complement component deficiency, a cochlear implant, or a spinal fluid leak?  Are you on long-term aspirin therapy?   No   Do you have cancer, leukemia, HIV/AIDS, or any other immune system problem?   No   Do you have a parent, brother, or sister with an immune system problem?   No   In the past 3 months, have you taken medications that affect  your immune system, such as prednisone, other steroids, or anticancer drugs; drugs for the treatment of rheumatoid arthritis, Crohn s disease, or psoriasis; or have you had radiation treatments?   No   Have you had a seizure, or a brain or other nervous system problem?   No   During the past year, have you received a transfusion of blood or blood    products, or been given immune (gamma) globulin or antiviral drug?   No   For women: Are you pregnant or is there a chance you could become       pregnant during the next month?   No   Have you received any vaccinations in the past 4 weeks?   No     Immunization questionnaire answers were all negative.        Per orders of Dr. Garcia, injection of Pneum 23m, Tdap, and HD flu  given by Evaristo Penny MA. Patient instructed to remain in clinic for 15 minutes afterwards, and to report any adverse reaction to me immediately.       Screening performed by Evaristo Penny MA on 9/16/2021 at 3:03 PM.

## 2021-10-21 DIAGNOSIS — E78.5 HYPERLIPIDEMIA LDL GOAL <100: ICD-10-CM

## 2021-10-21 DIAGNOSIS — I10 ESSENTIAL HYPERTENSION: ICD-10-CM

## 2021-10-21 RX ORDER — ATORVASTATIN CALCIUM 80 MG/1
TABLET, FILM COATED ORAL
Qty: 90 TABLET | Refills: 1 | Status: SHIPPED | OUTPATIENT
Start: 2021-10-21 | End: 2022-04-21

## 2021-10-21 NOTE — TELEPHONE ENCOUNTER
Prescription approved per [facility] Refill Protocol.  Hien Clement RN on 10/21/2021 at 1:05 PM

## 2021-12-22 ENCOUNTER — MYC REFILL (OUTPATIENT)
Dept: CARDIOLOGY | Facility: CLINIC | Age: 66
End: 2021-12-22
Payer: COMMERCIAL

## 2021-12-22 DIAGNOSIS — E78.5 HYPERLIPIDEMIA LDL GOAL <100: ICD-10-CM

## 2021-12-22 DIAGNOSIS — I20.0 UNSTABLE ANGINA (H): ICD-10-CM

## 2021-12-27 RX ORDER — EZETIMIBE 10 MG/1
10 TABLET ORAL DAILY
Qty: 90 TABLET | Refills: 0 | Status: SHIPPED | OUTPATIENT
Start: 2021-12-27 | End: 2022-03-25

## 2021-12-27 RX ORDER — ISOSORBIDE MONONITRATE 30 MG/1
30 TABLET, EXTENDED RELEASE ORAL DAILY
Qty: 90 TABLET | Refills: 0 | Status: SHIPPED | OUTPATIENT
Start: 2021-12-27 | End: 2022-03-25

## 2021-12-27 RX ORDER — METOPROLOL SUCCINATE 25 MG/1
25 TABLET, EXTENDED RELEASE ORAL DAILY
Qty: 90 TABLET | Refills: 0 | Status: SHIPPED | OUTPATIENT
Start: 2021-12-27 | End: 2022-03-21

## 2021-12-28 NOTE — TELEPHONE ENCOUNTER
Called patient to assist in scheduling an appointment with Dr. Shields for a follow up. Advised patient that Dr. Shields's next available is 2/25/2022. Patient accepted an appointment on that date at 2:30 pm, however, he would like to be placed on a wait list, should something sooner open up.     Rachael Brown CMA

## 2021-12-30 ENCOUNTER — OFFICE VISIT (OUTPATIENT)
Dept: NEUROLOGY | Facility: CLINIC | Age: 66
End: 2021-12-30
Attending: FAMILY MEDICINE
Payer: COMMERCIAL

## 2021-12-30 VITALS
HEART RATE: 108 BPM | BODY MASS INDEX: 31.57 KG/M2 | WEIGHT: 220 LBS | DIASTOLIC BLOOD PRESSURE: 85 MMHG | SYSTOLIC BLOOD PRESSURE: 135 MMHG

## 2021-12-30 DIAGNOSIS — M79.662 PAIN OF LEFT LOWER LEG: Primary | ICD-10-CM

## 2021-12-30 DIAGNOSIS — G25.0 ESSENTIAL TREMOR: ICD-10-CM

## 2021-12-30 PROCEDURE — 99203 OFFICE O/P NEW LOW 30 MIN: CPT | Performed by: STUDENT IN AN ORGANIZED HEALTH CARE EDUCATION/TRAINING PROGRAM

## 2021-12-30 RX ORDER — GABAPENTIN 300 MG/1
300 CAPSULE ORAL 3 TIMES DAILY
Qty: 90 CAPSULE | Refills: 4 | Status: SHIPPED | OUTPATIENT
Start: 2021-12-30 | End: 2022-04-21

## 2021-12-30 ASSESSMENT — PATIENT HEALTH QUESTIONNAIRE - PHQ9: SUM OF ALL RESPONSES TO PHQ QUESTIONS 1-9: 3

## 2021-12-30 ASSESSMENT — PAIN SCALES - GENERAL: PAINLEVEL: MODERATE PAIN (4)

## 2021-12-30 NOTE — PATIENT INSTRUCTIONS
Long term you can consider going higher on the metoprolol that is a good medicine for this    Start gabapentin 300 mg nightly. After about one week, take 300mg twice daily. After another week, take 300mg three times daily for a total of 900mg per day.  A common side effect of gabapentin is fatigue, which is why we advise patients to start taking the medication at nighttime. The side effect of fatigue should resolve with time.  Other possible side effects include swelling, and less likely diarrhea.  It is commonly a well tolerated medicine.    Lets see you back this spring.

## 2021-12-30 NOTE — PROGRESS NOTES
"[facility]  Section of General Neurology  New Patient Visit      Pedrito Negron MRN# [id number]   Age: 66 year old YOB: 1955     Requesting physician: Vinay Denton     Reason for Consultation: Tremor        History of Presenting Symptoms:   Pedrito Negron is a 66 year old male who presents today for evaluation of tremor.  He has a history of hypertension among other PMH as below      Tremor specific questions:  Where on body (including head/neck/speaking?) is tremor: in both hands L>R hand.  It is a \"nuisance\" Doing fine tasks make it harder. He noted this first 15-20 years ago.  Holding a coffee cup e.g. very hard to do.   Things that make tremor better (food/drink, situations e.g.)  --etoh doesn't make it better  Thing that make it worse (caffeine e.g etc typically more true of enhanced physiologic tremor):  1 cup a day in the AM, no real change. Worse when BP is high  Age it started: 15-20 years ago  Any change over time?: worsening slightly  Symptoms of thyroid disease? (weight loss, heat intolerance)  Family history of PD, essential tremor or enhanced physiologic tremor?--moms side has similar tremor including 1 brother and 1 sister among other relatively.    Difficulty with any specific tasks? (hand writing, pouring water between 2 cups)--yes holding coffee e.g. other fine tasks  Which hand is dominant hand? RH  Any parkinsons type of symptoms: none, as below  Bradykinesia: none  Falls/postural instability: none  Acting out dreams: none  Rigidity: none  Thyroid history: no issues    He notes left hip pain as well, seems to radiate and is intermittent.  He was thinking of trying PT in this regard which I think is camilo.    He is a [employer] .   He enjoys golfing.   He lives in [place].    He does take metoprolol succ 25 mg daily of note, managed by cardiology    Of note, he technically meets criteria for obesity but not morbid obesity as noted in " his problem list previously.          Past Medical History:     Patient Active Problem List   Diagnosis     Coronary atherosclerosis     H/O acute myocardial infarction     H/O diverticulitis of colon     H/O ETOH abuse     STEMI (ST elevation myocardial infarction) (H)     Hyperlipidemia LDL goal <100     Essential hypertension     Morbid obesity (H)     Past Medical History:   Diagnosis Date     Diverticulitis      Hypertension         Past Surgical History:     Past Surgical History:   Procedure Laterality Date     COLONOSCOPY WITH CO2 INSUFFLATION N/A 2020    Procedure: COLONOSCOPY, WITH CO2 INSUFFLATION;  Surgeon: Melinda Saleem MD;  Location: [facility] OR     large bowel resection      diverticulitis     STENT  2018    cardiac        Social History:     Social History     Tobacco Use     Smoking status: Former Smoker     Types: Cigarettes     Quit date:      Years since quittin.0     Smokeless tobacco: Never Used   Vaping Use     Vaping Use: Never used   Substance Use Topics     Alcohol use: Yes     Comment: 7 beers per week      Drug use: Never        Family History:     Family History   Problem Relation Age of Onset     Diabetes Sister         Medications:     Current Outpatient Medications   Medication Sig     atorvastatin (LIPITOR) 80 MG tablet TAKE 1 TABLET(80 MG) BY MOUTH DAILY     alclomethasone (ACLOVATE) 0.05 % external cream Apply topically 2 times daily     aspirin (ASA) 81 MG EC tablet Take 81 mg by mouth daily     clopidogrel (PLAVIX) 75 MG tablet TAKE 1 TABLET(75 MG) BY MOUTH DAILY     ezetimibe (ZETIA) 10 MG tablet Take 1 tablet (10 mg) by mouth daily     isosorbide mononitrate (IMDUR) 30 MG 24 hr tablet Take 1 tablet (30 mg) by mouth daily     lisinopril (ZESTRIL) 30 MG tablet TAKE 1 TABLET(30 MG) BY MOUTH DAILY     metoprolol succinate ER (TOPROL-XL) 25 MG 24 hr tablet Take 1 tablet (25 mg) by mouth daily     Multiple Vitamin (MULTI-VITAMINS) TABS Take 1 tablet by mouth daily      nitroGLYcerin (NITROSTAT) 0.4 MG sublingual tablet ONE TABLET UNDER TONGUE AS NEEDED FOR CHEST PAIN- IF NO RELIEF AFTER 5 MINUTES CALL 911; USE 1 TABLET EVERY 5 MINUTES- MAX OF 3 TABLETS     omeprazole (PRILOSEC) 40 MG DR capsule Take 1 capsule (40 mg) by mouth daily     tamsulosin (FLOMAX) 0.4 MG capsule Take 1 capsule (0.4 mg) by mouth daily (Patient not taking: Reported on 9/16/2021)     No current facility-administered medications for this visit.        Allergies:   No Known Allergies     Review of Systems:   As noted above     Physical Exam:   Vitals: /85 (BP Location: Right arm, Patient Position: Sitting, Cuff Size: Adult Regular)   Pulse 108   Wt 99.8 kg (220 lb)   BMI 31.57 kg/m     Lungs: breathing comfortably  Extremities: no edema    Neuro:   General Appearance: No apparent distress, well-nourished, well-groomed, pleasant     Mental Status: Alert and oriented to person, place, and time. Speech fluent and comprehension intact. No dysarthria.     Cranial Nerves:   II: Visual fields: normal  III: Pupils: 3 mm, equal, round, reactive to light   III,IV,VI: Extraocular Movements: intact   VII: Facial strength: intact without asymmetry  VIII: Hearing: intact grossly  IX: Palate: intact   XII: Tongue movement: normal     Motor Exam:   Upper Extremities  Deltoid  Bicep  Tricep  Wrist Extensors  strength Intrinsic Muscles    Right  5  5  5  5 5 5    Left  5  5  5  5 5 5      Lower Extremities  Hip Flexors  Knee Extensors  Knee   Flexors  Dorsi Flexion  Plantar   Flexion    Right  5  5  5  5  5    Left  5  5  5  5  5        Intermittent fine action tremor of b/l upper extremity seen accentuated with fine motor tasks like miming pouring water between cups. Tone is normal throughout.    Sensory: intact to light touch    Coordination: no dysmetria with finger-to-nose bilaterally    Reflexes: biceps, triceps, brachioradialis, patellar 1+, and ankle jerks trace and symmetric.            Data: Pertinent prior  to visit         Laboratory:  Lab Results   Component Value Date    WBC 6.0 09/29/2020     Lab Results   Component Value Date    RBC 4.37 09/29/2020     Lab Results   Component Value Date    HGB 13.2 09/29/2020     Lab Results   Component Value Date    HCT 41.5 09/29/2020     No components found for: MCT  Lab Results   Component Value Date    MCV 95 09/29/2020     Lab Results   Component Value Date    MCH 30.2 09/29/2020     Lab Results   Component Value Date    MCHC 31.8 09/29/2020     Lab Results   Component Value Date    RDW 13.2 09/29/2020     Lab Results   Component Value Date     09/29/2020     Last Comprehensive Metabolic Panel:  Sodium   Date Value Ref Range Status   05/07/2021 139 133 - 144 mmol/L Final     Potassium   Date Value Ref Range Status   05/07/2021 4.7 3.4 - 5.3 mmol/L Final     Chloride   Date Value Ref Range Status   05/07/2021 107 94 - 109 mmol/L Final     Carbon Dioxide   Date Value Ref Range Status   05/07/2021 28 20 - 32 mmol/L Final     Anion Gap   Date Value Ref Range Status   05/07/2021 4 3 - 14 mmol/L Final     Glucose   Date Value Ref Range Status   05/07/2021 89 70 - 99 mg/dL Final     Urea Nitrogen   Date Value Ref Range Status   05/07/2021 17 7 - 30 mg/dL Final     Creatinine   Date Value Ref Range Status   05/07/2021 1.09 0.66 - 1.25 mg/dL Final     GFR Estimate   Date Value Ref Range Status   05/07/2021 70 >60 mL/min/[1.73_m2] Final     Comment:     Non  GFR Calc  Starting 12/18/2018, serum creatinine based estimated GFR (eGFR) will be   calculated using the Chronic Kidney Disease Epidemiology Collaboration   (CKD-EPI) equation.       Calcium   Date Value Ref Range Status   05/07/2021 9.7 8.5 - 10.1 mg/dL Final       TSH   Date Value Ref Range Status   08/19/2020 1.98 0.40 - 4.00 mU/L Final              Assessment and Plan:   Assessment:  Mr. Pedrito Negron is a very pleasant 66 year old man who presents in evaluation of tremor.  It is a similar tremor to many  family members emanating from this mother's side of the family.  It has been ongoing for many years but worsening to some degree.  He has no signs of Parkinsonism and this to my estimation is a classic essential tremor.  Discussed common treatments including beta blockers (already on) and gabapentin.  I would recommend gabapentin to help both this tremor and some left hip pain that is present intermittently.      Plan:  --Gabapentin 300 mg at bedtime and to titrate up to 300 mg TID  --Encouraged him to discuss going up on metoprolol as well if tremor still bothersome at next cardiology appointment.  --PT script given to try exercises to help with L hip pain  --Follow up with me in 4 months can call or mychart sooner with questions or issues if need be.              Bobby Daniels MD   of Neurology   [facility]

## 2021-12-30 NOTE — LETTER
"    12/30/2021         RE: Pedrito Negron  [address]  [facility] [address]        Dear Colleague,    Thank you for referring your patient, Pedrito Negron, to the [facility] [place]. Please see a copy of my visit note below.    [facility]  Section of General Neurology  New Patient Visit      Pedrito Negron MRN# [id number]   Age: 66 year old YOB: 1955     Requesting physician: Vinay Denton     Reason for Consultation: Tremor        History of Presenting Symptoms:   Pedrito Negron is a 66 year old male who presents today for evaluation of tremor.  He has a history of hypertension among other PMH as below      Tremor specific questions:  Where on body (including head/neck/speaking?) is tremor: in both hands L>R hand.  It is a \"nuisance\" Doing fine tasks make it harder. He noted this first 15-20 years ago.  Holding a coffee cup e.g. very hard to do.   Things that make tremor better (food/drink, situations e.g.)  --etoh doesn't make it better  Thing that make it worse (caffeine e.g etc typically more true of enhanced physiologic tremor):  1 cup a day in the AM, no real change. Worse when BP is high  Age it started: 15-20 years ago  Any change over time?: worsening slightly  Symptoms of thyroid disease? (weight loss, heat intolerance)  Family history of PD, essential tremor or enhanced physiologic tremor?--moms side has similar tremor including 1 brother and 1 sister among other relatively.    Difficulty with any specific tasks? (hand writing, pouring water between 2 cups)--yes holding coffee e.g. other fine tasks  Which hand is dominant hand? RH  Any parkinsons type of symptoms: none, as below  Bradykinesia: none  Falls/postural instability: none  Acting out dreams: none  Rigidity: none  Thyroid history: no issues    He notes left hip pain as well, seems to radiate and is intermittent.  He was thinking of trying PT in this regard " which I think is camilo.    He is a [employer] .   He enjoys golfing.   He lives in [place].    He does take metoprolol succ 25 mg daily of note, managed by cardiology    Of note, he technically meets criteria for obesity but not morbid obesity as noted in his problem list previously.          Past Medical History:     Patient Active Problem List   Diagnosis     Coronary atherosclerosis     H/O acute myocardial infarction     H/O diverticulitis of colon     H/O ETOH abuse     STEMI (ST elevation myocardial infarction) (H)     Hyperlipidemia LDL goal <100     Essential hypertension     Morbid obesity (H)     Past Medical History:   Diagnosis Date     Diverticulitis      Hypertension         Past Surgical History:     Past Surgical History:   Procedure Laterality Date     COLONOSCOPY WITH CO2 INSUFFLATION N/A 2020    Procedure: COLONOSCOPY, WITH CO2 INSUFFLATION;  Surgeon: Melinda Saleem MD;  Location: [facility] OR     large bowel resection      diverticulitis     STENT      cardiac        Social History:     Social History     Tobacco Use     Smoking status: Former Smoker     Types: Cigarettes     Quit date:      Years since quittin.0     Smokeless tobacco: Never Used   Vaping Use     Vaping Use: Never used   Substance Use Topics     Alcohol use: Yes     Comment: 7 beers per week      Drug use: Never        Family History:     Family History   Problem Relation Age of Onset     Diabetes Sister         Medications:     Current Outpatient Medications   Medication Sig     atorvastatin (LIPITOR) 80 MG tablet TAKE 1 TABLET(80 MG) BY MOUTH DAILY     alclomethasone (ACLOVATE) 0.05 % external cream Apply topically 2 times daily     aspirin (ASA) 81 MG EC tablet Take 81 mg by mouth daily     clopidogrel (PLAVIX) 75 MG tablet TAKE 1 TABLET(75 MG) BY MOUTH DAILY     ezetimibe (ZETIA) 10 MG tablet Take 1 tablet (10 mg) by mouth daily     isosorbide mononitrate (IMDUR) 30 MG 24 hr tablet Take 1 tablet (30  mg) by mouth daily     lisinopril (ZESTRIL) 30 MG tablet TAKE 1 TABLET(30 MG) BY MOUTH DAILY     metoprolol succinate ER (TOPROL-XL) 25 MG 24 hr tablet Take 1 tablet (25 mg) by mouth daily     Multiple Vitamin (MULTI-VITAMINS) TABS Take 1 tablet by mouth daily     nitroGLYcerin (NITROSTAT) 0.4 MG sublingual tablet ONE TABLET UNDER TONGUE AS NEEDED FOR CHEST PAIN- IF NO RELIEF AFTER 5 MINUTES CALL 911; USE 1 TABLET EVERY 5 MINUTES- MAX OF 3 TABLETS     omeprazole (PRILOSEC) 40 MG DR capsule Take 1 capsule (40 mg) by mouth daily     tamsulosin (FLOMAX) 0.4 MG capsule Take 1 capsule (0.4 mg) by mouth daily (Patient not taking: Reported on 9/16/2021)     No current facility-administered medications for this visit.        Allergies:   No Known Allergies     Review of Systems:   As noted above     Physical Exam:   Vitals: /85 (BP Location: Right arm, Patient Position: Sitting, Cuff Size: Adult Regular)   Pulse 108   Wt 99.8 kg (220 lb)   BMI 31.57 kg/m     Lungs: breathing comfortably  Extremities: no edema    Neuro:   General Appearance: No apparent distress, well-nourished, well-groomed, pleasant     Mental Status: Alert and oriented to person, place, and time. Speech fluent and comprehension intact. No dysarthria.     Cranial Nerves:   II: Visual fields: normal  III: Pupils: 3 mm, equal, round, reactive to light   III,IV,VI: Extraocular Movements: intact   VII: Facial strength: intact without asymmetry  VIII: Hearing: intact grossly  IX: Palate: intact   XII: Tongue movement: normal     Motor Exam:   Upper Extremities  Deltoid  Bicep  Tricep  Wrist Extensors  strength Intrinsic Muscles    Right  5  5  5  5 5 5    Left  5  5  5  5 5 5      Lower Extremities  Hip Flexors  Knee Extensors  Knee   Flexors  Dorsi Flexion  Plantar   Flexion    Right  5  5  5  5  5    Left  5  5  5  5  5        Intermittent fine action tremor of b/l upper extremity seen accentuated with fine motor tasks like miming pouring water  between cups. Tone is normal throughout.    Sensory: intact to light touch    Coordination: no dysmetria with finger-to-nose bilaterally    Reflexes: biceps, triceps, brachioradialis, patellar 1+, and ankle jerks trace and symmetric.            Data: Pertinent prior to visit         Laboratory:  Lab Results   Component Value Date    WBC 6.0 09/29/2020     Lab Results   Component Value Date    RBC 4.37 09/29/2020     Lab Results   Component Value Date    HGB 13.2 09/29/2020     Lab Results   Component Value Date    HCT 41.5 09/29/2020     No components found for: MCT  Lab Results   Component Value Date    MCV 95 09/29/2020     Lab Results   Component Value Date    MCH 30.2 09/29/2020     Lab Results   Component Value Date    MCHC 31.8 09/29/2020     Lab Results   Component Value Date    RDW 13.2 09/29/2020     Lab Results   Component Value Date     09/29/2020     Last Comprehensive Metabolic Panel:  Sodium   Date Value Ref Range Status   05/07/2021 139 133 - 144 mmol/L Final     Potassium   Date Value Ref Range Status   05/07/2021 4.7 3.4 - 5.3 mmol/L Final     Chloride   Date Value Ref Range Status   05/07/2021 107 94 - 109 mmol/L Final     Carbon Dioxide   Date Value Ref Range Status   05/07/2021 28 20 - 32 mmol/L Final     Anion Gap   Date Value Ref Range Status   05/07/2021 4 3 - 14 mmol/L Final     Glucose   Date Value Ref Range Status   05/07/2021 89 70 - 99 mg/dL Final     Urea Nitrogen   Date Value Ref Range Status   05/07/2021 17 7 - 30 mg/dL Final     Creatinine   Date Value Ref Range Status   05/07/2021 1.09 0.66 - 1.25 mg/dL Final     GFR Estimate   Date Value Ref Range Status   05/07/2021 70 >60 mL/min/[1.73_m2] Final     Comment:     Non  GFR Calc  Starting 12/18/2018, serum creatinine based estimated GFR (eGFR) will be   calculated using the Chronic Kidney Disease Epidemiology Collaboration   (CKD-EPI) equation.       Calcium   Date Value Ref Range Status   05/07/2021 9.7 8.5 -  10.1 mg/dL Final       TSH   Date Value Ref Range Status   08/19/2020 1.98 0.40 - 4.00 mU/L Final              Assessment and Plan:   Assessment:  Mr. Pedrito Negron is a very pleasant 66 year old man who presents in evaluation of tremor.  It is a similar tremor to many family members emanating from this mother's side of the family.  It has been ongoing for many years but worsening to some degree.  He has no signs of Parkinsonism and this to my estimation is a classic essential tremor.  Discussed common treatments including beta blockers (already on) and gabapentin.  I would recommend gabapentin to help both this tremor and some left hip pain that is present intermittently.      Plan:  --Gabapentin 300 mg at bedtime and to titrate up to 300 mg TID  --Encouraged him to discuss going up on metoprolol as well if tremor still bothersome at next cardiology appointment.  --PT script given to try exercises to help with L hip pain  --Follow up with me in 4 months can call or mychart sooner with questions or issues if need be.              Bobby Daniels MD   of Neurology   [facility]          Again, thank you for allowing me to participate in the care of your patient.        Sincerely,        Juan A Daniels MD

## 2022-01-14 ENCOUNTER — TELEPHONE (OUTPATIENT)
Dept: FAMILY MEDICINE | Facility: CLINIC | Age: 67
End: 2022-01-14
Payer: COMMERCIAL

## 2022-01-14 NOTE — TELEPHONE ENCOUNTER
Provider are you able to see pt in an approval only spot sooner or would you like this triaged prior to seeing in clinic?     Marissa Shields CMA ([facility])

## 2022-01-14 NOTE — TELEPHONE ENCOUNTER
Please take any available slot.  Thank you    Vinay Garcia MD, FAAFP  Family Medicine Physician  [facility] [place]  [id number] [address] [facility], [place] [address]

## 2022-01-14 NOTE — TELEPHONE ENCOUNTER
"Reason for Call:  Same Day Appointment, Requested Provider:  Vinay Garcia MD    PCP: Vinay Garcai    Reason for visit: \"I have a debilitating condition in lower back that I thought would go away but it has not. Please help with any advice you can give me via text. It's not from trauma.\"    Duration of symptoms: unknown    Have you been treated for this in the past? unknown    Additional comments: Patient is currently scheduled on 2/8/22 but is requesting to get worked in sooner or talk to a member of the care team.     Can we leave a detailed message on this number? YES    Phone number patient can be reached at: Home number on file [phone] (home)    Best Time: any    Call taken on 1/14/2022 at 3:49 PM by Sherlyn Ansari        "

## 2022-01-14 NOTE — TELEPHONE ENCOUNTER
Spoke to patient, first available for Dr. Garcia is 1/21. Patient declined, requested to see any provider sooner. Scheduled patient to see Dr. Wheat on 1/18    Next 5 appointments (look out 90 days)    Jan 18, 2022  9:40 AM  (Arrive by 9:20 AM)  Provider Visit with Everette Wheat MD  [facility] ([facility] ) [address] [place] [address], [address]  [facility] [id number] [phone]   Feb 25, 2022  2:30 PM  Return Visit with Dio Shields MD  [facility] ([facility]) [address] [address] [place] [phone]

## 2022-01-14 NOTE — PROGRESS NOTES
"  Assessment & Plan     Osteoarthritis of lumbar spine, unspecified spinal osteoarthritis complication status  Alternate between ice and heat  Scheduled with physical therapy in 2 days  - diclofenac (VOLTAREN) 1 % topical gel; Apply 4 g topically 4 times daily  - tiZANidine (ZANAFLEX) 2 MG tablet; Take 1 tablet (2 mg) by mouth nightly as needed for muscle spasms    Morbid obesity (H)  BMI:   Estimated body mass index is 32.4 kg/m  as calculated from the following:    Height as of this encounter: 1.759 m (5' 9.25\").    Weight as of this encounter: 100.2 kg (221 lb).   Weight management plan: Discussed healthy diet and exercise guidelines    High priority for 2019-nCoV vaccine  - COVID-19,PF,MODERNA (18+ Yrs BOOSTER .25mL)      Return if symptoms worsen or fail to improve.    Everette Wheat MD  [facility] SHAMEKA Major is a 66 year old who presents for the following health issues  accompanied by his self.    HPI     Answers for HPI/ROS submitted by the patient on 1/17/2022  Your back pain is: recurring  Where is your back pain located? : left lower back  How would you describe your back pain? : other  Where does your back pain spread? : nowhere  Since you noticed your back pain, how has it changed? : gradually improving  Does your back pain interfere with your job?: Yes      Needs [facility] paperwork filled out for employer for flare ups ( [employer])    9/16/2021  IMPRESSION: No fractures identified. Moderate degenerative disc  disease at L4-5 and L5-S1. Background of mild degenerative disc  disease. Moderate lower lumbar spine facet arthropathy. Slight  dextroconvex curvature. Scattered vascular calcifications. Sacrum is  partially obscured by bowel gas.     Physical therapy 1/18/2022    Review of Systems   Constitutional, HEENT, cardiovascular, pulmonary, GI, , musculoskeletal, neuro, skin, endocrine and psych systems are negative, except as otherwise noted.      Objective  " "  /78 (BP Location: Left arm, Patient Position: Chair, Cuff Size: Adult Regular)   Pulse 79   Temp 98.4  F (36.9  C) (Temporal)   Resp 19   Ht 1.759 m (5' 9.25\")   Wt 100.2 kg (221 lb)   SpO2 96%   BMI 32.40 kg/m    Body mass index is 32.4 kg/m .  Physical Exam   GENERAL: healthy, alert and no distress  Comprehensive back pain exam:  Tenderness of Left Lumbar nd SI jpoint, sciatic notch, Pain limits the following motions: extension and flexion, Lower extremity strength functional and equal on both sides, Lower extremity reflexes within normal limits bilaterally, Lower extremity sensation normal and equal on both sides and Straight leg positive on  left, indicating possible ipsilateral radiculopathy                "

## 2022-01-18 ENCOUNTER — OFFICE VISIT (OUTPATIENT)
Dept: FAMILY MEDICINE | Facility: CLINIC | Age: 67
End: 2022-01-18
Payer: COMMERCIAL

## 2022-01-18 VITALS
BODY MASS INDEX: 32.73 KG/M2 | SYSTOLIC BLOOD PRESSURE: 124 MMHG | TEMPERATURE: 98.4 F | DIASTOLIC BLOOD PRESSURE: 78 MMHG | RESPIRATION RATE: 19 BRPM | OXYGEN SATURATION: 96 % | WEIGHT: 221 LBS | HEIGHT: 69 IN | HEART RATE: 79 BPM

## 2022-01-18 DIAGNOSIS — M47.816 OSTEOARTHRITIS OF LUMBAR SPINE, UNSPECIFIED SPINAL OSTEOARTHRITIS COMPLICATION STATUS: Primary | ICD-10-CM

## 2022-01-18 DIAGNOSIS — E66.01 MORBID OBESITY (H): ICD-10-CM

## 2022-01-18 DIAGNOSIS — Z23 HIGH PRIORITY FOR 2019-NCOV VACCINE: ICD-10-CM

## 2022-01-18 PROCEDURE — 0064A COVID-19,PF,MODERNA (18+ YRS BOOSTER .25ML): CPT | Performed by: FAMILY MEDICINE

## 2022-01-18 PROCEDURE — 91306 COVID-19,PF,MODERNA (18+ YRS BOOSTER .25ML): CPT | Performed by: FAMILY MEDICINE

## 2022-01-18 PROCEDURE — 99213 OFFICE O/P EST LOW 20 MIN: CPT | Mod: 25 | Performed by: FAMILY MEDICINE

## 2022-01-18 RX ORDER — TIZANIDINE 2 MG/1
2 TABLET ORAL
Qty: 30 TABLET | Refills: 0 | Status: SHIPPED | OUTPATIENT
Start: 2022-01-18 | End: 2022-02-15

## 2022-01-18 ASSESSMENT — PAIN SCALES - GENERAL: PAINLEVEL: MILD PAIN (3)

## 2022-01-18 ASSESSMENT — MIFFLIN-ST. JEOR: SCORE: 1771.79

## 2022-01-18 NOTE — PATIENT INSTRUCTIONS
Patient Education     Degenerative Disk Disease    Spinal disks are gel-filled cushions between the bones, or vertebrae, of the spine. The disks act like shock absorbers. Over time, the disks may break down. This is called degenerative disk disease.  This condition can affect the neck or back. It is one of the most common causes of low back pain. The pain often remains localized to the lower back or neck. Muscle spasm is often present and adds to the pain.  Disk degeneration is a natural part of aging. But it is not painful for most people. It may also occur as a result of repeated minor injuries due to daily activities, sports, or accidents. It may lead to osteoarthritis of the spine. Back pain related to disk disease may come and go. Or it may become chronic and last for months or years. The disk may bulge or rupture. This is called a slipped disk or herniated disk. That can put pressure on a nearby spinal nerve and cause neck or back pain that spreads down one arm or leg.  X-rays, CT scan, or an MRI scan may help to diagnose this condition. For acute pain, treatment includes anti-inflammatory medicines, muscle relaxants, rest, ice, or heat. Strong prescription pain medicines, called opioids, may be needed for short-term treatment if pain suddenly gets worse. Opioid medicines can be addictive. So they are not advised for long-term pain management. Other types of medicines are preferred. Surgery is generally not used to treat this condition unless there is a complication.    Home care    For neck pain: Use a comfortable pillow that supports the head and keeps the spine in a neutral position. Your head should not be tilted forward or backward.    For back pain: Avoid sitting for long periods of time. This puts more stress on the lower back than standing or walking. Starting a regular exercise program to strengthen the supporting muscles of the spine will make it easier to live with degenerative disk  disease.    Apply an ice pack over the injured area for no more than 15 to 20 minutes. Do this every 3 to 6 hours for the first 24 to 48 hours. To make an ice pack, put ice cubes in a plastic bag that seals at the top. Wrap the bag in a clean, thin towel or cloth. Never put ice or an ice pack directly on the skin. Keep using ice packs to ease pain and swelling as needed. After 48 hours, apply heat (warm shower or warm bath) for 20 minutes several times a day, or switch between ice and heat.     You may use over-the-counter pain medicine to control pain, unless another pain medicine was prescribed. If you have chronic liver or kidney disease or ever had a stomach ulcer or GI bleeding, talk with your provider before using these medicines.    Follow-up care  Follow up with your healthcare provider, or as directed.  If X-rays, a CT scan or an MRI scan were done, you will be notified of any new findings that may affect your care.  When to seek medical advice  Contact your healthcare provider right away if any of these occur:    Increasing back pain    Your foot drags when you walk, a condition called foot drop    You have new weakness, numbness, or pain in one or both arms or legs    Loss of bowel or bladder control    Numbness or tingling in the buttock or groin area  [email or web address] last reviewed this educational content on 3/1/2018    8832-9703 [employer]. All rights reserved. This information is not intended as a substitute for professional medical care. Always follow your healthcare professional's instructions.         -USE MUSCLE RELAXANT TIZANADINE 2 MG BEDTIME AS NEEDED  -USE TOPICAL GEL AS NEEDED FOR PAIN  -OK TO BRING FMLA PAPER WHEN AVAILABLE

## 2022-01-20 ENCOUNTER — THERAPY VISIT (OUTPATIENT)
Dept: PHYSICAL THERAPY | Facility: CLINIC | Age: 67
End: 2022-01-20
Payer: COMMERCIAL

## 2022-01-20 DIAGNOSIS — M54.50 CHRONIC LEFT-SIDED LOW BACK PAIN WITHOUT SCIATICA: ICD-10-CM

## 2022-01-20 DIAGNOSIS — G89.29 CHRONIC LEFT-SIDED LOW BACK PAIN WITHOUT SCIATICA: ICD-10-CM

## 2022-01-20 PROCEDURE — 97161 PT EVAL LOW COMPLEX 20 MIN: CPT | Mod: GP | Performed by: PHYSICAL THERAPIST

## 2022-01-20 PROCEDURE — 97110 THERAPEUTIC EXERCISES: CPT | Mod: GP | Performed by: PHYSICAL THERAPIST

## 2022-01-20 NOTE — PROGRESS NOTES
Physical Therapy Initial Evaluation  Subjective:    Patient Health History  Pedrito CREWS Unangst being seen for Lower back condition. General spine problems.     Problem began: 12/30/2021.   Problem occurred: Not trauma   Pain is reported as 2/10 on pain scale.  General health as reported by patient is good.   Other medical history details: heart disease, high blood pressure.     Medical allergies: none.   Surgeries include:  Heart surgery and other. Other surgery history details: 1998 bowel resection.    Current medications:  High blood pressure medication and other. Other medications details: heart.       Primary job tasks include:  Lifting/carrying and prolonged sitting.   Other job/home tasks details: [employer] .                Therapist Generated HPI Evaluation  Problem details: Pt reports low back and left hip pain that has been going on for 25 years in cycles. Every 2 or 3 years gets a pain in his lower back that is debilitating. Just getting out of bed is a 10/10. About 2 weeks ago this happened again. Not sure what started it all. Thinks maybe it was grabbing his back pack to go to work. .         Type of problem:  Lumbar.    This is a recurrent (12/30/21) condition.  Condition occurred with:  Insidious onset.  Where condition occurred: for unknown reasons.  Patient reports pain:  Lumbar spine left and lower lumbar spine.  Pain is described as aching, burning and stabbing and is intermittent.  Pain radiates to:  Gluteals left. Pain is worse in the P.M..  Since onset symptoms are unchanged.  Associated symptoms:  Loss of strength and loss of motion/stiffness. Symptoms are exacerbated by standing and bending (dishes)  and relieved by NSAID's.  Special tests included:  X-ray (moderate degeneration at L4-L5 and L5-S vasbular calcification. ).    Restrictions due to condition include:  Working in normal job without restrictions.  Barriers include:  None as reported by patient.    Patient Health  History  Pedrito ELEONORA Unangst being seen for Lower back condition. General spine problems.     Problem began: 12/30/2021.   Problem occurred: Not trauma   Pain is reported as 2/10 on pain scale.  General health as reported by patient is good.   Other medical history details: heart disease, high blood pressure.     Medical allergies: none.   Surgeries include:  Heart surgery and other. Other surgery history details: 1998 bowel resection.    Current medications:  High blood pressure medication and other. Other medications details: heart.       Primary job tasks include:  Lifting/carrying and prolonged sitting.   Other job/home tasks details: [employer] .                                  Objective:  System         Lumbar/SI Evaluation    Lumbar Myotomes:  normal (able to heel and toe walk)                Lumbar Dermtomes:  normal                Neural Tension/Mobility:      Left side:SLR or Slump  negative.     Right side:   Slump or SLR  negative.   Lumbar Palpation:    Tenderness present at Left:    Quadratus Lumborum and Erector Spinae    Tenderness not present at Right:  Quadratus Lumborum or Erector Spinae    Lumbar Provocation:      Left negative with:  PROM hip    Right negative with:  PROM hip  Spinal Segmental Conclusions:     Level: Hypo noted at L3, L4, L5 and S1                                        Hip Evaluation  HIP AROM:    Flexion: Left: 102   Right:       Abduction: Left: 44    Right:       Internal Rotation: Left: 30   Right:  External Rotation: Left: 42 with pain,    Right:        Hip Strength:    Flexion:   Left: 5/5    Pain: strong/pain free  Right: 5/5    Pain: strong/pain free                    Extension:  Left: 5/5   Pain:strong/pain freeRight: 5/5     Pain: strong/pain free    Abduction:  Left: 5/5      Pain:strong/pain freeRight: /5   Pain:strong/pain free  Adduction:  Left: /5   Pain:strong/pain freeRight: 5/5    Pain:strong/pain free  Internal Rotation:  Left: 5/5     Pain:strong/pain  freeRight: 5/5    Pain:strong/pain free  External Rotation:  Left: /5  Pain: strong/pain free                               Manjula Lumbar Evaluation      Movement Loss:  Flexion (Flex): min  Extension (EXT): mod and pain  Side Sullivan R (SG R): min  Side Glide L (SG L): min  Test Movements:        EIL: During: increases  After: no worse    Repeat EIL: During: increases  After: no worse  Mechanical Response: IncROM        Conclusion: derangement                                         ROS    Assessment/Plan:    Patient is a 66 year old male with lumbar complaints.    Patient has the following significant findings with corresponding treatment plan.                Diagnosis 1:  Chronic re-occurring low back pain without sciatica  Pain -  hot/cold therapy, US, manual therapy, self management, education, directional preference exercise and home program  Decreased ROM/flexibility - manual therapy, therapeutic exercise, therapeutic activity and home program  Decreased joint mobility - manual therapy, therapeutic exercise, therapeutic activity and home program  Decreased function - therapeutic activities and home program  Impaired posture - neuro re-education, therapeutic activities and home program    Therapy Evaluation Codes:   1) History comprised of:   Personal factors that impact the plan of care:      Time since onset of symptoms.    Comorbidity factors that impact the plan of care are:      Heart problems, High blood pressure and Overweight.     Medications impacting care: Cardiac, High blood pressure and Muscle relaxant.  2) Examination of Body Systems comprised of:   Body structures and functions that impact the plan of care:      Lumbar spine.   Activity limitations that impact the plan of care are:      Bending, Sitting and doing dishes.  3) Clinical presentation characteristics are:   Stable/Uncomplicated.  4) Decision-Making    Low complexity using standardized patient assessment instrument and/or measureable  assessment of functional outcome.  Cumulative Therapy Evaluation is: Low complexity.    Previous and current functional limitations:  (See Goal Flow Sheet for this information)    Short term and Long term goals: (See Goal Flow Sheet for this information)     Communication ability:  Patient appears to be able to clearly communicate and understand verbal and written communication and follow directions correctly.  Treatment Explanation - The following has been discussed with the patient:   RX ordered/plan of care  Anticipated outcomes  Possible risks and side effects  This patient would benefit from PT intervention to resume normal activities.   Rehab potential is excellent.    Frequency:  1 X week, once daily  Duration:  for 8 weeks  Discharge Plan:  Achieve all LTG.  Independent in home treatment program.  Reach maximal therapeutic benefit.    Please refer to the daily flowsheet for treatment today, total treatment time and time spent performing 1:1 timed codes.

## 2022-01-21 ENCOUNTER — TELEPHONE (OUTPATIENT)
Dept: FAMILY MEDICINE | Facility: CLINIC | Age: 67
End: 2022-01-21
Payer: COMMERCIAL

## 2022-01-21 NOTE — CONFIDENTIAL NOTE
Reason for Call:  Form, our goal is to have forms completed with 72 hours, however, some forms may require a visit or additional information.    Type of letter, form or note:  FMLA    Who is the form from?: Insurance comp    Where did the form come from: Patient or family brought in       What clinic location was the form placed at?: [facility] [phone]    Where the form was placed:     What number is listed as a contact on the form?: [phone] Attn:  [employer]        Additional comments: Fax form to:  [phone]    Call taken on 1/21/2022 at 1:28 PM by Tiffani Mathur

## 2022-01-24 NOTE — TELEPHONE ENCOUNTER
Form has been placed in providers form bin  Patient states need done by Friday Jan 28th     Thanks  Hien Garcia RT (R)

## 2022-01-25 DIAGNOSIS — I10 ESSENTIAL HYPERTENSION: ICD-10-CM

## 2022-01-25 RX ORDER — LISINOPRIL 30 MG/1
TABLET ORAL
Qty: 90 TABLET | Refills: 1 | Status: SHIPPED | OUTPATIENT
Start: 2022-01-25 | End: 2022-06-17

## 2022-02-04 ENCOUNTER — THERAPY VISIT (OUTPATIENT)
Dept: PHYSICAL THERAPY | Facility: CLINIC | Age: 67
End: 2022-02-04
Payer: COMMERCIAL

## 2022-02-04 DIAGNOSIS — M54.50 CHRONIC LEFT-SIDED LOW BACK PAIN WITHOUT SCIATICA: ICD-10-CM

## 2022-02-04 DIAGNOSIS — G89.29 CHRONIC LEFT-SIDED LOW BACK PAIN WITHOUT SCIATICA: ICD-10-CM

## 2022-02-04 PROCEDURE — 97140 MANUAL THERAPY 1/> REGIONS: CPT | Mod: GP | Performed by: PHYSICAL THERAPIST

## 2022-02-04 PROCEDURE — 97110 THERAPEUTIC EXERCISES: CPT | Mod: GP | Performed by: PHYSICAL THERAPIST

## 2022-02-12 DIAGNOSIS — M47.816 OSTEOARTHRITIS OF LUMBAR SPINE, UNSPECIFIED SPINAL OSTEOARTHRITIS COMPLICATION STATUS: ICD-10-CM

## 2022-02-15 RX ORDER — TIZANIDINE 2 MG/1
TABLET ORAL
Qty: 30 TABLET | Refills: 0 | Status: SHIPPED | OUTPATIENT
Start: 2022-02-15 | End: 2022-09-26

## 2022-03-02 DIAGNOSIS — I21.3 ST ELEVATION MYOCARDIAL INFARCTION (STEMI), UNSPECIFIED ARTERY (H): ICD-10-CM

## 2022-03-03 NOTE — TELEPHONE ENCOUNTER
"Pending Prescriptions:                       Disp   Refills    clopidogrel (PLAVIX) 75 MG tablet [Pharmac*60 tab*3        Sig: TAKE 1 TABLET(75 MG) BY MOUTH DAILY    Routing refill request to provider for review/approval because:  Requested Prescriptions   Pending Prescriptions Disp Refills    clopidogrel (PLAVIX) 75 MG tablet [Pharmacy Med Name: CLOPIDOGREL 75MG TABLETS] 60 tablet 3     Sig: TAKE 1 TABLET(75 MG) BY MOUTH DAILY        Plavix Failed - 3/2/2022  2:19 PM        Failed - Normal HGB on file in past 12 months     Recent Labs   Lab Test 09/29/20  1307   HGB 13.2*                 Failed - Normal Platelets on file in past 12 months       Recent Labs   Lab Test 09/29/20  1307                  Passed - No active PPI on record unless is Protonix        Passed - Recent (12 mo) or future (30 days) visit within the authorizing provider's specialty     Patient has had an office visit with the authorizing provider or a provider within the authorizing providers department within the previous 12 mos or has a future within next 30 days. See \"Patient Info\" tab in inbasket, or \"Choose Columns\" in Meds & Orders section of the refill encounter.              Passed - Medication is active on med list        Passed - Patient is age 18 or older            clopidogrel (PLAVIX) 75 MG tablet 60 tablet 3 6/25/2021  No   Sig: TAKE 1 TABLET(75 MG) BY MOUTH DAILY   Sent to pharmacy as: Clopidogrel Bisulfate 75 MG Oral Tablet (PLAVIX)   Class: E-Prescribe   Order: [id number]   E-Prescribing Status: Receipt confirmed by pharmacy (6/25/2021  8:30 AM CDT)     Nava Kauffman RN on 3/3/2022 at 1:29 PM        "

## 2022-03-04 RX ORDER — CLOPIDOGREL BISULFATE 75 MG/1
TABLET ORAL
Qty: 60 TABLET | Refills: 3 | OUTPATIENT
Start: 2022-03-04

## 2022-03-06 DIAGNOSIS — I21.3 ST ELEVATION MYOCARDIAL INFARCTION (STEMI), UNSPECIFIED ARTERY (H): ICD-10-CM

## 2022-03-07 NOTE — TELEPHONE ENCOUNTER
"Pending Prescriptions:                       Disp   Refills    clopidogrel (PLAVIX) 75 MG tablet [Pharmac*60 tab*3        Sig: TAKE 1 TABLET(75 MG) BY MOUTH DAILY    Routing refill request to provider for review/approval because:  Requested Prescriptions   Pending Prescriptions Disp Refills    clopidogrel (PLAVIX) 75 MG tablet [Pharmacy Med Name: CLOPIDOGREL 75MG TABLETS] 60 tablet 3     Sig: TAKE 1 TABLET(75 MG) BY MOUTH DAILY        Plavix Failed - 3/6/2022 10:15 AM        Failed - Normal HGB on file in past 12 months     Recent Labs   Lab Test 09/29/20  1307   HGB 13.2*                 Failed - Normal Platelets on file in past 12 months       Recent Labs   Lab Test 09/29/20  1307                  Passed - No active PPI on record unless is Protonix        Passed - Recent (12 mo) or future (30 days) visit within the authorizing provider's specialty     Patient has had an office visit with the authorizing provider or a provider within the authorizing providers department within the previous 12 mos or has a future within next 30 days. See \"Patient Info\" tab in inbasket, or \"Choose Columns\" in Meds & Orders section of the refill encounter.              Passed - Medication is active on med list        Passed - Patient is age 18 or older            clopidogrel (PLAVIX) 75 MG tablet 60 tablet 3 6/25/2021  No   Sig: TAKE 1 TABLET(75 MG) BY MOUTH DAILY   Sent to pharmacy as: Clopidogrel Bisulfate 75 MG Oral Tablet (PLAVIX)   Class: E-Prescribe   Order: [id number]   E-Prescribing Status: Receipt confirmed by pharmacy (6/25/2021  8:30 AM CDT)     Nava Kauffman RN on 3/7/2022 at 3:31 PM        "

## 2022-03-08 RX ORDER — CLOPIDOGREL BISULFATE 75 MG/1
TABLET ORAL
Qty: 60 TABLET | Refills: 3 | Status: SHIPPED | OUTPATIENT
Start: 2022-03-08 | End: 2022-06-17

## 2022-03-10 ENCOUNTER — E-VISIT (OUTPATIENT)
Dept: FAMILY MEDICINE | Facility: CLINIC | Age: 67
End: 2022-03-10
Payer: COMMERCIAL

## 2022-03-10 DIAGNOSIS — J06.9 VIRAL URI: Primary | ICD-10-CM

## 2022-03-10 PROCEDURE — 99421 OL DIG E/M SVC 5-10 MIN: CPT | Performed by: FAMILY MEDICINE

## 2022-03-11 NOTE — PATIENT INSTRUCTIONS
Viral Upper Respiratory Illness (Adult)    You have a viral upper respiratory illness (URI), which is another term for the common cold. This illness is contagious during the first few days. It is spread through the air by coughing and sneezing. It may also be spread by direct contact (touching the sick person and then touching your own eyes, nose, or mouth). Frequent handwashing will decrease risk of spread. Most viral illnesses go away within 7 to 10 days with rest and simple home remedies. Sometimes the illness may last for several weeks. Antibiotics will not kill a virus, and they are generally not prescribed for this condition.  Home care    If symptoms are severe, rest at home for the first 2 to 3 days. When you resume activity, don't let yourself get too tired.    Don't smoke. If you need help stopping, talk with your healthcare provider.    Avoid being exposed to cigarette smoke (yours or others ).    You may use acetaminophen or ibuprofen to control pain and fever, unless another medicine was prescribed. If you have chronic liver or kidney disease, have ever had a stomach ulcer or gastrointestinal bleeding, or are taking blood-thinning medicines, talk with your healthcare provider before using these medicines. Aspirin should never be given to anyone under 18 years of age who is ill with a viral infection or fever. It may cause severe liver or brain damage.    Your appetite may be poor, so a light diet is fine. Stay well hydrated by drinking 6 to 8 glasses of fluids per day (water, soft drinks, juices, tea, or soup). Extra fluids will help loosen secretions in the nose and lungs.    Over-the-counter cold medicines will not shorten the length of time you re sick, but they may be helpful for the following symptoms: cough, sore throat, and nasal and sinus congestion. If you take prescription medicines, ask your healthcare provider or pharmacist which over-the-counter medicines are safe to use. (Note: Don't  use decongestants if you have high blood pressure.)  Follow-up care  Follow up with your healthcare provider, or as advised.  When to seek medical advice  Call your healthcare provider right away if any of these occur:    Cough with lots of colored sputum (mucus)    Severe headache; face, neck, or ear pain    Difficulty swallowing due to throat pain    Fever of 100.4 F (38 C) or higher, or as directed by your healthcare provider  Call 911  Call 911 if any of these occur:    Chest pain, shortness of breath, wheezing, or difficulty breathing    Coughing up blood    Very severe pain with swallowing, especially if it goes along with a muffled voice   [employer] last reviewed this educational content on 6/1/2018 2000-2021 [employer]. All rights reserved. This information is not intended as a substitute for professional medical care. Always follow your healthcare professional's instructions.        Good afternoon Pedrito.  Based on the information you provided, lack of fever, this is most likely viral.  I provided some educational materials.  Please follow-up if no improvement or any worsening, a face-to-face appointment would be best.  We might need additional testing if you start to develop a fever.    Vinay Garcia MD, FAAFP  Family Medicine Physician  [facility]  [address] [place] [address]

## 2022-03-21 ENCOUNTER — TELEPHONE (OUTPATIENT)
Dept: CARDIOLOGY | Facility: CLINIC | Age: 67
End: 2022-03-21
Payer: COMMERCIAL

## 2022-03-21 DIAGNOSIS — I20.0 UNSTABLE ANGINA (H): ICD-10-CM

## 2022-03-21 RX ORDER — METOPROLOL SUCCINATE 25 MG/1
25 TABLET, EXTENDED RELEASE ORAL DAILY
Qty: 30 TABLET | Refills: 0 | Status: SHIPPED | OUTPATIENT
Start: 2022-03-21 | End: 2022-09-23

## 2022-03-21 NOTE — TELEPHONE ENCOUNTER
"Requested Prescriptions   Signed Prescriptions Disp Refills    metoprolol succinate ER (TOPROL-XL) 25 MG 24 hr tablet 30 tablet 0     Sig: Take 1 tablet (25 mg) by mouth daily LAST REFILL---For additional refills, please schedule a follow-up appointment.       Beta-Blockers Protocol Failed - 3/21/2022  8:38 AM        Failed - Recent (12 mo) or future (30 days) visit within the authorizing provider's specialty     Patient has had an office visit with the authorizing provider or a provider within the authorizing providers department within the previous 12 mos or has a future within next 30 days. See \"Patient Info\" tab in inbasket, or \"Choose Columns\" in Meds & Orders section of the refill encounter.              Passed - Blood pressure under 140/90 in past 12 months     BP Readings from Last 3 Encounters:   01/18/22 124/78   12/30/21 135/85   09/16/21 124/68                 Passed - Patient is age 6 or older        Passed - Medication is active on med list           One ata refill given, message sent last refill, needs appt    Kimberly Swartz RN  Medical Speciality Care Coordinator  [facility]  Phone: [phone]    "

## 2022-03-21 NOTE — TELEPHONE ENCOUNTER
metoprolol succinate ER (TOPROL-XL) 25 MG 24 hr tablet      Last Written Prescription Date:  12-27-21  Last Fill Quantity: 90,   # refills: 0  Last Office Visit : 10-16-20 ( RTC 6 M)  Future Office visit:  none    Routing refill request to provider for review/approval because:  Last appt 10-16-20, previous ata RF      Scheduling has been notified to contact the pt for appointment.

## 2022-03-21 NOTE — TELEPHONE ENCOUNTER
3/211st attempt  LVM to schedule follow up with Cornelius, provided patient with callback [phone].       ----- Message from Geetha Glover RN sent at 3/21/2022  8:38 AM CDT -----  Regarding: scheduling  Cardiology clinic, pt of Dr. Shields  Please call to schedule.    Thanks    I do not need any follow up on the scheduling of this appointment unless the patient will no longer be receiving care in our clinic.

## 2022-03-23 DIAGNOSIS — I20.0 UNSTABLE ANGINA (H): ICD-10-CM

## 2022-03-23 DIAGNOSIS — E78.5 HYPERLIPIDEMIA LDL GOAL <100: ICD-10-CM

## 2022-03-25 RX ORDER — ISOSORBIDE MONONITRATE 30 MG/1
30 TABLET, EXTENDED RELEASE ORAL DAILY
Qty: 60 TABLET | Refills: 0 | Status: SHIPPED | OUTPATIENT
Start: 2022-03-25 | End: 2022-04-21

## 2022-03-25 RX ORDER — EZETIMIBE 10 MG/1
10 TABLET ORAL DAILY
Qty: 90 TABLET | Refills: 0 | Status: SHIPPED | OUTPATIENT
Start: 2022-03-25 | End: 2022-04-21

## 2022-03-25 NOTE — TELEPHONE ENCOUNTER
"Requested Prescriptions   Pending Prescriptions Disp Refills     ezetimibe (ZETIA) 10 MG tablet 90 tablet 0     Sig: Take 1 tablet (10 mg) by mouth daily       Antihyperlipidemic agents Failed - 3/25/2022 12:43 PM        Failed - Normal serum ALT on record in past 12 mos     Recent Labs   Lab Test 08/19/20  1520   ALT 49             Failed - Recent (12 mo) or future (30 days) visit within the authorizing provider's specialty     Patient has had an office visit with the authorizing provider or a provider within the authorizing providers department within the previous 12 mos or has a future within next 30 days. See \"Patient Info\" tab in inbasket, or \"Choose Columns\" in Meds & Orders section of the refill encounter.              Passed - Lipid panel on file in past 12 mos     Recent Labs   Lab Test 05/07/21  0717   CHOL 160   TRIG 161*   HDL 54   LDL 74   NHDL 106               Passed - Medication is active on med list        Passed - Patient is age 18 years or older         Signed Prescriptions Disp Refills    isosorbide mononitrate (IMDUR) 30 MG 24 hr tablet 60 tablet 0     Sig: Take 1 tablet (30 mg) by mouth daily       Nitrates Failed - 3/25/2022 12:37 PM        Failed - Recent (12 mo) or future (30 days) visit within the authorizing provider's specialty     Patient has had an office visit with the authorizing provider or a provider within the authorizing providers department within the previous 12 mos or has a future within next 30 days. See \"Patient Info\" tab in inbasket, or \"Choose Columns\" in Meds & Orders section of the refill encounter.              Passed - Blood pressure under 140/90 in past 12 months     BP Readings from Last 3 Encounters:   01/18/22 124/78   12/30/21 135/85   09/16/21 124/68                 Passed - Pt is not on erectile dysfunction medications        Passed - Medication is active on med list        Passed - Patient is age 18 or older           Patient has follow up appt in June, will ask " for labs then     Kimberly Swartz RN  Medical Speciality Care Coordinator  [facility]  Phone: [phone]

## 2022-03-25 NOTE — TELEPHONE ENCOUNTER
ZETIA 10MG  Last Written Prescription Date:  12/27/21  Last Fill Quantity: 30,   # refills: 0  Last Office Visit : 10/16/20  Future Office visit:  5/17/22  RTC 6 MOS  ( OVER DUE )   Routing refill request to provider for review/approval because:  Drug not on the refill protocol

## 2022-04-12 NOTE — PROGRESS NOTES
DISCHARGE SUMMARY    Pedrito Negron was seen 2 times for evaluation and treatment.  Patient did not return for further treatment and current status is unknown.  Due to short treatment duration, no objective or functional changes were made.  Please see goal flow sheet from episode noted date below and initial evaluation for further information.  Patient is discharged from therapy and therapy episode is resolved as of 04/12/22.      Linked Episodes   Type: Episode: Status: Noted: Resolved: Last update: Updated by:   PHYSICAL THERAPY low back pain / left hip pain 1/20/22 Active 1/20/2022 2/4/2022  2:55 PM Jairo Heard, PT      Comments:

## 2022-04-21 ENCOUNTER — OFFICE VISIT (OUTPATIENT)
Dept: NEUROLOGY | Facility: CLINIC | Age: 67
End: 2022-04-21
Payer: COMMERCIAL

## 2022-04-21 VITALS
BODY MASS INDEX: 31.5 KG/M2 | HEIGHT: 70 IN | DIASTOLIC BLOOD PRESSURE: 67 MMHG | HEART RATE: 73 BPM | WEIGHT: 220 LBS | SYSTOLIC BLOOD PRESSURE: 117 MMHG

## 2022-04-21 DIAGNOSIS — E78.5 HYPERLIPIDEMIA LDL GOAL <100: ICD-10-CM

## 2022-04-21 DIAGNOSIS — I10 ESSENTIAL HYPERTENSION: ICD-10-CM

## 2022-04-21 DIAGNOSIS — I20.0 UNSTABLE ANGINA (H): ICD-10-CM

## 2022-04-21 DIAGNOSIS — G25.0 ESSENTIAL TREMOR: Primary | ICD-10-CM

## 2022-04-21 PROCEDURE — 99214 OFFICE O/P EST MOD 30 MIN: CPT | Performed by: STUDENT IN AN ORGANIZED HEALTH CARE EDUCATION/TRAINING PROGRAM

## 2022-04-21 RX ORDER — EZETIMIBE 10 MG/1
10 TABLET ORAL DAILY
Qty: 90 TABLET | Refills: 0 | Status: SHIPPED | OUTPATIENT
Start: 2022-04-21 | End: 2022-06-17

## 2022-04-21 RX ORDER — GABAPENTIN 300 MG/1
300 CAPSULE ORAL 3 TIMES DAILY
Qty: 270 CAPSULE | Refills: 1 | Status: SHIPPED | OUTPATIENT
Start: 2022-04-21 | End: 2022-09-28

## 2022-04-21 RX ORDER — ATORVASTATIN CALCIUM 80 MG/1
TABLET, FILM COATED ORAL
Qty: 90 TABLET | Refills: 1 | Status: SHIPPED | OUTPATIENT
Start: 2022-04-21 | End: 2022-06-17

## 2022-04-21 RX ORDER — METOPROLOL SUCCINATE 50 MG/1
50 TABLET, EXTENDED RELEASE ORAL DAILY
Qty: 90 TABLET | Refills: 1 | Status: SHIPPED | OUTPATIENT
Start: 2022-04-21 | End: 2022-09-27

## 2022-04-21 RX ORDER — ISOSORBIDE MONONITRATE 30 MG/1
30 TABLET, EXTENDED RELEASE ORAL DAILY
Qty: 90 TABLET | Refills: 0 | Status: SHIPPED | OUTPATIENT
Start: 2022-04-21 | End: 2022-06-17

## 2022-04-21 ASSESSMENT — PAIN SCALES - GENERAL: PAINLEVEL: NO PAIN (0)

## 2022-04-21 NOTE — LETTER
4/21/2022         RE: Pedriot Negron  [address]  [facility] [address]        Dear Colleague,    Thank you for referring your patient, Pedrito Negron, to the [facility] NEUROLOGY [facility] [place]. Please see a copy of my visit note below.    [facility]  Section of General Neurology  Return Patient Visit    Pedrito Negron MRN# [id number]   Age: 66 year old YOB: 1955     Brief history of symptoms: The patient was initially seen in neurologic consultation on 12/30/21 for evaluation of tremor. Please see the comprehensive neurologic consultation note from that date in the Epic records for details.         Interval history:   Metoprolol 25 mg ER--Remains on this.    Gabapentin 300 mg TID ---does notice a worsening off of this.   PT for hip pain --Was limited in this regard initially, can do the exercises now.  The hip pain however is better.    He still enjoys golfing.   He feels he could golf today.    He is out of some cardiology medications, can't get into see them until June.   A few of his siblings who had similar tremor than had passed away.      He notes some degree of tightness in his low back/thigh at times now too.          Past Medical History:     Patient Active Problem List   Diagnosis     Coronary atherosclerosis     H/O acute myocardial infarction     H/O diverticulitis of colon     H/O ETOH abuse     STEMI (ST elevation myocardial infarction) (H)     Hyperlipidemia LDL goal <100     Essential hypertension     Morbid obesity (H)     Chronic left-sided low back pain without sciatica     Past Medical History:   Diagnosis Date     Diverticulitis      Hypertension         Past Surgical History:     Past Surgical History:   Procedure Laterality Date     COLONOSCOPY WITH CO2 INSUFFLATION N/A 9/14/2020    Procedure: COLONOSCOPY, WITH CO2 INSUFFLATION;  Surgeon: Melinda Saleem MD;  Location: [facility] OR     large bowel resection      diverticulitis      "STENT  2018    cardiac        Social History:     Social History     Tobacco Use     Smoking status: Former Smoker     Types: Cigarettes     Quit date:      Years since quittin.3     Smokeless tobacco: Never Used   Vaping Use     Vaping Use: Never used   Substance Use Topics     Alcohol use: Yes     Comment: 7 beers per week      Drug use: Never        Family History:     Family History   Problem Relation Age of Onset     Diabetes Sister         Medications:     Current Outpatient Medications   Medication Sig     aspirin (ASA) 81 MG EC tablet Take 81 mg by mouth daily     atorvastatin (LIPITOR) 80 MG tablet TAKE 1 TABLET(80 MG) BY MOUTH DAILY     clopidogrel (PLAVIX) 75 MG tablet TAKE 1 TABLET(75 MG) BY MOUTH DAILY     diclofenac (VOLTAREN) 1 % topical gel Apply 4 g topically 4 times daily     ezetimibe (ZETIA) 10 MG tablet Take 1 tablet (10 mg) by mouth daily     gabapentin (NEURONTIN) 300 MG capsule Take 1 capsule (300 mg) by mouth 3 times daily     isosorbide mononitrate (IMDUR) 30 MG 24 hr tablet Take 1 tablet (30 mg) by mouth daily     lisinopril (ZESTRIL) 30 MG tablet TAKE 1 TABLET(30 MG) BY MOUTH DAILY     metoprolol succinate ER (TOPROL-XL) 25 MG 24 hr tablet Take 1 tablet (25 mg) by mouth daily LAST REFILL---For additional refills, please schedule a follow-up appointment.     Multiple Vitamin (MULTI-VITAMINS) TABS Take 1 tablet by mouth daily     nitroGLYcerin (NITROSTAT) 0.4 MG sublingual tablet ONE TABLET UNDER TONGUE AS NEEDED FOR CHEST PAIN- IF NO RELIEF AFTER 5 MINUTES CALL 911; USE 1 TABLET EVERY 5 MINUTES- MAX OF 3 TABLETS     tiZANidine (ZANAFLEX) 2 MG tablet TAKE 1 TABLET(2 MG) BY MOUTH EVERY NIGHT AS NEEDED FOR MUSCLE SPASMS     No current facility-administered medications for this visit.        Allergies:   No Known Allergies       Physical Exam:   Vitals: /67 (BP Location: Right arm, Patient Position: Sitting, Cuff Size: Adult Regular)   Pulse 73   Ht 1.778 m (5' 10\")   Wt 99.8 " kg (220 lb)   BMI 31.57 kg/m         Neuro:   General Appearance: No apparent distress, well-nourished, well-groomed, pleasant     Mental Status: Alert and oriented to person, place, and time. Speech fluent and comprehension intact. No dysarthria    Cranial Nerves:   II: Visual fields: normal  III: Pupils: 3 mm, equal, round, reactive to light   III,IV,VI: Extraocular Movements: intact     Motor Exam:   Upper Extremities  Deltoid  Bicep  Tricep  Wrist Extensors  strength Intrinsic Muscles    Right  5  5  5  5 5 5    Left  5  5  5  5 5 5      Lower Extremities  Hip Flexors  Knee Extensors  Knee   Flexors  Dorsi Flexion  Plantar   Flexion    Right  5  5  5  5  5    Left  5  5  5  5  5        Fine intermittent bilateral action tremor seen intermittently.      Coordination: no dysmetria seen    Reflexes: biceps, triceps, brachioradialis, patellar, and ankle jerks 2+ and symmetric.     Gait: normal casual gait, normal stride length         Data: Pertinent prior to visit     Laboratory:  TSH   Date Value Ref Range Status   08/19/2020 1.98 0.40 - 4.00 mU/L Final              Assessment and Plan:   Mr. Pedrito Negron is a very pleasant 66 year old man who presents in follow up for tremor.  It is a similar tremor to many family members emanating from this mother's side of the family.  He has no signs of Parkinsonism and this to my estimation this represents a classic familial essential tremor.  He is doing well on the gabapentin without side effects.  He did notice a worsening when he was briefly out of it.  He is out of a few of his cardiology medications today and can't get in until June, refilled these as well.  Discussed increasing his beta blocker for the purposes of the tremor.  He is amenable.  Discussed side effects to be mindful of most notably lightheadedness but I think he will tolerate the increase well.        Plan:  --Increase metoprolol to 50 mg daily  --continue gabapentin 300 mg TID  --Imdur,  "atorvastatin, zetia filled for him as well.   --He will follow up with me in 6 months              Bobby Daniels MD   of Neurology   [facility]      The total time of this encounter today amounted to 32 minutes. This time included time spent with the patient, prep work, ordering tests, and performing post visit documentation.      Pedrito Negron's goals for this visit include:   Chief Complaint   Patient presents with     RECHECK     tremor       He requests these members of his care team be copied on today's visit information: yes    PCP: Vinay Garcia    Referring Provider:  No referring provider defined for this encounter.    /67 (BP Location: Right arm, Patient Position: Sitting, Cuff Size: Adult Regular)   Pulse 73   Ht 1.778 m (5' 10\")   Wt 99.8 kg (220 lb)   BMI 31.57 kg/m      Do you need any medication refills at today's visit? Yes/   Uzair Lam CMA          Again, thank you for allowing me to participate in the care of your patient.        Sincerely,        Juan A Daniels MD    "

## 2022-04-21 NOTE — PROGRESS NOTES
[facility]  Section of General Neurology  Return Patient Visit    Pedrito Negron MRN# [id number]   Age: 66 year old YOB: 1955     Brief history of symptoms: The patient was initially seen in neurologic consultation on 21 for evaluation of tremor. Please see the comprehensive neurologic consultation note from that date in the Epic records for details.         Interval history:   Metoprolol 25 mg ER--Remains on this.    Gabapentin 300 mg TID ---does notice a worsening off of this.   PT for hip pain --Was limited in this regard initially, can do the exercises now.  The hip pain however is better.    He still enjoys golfing.   He feels he could golf today.    He is out of some cardiology medications, can't get into see them until .   A few of his siblings who had similar tremor than had passed away.      He notes some degree of tightness in his low back/thigh at times now too.          Past Medical History:     Patient Active Problem List   Diagnosis     Coronary atherosclerosis     H/O acute myocardial infarction     H/O diverticulitis of colon     H/O ETOH abuse     STEMI (ST elevation myocardial infarction) (H)     Hyperlipidemia LDL goal <100     Essential hypertension     Morbid obesity (H)     Chronic left-sided low back pain without sciatica     Past Medical History:   Diagnosis Date     Diverticulitis      Hypertension         Past Surgical History:     Past Surgical History:   Procedure Laterality Date     COLONOSCOPY WITH CO2 INSUFFLATION N/A 2020    Procedure: COLONOSCOPY, WITH CO2 INSUFFLATION;  Surgeon: Melinda Saleem MD;  Location: [facility] OR     large bowel resection      diverticulitis     STENT  2018    cardiac        Social History:     Social History     Tobacco Use     Smoking status: Former Smoker     Types: Cigarettes     Quit date:      Years since quittin.3     Smokeless tobacco: Never Used   Vaping Use     Vaping Use: Never used  "  Substance Use Topics     Alcohol use: Yes     Comment: 7 beers per week      Drug use: Never        Family History:     Family History   Problem Relation Age of Onset     Diabetes Sister         Medications:     Current Outpatient Medications   Medication Sig     aspirin (ASA) 81 MG EC tablet Take 81 mg by mouth daily     atorvastatin (LIPITOR) 80 MG tablet TAKE 1 TABLET(80 MG) BY MOUTH DAILY     clopidogrel (PLAVIX) 75 MG tablet TAKE 1 TABLET(75 MG) BY MOUTH DAILY     diclofenac (VOLTAREN) 1 % topical gel Apply 4 g topically 4 times daily     ezetimibe (ZETIA) 10 MG tablet Take 1 tablet (10 mg) by mouth daily     gabapentin (NEURONTIN) 300 MG capsule Take 1 capsule (300 mg) by mouth 3 times daily     isosorbide mononitrate (IMDUR) 30 MG 24 hr tablet Take 1 tablet (30 mg) by mouth daily     lisinopril (ZESTRIL) 30 MG tablet TAKE 1 TABLET(30 MG) BY MOUTH DAILY     metoprolol succinate ER (TOPROL-XL) 25 MG 24 hr tablet Take 1 tablet (25 mg) by mouth daily LAST REFILL---For additional refills, please schedule a follow-up appointment.     Multiple Vitamin (MULTI-VITAMINS) TABS Take 1 tablet by mouth daily     nitroGLYcerin (NITROSTAT) 0.4 MG sublingual tablet ONE TABLET UNDER TONGUE AS NEEDED FOR CHEST PAIN- IF NO RELIEF AFTER 5 MINUTES CALL 911; USE 1 TABLET EVERY 5 MINUTES- MAX OF 3 TABLETS     tiZANidine (ZANAFLEX) 2 MG tablet TAKE 1 TABLET(2 MG) BY MOUTH EVERY NIGHT AS NEEDED FOR MUSCLE SPASMS     No current facility-administered medications for this visit.        Allergies:   No Known Allergies       Physical Exam:   Vitals: /67 (BP Location: Right arm, Patient Position: Sitting, Cuff Size: Adult Regular)   Pulse 73   Ht 1.778 m (5' 10\")   Wt 99.8 kg (220 lb)   BMI 31.57 kg/m         Neuro:   General Appearance: No apparent distress, well-nourished, well-groomed, pleasant     Mental Status: Alert and oriented to person, place, and time. Speech fluent and comprehension intact. No dysarthria    Cranial " Nerves:   II: Visual fields: normal  III: Pupils: 3 mm, equal, round, reactive to light   III,IV,VI: Extraocular Movements: intact     Motor Exam:   Upper Extremities  Deltoid  Bicep  Tricep  Wrist Extensors  strength Intrinsic Muscles    Right  5  5  5  5 5 5    Left  5  5  5  5 5 5      Lower Extremities  Hip Flexors  Knee Extensors  Knee   Flexors  Dorsi Flexion  Plantar   Flexion    Right  5  5  5  5  5    Left  5  5  5  5  5        Fine intermittent bilateral action tremor seen intermittently.      Coordination: no dysmetria seen    Reflexes: biceps, triceps, brachioradialis, patellar, and ankle jerks 2+ and symmetric.     Gait: normal casual gait, normal stride length         Data: Pertinent prior to visit     Laboratory:  TSH   Date Value Ref Range Status   08/19/2020 1.98 0.40 - 4.00 mU/L Final              Assessment and Plan:   Mr. Pedrito Negron is a very pleasant 66 year old man who presents in follow up for tremor.  It is a similar tremor to many family members emanating from this mother's side of the family.  He has no signs of Parkinsonism and this to my estimation this represents a classic familial essential tremor.  He is doing well on the gabapentin without side effects.  He did notice a worsening when he was briefly out of it.  He is out of a few of his cardiology medications today and can't get in until June, refilled these as well.  Discussed increasing his beta blocker for the purposes of the tremor.  He is amenable.  Discussed side effects to be mindful of most notably lightheadedness but I think he will tolerate the increase well.        Plan:  --Increase metoprolol to 50 mg daily  --continue gabapentin 300 mg TID  --Imdur, atorvastatin, zetia filled for him as well.   --He will follow up with me in 6 months              Bobby Daniels MD   of Neurology   [facility]      The total time of this encounter today amounted to 32 minutes. This time  included time spent with the patient, prep work, ordering tests, and performing post visit documentation.

## 2022-04-21 NOTE — PROGRESS NOTES
"Pedrito Negron's goals for this visit include:   Chief Complaint   Patient presents with     RECHECK     tremor       He requests these members of his care team be copied on today's visit information: yes    PCP: Vinay Garcia    Referring Provider:  No referring provider defined for this encounter.    /67 (BP Location: Right arm, Patient Position: Sitting, Cuff Size: Adult Regular)   Pulse 73   Ht 1.778 m (5' 10\")   Wt 99.8 kg (220 lb)   BMI 31.57 kg/m      Do you need any medication refills at today's visit? Yes/   Uzair Lam CMA      "

## 2022-04-21 NOTE — PATIENT INSTRUCTIONS
Increase metoprolol to 50 mg daily  Continue gabapentin 300 mg TID  Keep up the good work overall.

## 2022-06-08 DIAGNOSIS — I20.0 UNSTABLE ANGINA (H): ICD-10-CM

## 2022-06-11 RX ORDER — ISOSORBIDE MONONITRATE 30 MG/1
TABLET, EXTENDED RELEASE ORAL
Qty: 90 TABLET | Refills: 0 | OUTPATIENT
Start: 2022-06-11

## 2022-06-17 ENCOUNTER — OFFICE VISIT (OUTPATIENT)
Dept: CARDIOLOGY | Facility: CLINIC | Age: 67
End: 2022-06-17
Payer: COMMERCIAL

## 2022-06-17 VITALS
DIASTOLIC BLOOD PRESSURE: 76 MMHG | BODY MASS INDEX: 32.66 KG/M2 | SYSTOLIC BLOOD PRESSURE: 140 MMHG | OXYGEN SATURATION: 95 % | WEIGHT: 227.6 LBS | HEART RATE: 79 BPM

## 2022-06-17 DIAGNOSIS — I20.0 UNSTABLE ANGINA (H): ICD-10-CM

## 2022-06-17 DIAGNOSIS — I10 ESSENTIAL HYPERTENSION: Primary | ICD-10-CM

## 2022-06-17 DIAGNOSIS — E78.5 HYPERLIPIDEMIA LDL GOAL <100: ICD-10-CM

## 2022-06-17 LAB
ANION GAP SERPL CALCULATED.3IONS-SCNC: 5 MMOL/L (ref 3–14)
BUN SERPL-MCNC: 25 MG/DL (ref 7–30)
CALCIUM SERPL-MCNC: 8.7 MG/DL (ref 8.5–10.1)
CHLORIDE BLD-SCNC: 106 MMOL/L (ref 94–109)
CHOLEST SERPL-MCNC: 145 MG/DL
CO2 SERPL-SCNC: 27 MMOL/L (ref 20–32)
CREAT SERPL-MCNC: 1.28 MG/DL (ref 0.66–1.25)
ERYTHROCYTE [DISTWIDTH] IN BLOOD BY AUTOMATED COUNT: 12.9 % (ref 10–15)
FASTING STATUS PATIENT QL REPORTED: NO
GFR SERPL CREATININE-BSD FRML MDRD: 61 ML/MIN/1.73M2
GLUCOSE BLD-MCNC: 95 MG/DL (ref 70–99)
HBA1C MFR BLD: 5.6 % (ref 0–5.6)
HCT VFR BLD AUTO: 38 % (ref 40–53)
HDLC SERPL-MCNC: 50 MG/DL
HGB BLD-MCNC: 12.8 G/DL (ref 13.3–17.7)
LDLC SERPL CALC-MCNC: 25 MG/DL
LDLC SERPL CALC-MCNC: 60 MG/DL
MCH RBC QN AUTO: 29.8 PG (ref 26.5–33)
MCHC RBC AUTO-ENTMCNC: 33.7 G/DL (ref 31.5–36.5)
MCV RBC AUTO: 89 FL (ref 78–100)
NONHDLC SERPL-MCNC: 95 MG/DL
PLATELET # BLD AUTO: 180 10E3/UL (ref 150–450)
POTASSIUM BLD-SCNC: 4.4 MMOL/L (ref 3.4–5.3)
RBC # BLD AUTO: 4.29 10E6/UL (ref 4.4–5.9)
SODIUM SERPL-SCNC: 138 MMOL/L (ref 133–144)
TRIGL SERPL-MCNC: 348 MG/DL
WBC # BLD AUTO: 6.8 10E3/UL (ref 4–11)

## 2022-06-17 PROCEDURE — 99215 OFFICE O/P EST HI 40 MIN: CPT | Performed by: INTERNAL MEDICINE

## 2022-06-17 PROCEDURE — 80061 LIPID PANEL: CPT | Performed by: INTERNAL MEDICINE

## 2022-06-17 PROCEDURE — 85027 COMPLETE CBC AUTOMATED: CPT | Performed by: INTERNAL MEDICINE

## 2022-06-17 PROCEDURE — 83721 ASSAY OF BLOOD LIPOPROTEIN: CPT | Performed by: INTERNAL MEDICINE

## 2022-06-17 PROCEDURE — 80048 BASIC METABOLIC PNL TOTAL CA: CPT | Performed by: INTERNAL MEDICINE

## 2022-06-17 PROCEDURE — 83036 HEMOGLOBIN GLYCOSYLATED A1C: CPT | Performed by: INTERNAL MEDICINE

## 2022-06-17 PROCEDURE — 36415 COLL VENOUS BLD VENIPUNCTURE: CPT | Performed by: INTERNAL MEDICINE

## 2022-06-17 RX ORDER — NITROGLYCERIN 0.4 MG/1
TABLET SUBLINGUAL
Qty: 25 TABLET | Refills: 0 | Status: SHIPPED | OUTPATIENT
Start: 2022-06-17 | End: 2022-09-15

## 2022-06-17 RX ORDER — ATORVASTATIN CALCIUM 80 MG/1
TABLET, FILM COATED ORAL
Qty: 90 TABLET | Refills: 3 | Status: SHIPPED | OUTPATIENT
Start: 2022-06-17 | End: 2023-08-04

## 2022-06-17 RX ORDER — ICOSAPENT ETHYL 1 G/1
2 CAPSULE ORAL 2 TIMES DAILY
Qty: 360 CAPSULE | Refills: 3 | Status: SHIPPED | OUTPATIENT
Start: 2022-06-17 | End: 2023-06-08

## 2022-06-17 RX ORDER — EZETIMIBE 10 MG/1
10 TABLET ORAL DAILY
Qty: 90 TABLET | Refills: 3 | Status: SHIPPED | OUTPATIENT
Start: 2022-06-17 | End: 2023-06-08

## 2022-06-17 RX ORDER — LISINOPRIL 30 MG/1
30 TABLET ORAL DAILY
Qty: 90 TABLET | Refills: 3 | Status: SHIPPED | OUTPATIENT
Start: 2022-06-17 | End: 2023-04-20

## 2022-06-17 RX ORDER — ISOSORBIDE MONONITRATE 30 MG/1
30 TABLET, EXTENDED RELEASE ORAL DAILY
Qty: 90 TABLET | Refills: 3 | Status: SHIPPED | OUTPATIENT
Start: 2022-06-17 | End: 2023-08-04

## 2022-06-17 NOTE — PROGRESS NOTES
[facility] Cardiology Consultation:    Assessment and Plan:        Assessment and Plan:     1- Known CAD with multiple PCIs (inferior STEMI on 12/17/2018 s/p DIMITRI to left circumflex/OM and DIMITRI*2 to the proximal and distal RCA,  PCI to LAD in 2010 and another PCI in 2003), mildly reduced LV fxn, stable angina class 2, stress CMR 09/2020 with mild-mod anterolateral ischemia (LAD or OM) in the setting of significant anemia:    - Continue asa and statin. DAPT for 3.5 yrs, can stop plavix now.  - Continue Imdur 30 mg daily and metoprolol succinate 50 daily     2. Ischemic cardiomyopathy (EF 51% on cardiac MRI 9/2020), lateral wall MI with 50% viability on CMR  - Continue lisinopril and metoprolol  - No need for diuretics (no heart failure symptoms)    3- Hypertension, controlled at home  Continue current regimen     4- Hyperlipidemia, goal LDL<70  - Lipitor 80 daily and Ezetimibe 10. Check FLP     5- Prior iron def anemia, rectal bleed from hemorrhoids, normal colonoscopy    A total of 40 minutes were spent on the day of the visit for chart review, care coordination, face-to-face consultation with the patient, and documentation.    Dio Shields MD    Cardiac Imaging and Prevention  [facility]  [email or web address] Pager: [phone]    HPI: CAD routine follow-up.  He has been doing well without any intercurrent medical illness or hospitalization.  He has not had any ED visits for chest pain or an angiogram.  He has not had any further GI bleeding and his hemoglobin has been stable.  He is due for his annual labs.  He checks his blood pressure regularly at home and it is controlled in the 120s.  He endorses stable angina, and it is relieved by rest.  He has not had to take any nitroglycerin, and is also run out of his prescription.  Though he does not get any angina on his usual walks or while playing golf, he did notice chest discomfort when there was construction happening  in his parking lot that increased his walk to his office from his car.  Denies any shortness of breath/dyspnea, orthopnea, pnd, palpitations, syncope/presyncope or edema.    EXAM:  BP (!) 140/76 (BP Location: Left arm, Patient Position: Sitting, Cuff Size: Adult Regular)   Pulse 79   Wt 103.2 kg (227 lb 9.6 oz)   SpO2 95%   BMI 32.66 kg/m    GEN/CONSTITUIONAL: Appears comfortable, in no apparent distress   EYES: No icterus  ENT/MOUTH: Normal  JVP:  Not visible  RESPIRATORY: Clear to auscultation bilaterally   CARDIOVASCULAR: Regular S1 and S2, no murmurs, rubs, or gallops.   ABDOMEN: Soft, non-tender, positive bowel sounds   NEUROLOGIC: Grossly non-focal   PSYCHIATRIC: Normal affect  EXT: No cyanosis, clubbing, edema. Normal pedal pulses.  Skin: No petechiae, purpura or rash    PAST MEDICAL HISTORY:  Past Medical History:   Diagnosis Date     Diverticulitis      Hypertension        CURRENT MEDICATIONS:  Current Outpatient Medications   Medication     aspirin (ASA) 81 MG EC tablet     atorvastatin (LIPITOR) 80 MG tablet     clopidogrel (PLAVIX) 75 MG tablet     diclofenac (VOLTAREN) 1 % topical gel     ezetimibe (ZETIA) 10 MG tablet     gabapentin (NEURONTIN) 300 MG capsule     isosorbide mononitrate (IMDUR) 30 MG 24 hr tablet     lisinopril (ZESTRIL) 30 MG tablet     metoprolol succinate ER (TOPROL-XL) 25 MG 24 hr tablet     metoprolol succinate ER (TOPROL-XL) 50 MG 24 hr tablet     Multiple Vitamin (MULTI-VITAMINS) TABS     nitroGLYcerin (NITROSTAT) 0.4 MG sublingual tablet     omeprazole (PRILOSEC) 20 MG DR capsule     tiZANidine (ZANAFLEX) 2 MG tablet     No current facility-administered medications for this visit.       PAST SURGICAL HISTORY:  Past Surgical History:   Procedure Laterality Date     COLONOSCOPY WITH CO2 INSUFFLATION N/A 9/14/2020    Procedure: COLONOSCOPY, WITH CO2 INSUFFLATION;  Surgeon: Melinda Saleem MD;  Location: [facility] OR     large bowel resection      diverticulitis     STENT  2018     cardiac       ALLERGIES   No Known Allergies    FAMILY HISTORY:  Family History   Problem Relation Age of Onset     Diabetes Sister        SOCIAL HISTORY:  Social History     Socioeconomic History     Marital status: Single     Spouse name: Not on file     Number of children: Not on file     Years of education: Not on file     Highest education level: Not on file   Occupational History     Not on file   Tobacco Use     Smoking status: Former Smoker     Types: Cigarettes     Quit date:      Years since quittin.4     Smokeless tobacco: Never Used   Vaping Use     Vaping Use: Never used   Substance and Sexual Activity     Alcohol use: Yes     Comment: 7 beers per week      Drug use: Never     Sexual activity: Not Currently   Other Topics Concern     Parent/sibling w/ CABG, MI or angioplasty before 65F 55M? Not Asked   Social History Narrative     Not on file     Social Determinants of Health     Financial Resource Strain: Not on file   Food Insecurity: Not on file   Transportation Needs: Not on file   Physical Activity: Not on file   Stress: Not on file   Social Connections: Not on file   Intimate Partner Violence: Not on file   Housing Stability: Not on file       ROS:   Constitutional: No fever, chills, or sweats. No weight gain/loss   ENT: No visual disturbance, ear ache, epistaxis, sore throat  Allergies/Immunologic: Negative.   Respiratory: No cough, hemoptysia  Cardiovascular: As per HPI  GI: No nausea, vomiting, hematemesis, melena, or hematochezia  : No urinary frequency, dysuria, or hematuria  Integument: Negative  Psychiatric: Negative  Neuro: Negative  Endocrinology: Negative   Musculoskeletal: Negative    ADDITIONAL COMMENTS:     I reviewed the patient's medications:     I reviewed the patient's pertinent clinical laboratory studies:     I reviewed the patient's pertinent imaging studies:   I reviewed the patient's ECG:

## 2022-06-17 NOTE — RESULT ENCOUNTER NOTE
The test results are stable and unchanged.  You do not have diabetes.  Your triglycerides continue to be elevated.  Walking and cutting down on carbohydrate intake will help for this.  In addition, I would like to start another medication to help lower the triglycerides and risk of heart disease progression.  It is called Vascepa.  Let me know if your co-pay for this is ridiculous.    Clinic staff: I ordered direct LDL as an add-on lab to his Friday evening blood work.  Please make sure the lab adds it on.    Dr Shields

## 2022-06-17 NOTE — PROGRESS NOTES
Pedrito Negron's goals for this visit include: Wants to talk about the Diet. Adjusting his diet to lose weight and eat healthier. He is a golfer and wants to be able to stay out there on the course. He is having lower back problems and cant exercise as well. What kind of exercise can he do and how strenuous can it be? He doesn't believe he has Angina and has some discomfort in his chest. When he sits it goes away. Would like to know about Nitroglycerin. He did have some on his keychain but he didn't need them.     He requests these members of his care team be copied on today's visit information: PCP    PCP: Vinay Garcia    Referring Provider:  Referred Self, MD  No address on file    BP (!) 140/76 (BP Location: Left arm, Patient Position: Sitting, Cuff Size: Adult Regular)   Pulse 79   Wt 103.2 kg (227 lb 9.6 oz)   SpO2 95%   BMI 32.66 kg/m      Do you need any medication refills at today's visit? No.    Erlin Chatman, EMT  Clinic Support  [facility]    [phone]    Employed by [employer] [facility] [employer]

## 2022-06-20 ENCOUNTER — TELEPHONE (OUTPATIENT)
Dept: CARDIOLOGY | Facility: CLINIC | Age: 67
End: 2022-06-20
Payer: COMMERCIAL

## 2022-06-20 NOTE — TELEPHONE ENCOUNTER
6/20 Provided phone number [phone] to schedule follow up  in about 1 year (around 6/17/2023).    Eileen guillen Procedure   Orthopedics, Podiatry, Sports Medicine, ENT/Eye Specialties  [facility]   [phone]

## 2022-06-20 NOTE — TELEPHONE ENCOUNTER
"Called patient to let him know the results of his recent lab work and Dr Crane findings. .     \"The test results are stable and unchanged.  You do not have diabetes.   Your triglycerides continue to be elevated.  Walking and cutting down on carbohydrate intake will help for this.  In addition, I would like to start another medication to help lower the triglycerides and risk of heart disease progression.  It is called Vascepa.  Let me know if your co-pay for this is ridiculous.\"    Left message with callback number if he had any questions.     Erlin Chatman, EMT  Clinic Support  [facility]    [phone]    Employed by [facility] [employer]         "

## 2022-06-27 ENCOUNTER — TELEPHONE (OUTPATIENT)
Dept: NEUROLOGY | Facility: CLINIC | Age: 67
End: 2022-06-27

## 2022-06-27 NOTE — TELEPHONE ENCOUNTER
Maiar attempted to reach the patient and reschedule the appointment with Dr. Daniels (in person) on 10/27/2022.  Provider will not be in Clinic.    Writer patience.    [facility]   Neurology, NeuroSurgery, NeuroPsychology and Pain Management Specialties

## 2022-07-17 ENCOUNTER — HEALTH MAINTENANCE LETTER (OUTPATIENT)
Age: 67
End: 2022-07-17

## 2022-09-15 ENCOUNTER — MYC REFILL (OUTPATIENT)
Dept: CARDIOLOGY | Facility: CLINIC | Age: 67
End: 2022-09-15

## 2022-09-15 DIAGNOSIS — I20.0 UNSTABLE ANGINA (H): ICD-10-CM

## 2022-09-16 RX ORDER — NITROGLYCERIN 0.4 MG/1
TABLET SUBLINGUAL
Qty: 25 TABLET | Refills: 1 | Status: SHIPPED | OUTPATIENT
Start: 2022-09-16 | End: 2024-09-19

## 2022-09-16 NOTE — TELEPHONE ENCOUNTER
"Requested Prescriptions   Pending Prescriptions Disp Refills     nitroGLYcerin (NITROSTAT) 0.4 MG sublingual tablet 25 tablet 0     Sig: ONE TABLET UNDER TONGUE AS NEEDED FOR CHEST PAIN- IF NO RELIEF AFTER 5 MINUTES CALL 911; USE 1 TABLET EVERY 5 MINUTES- MAX OF 3 TABLETS       Nitrates Failed - 9/15/2022  8:20 PM        Failed - Blood pressure under 140/90 in past 12 months     BP Readings from Last 3 Encounters:   06/17/22 (!) 140/76   04/21/22 117/67   01/18/22 124/78                 Failed - Sublingual nitro order needs review     If refill exceeds 1 bottle per month, please forward request to provider.           Passed - Pt is not on erectile dysfunction medications        Passed - Recent (12 mo) or future (30 days) visit within the authorizing provider's specialty     Patient has had an office visit with the authorizing provider or a provider within the authorizing providers department within the previous 12 mos or has a future within next 30 days. See \"Patient Info\" tab in inbasket, or \"Choose Columns\" in Meds & Orders section of the refill encounter.              Passed - Medication is active on med list        Passed - Patient is age 18 or older           From last clinic visit on 6/17/22-  \" He checks his blood pressure regularly at home and it is controlled in the 120s.  He endorses stable angina, and it is relieved by rest.  He has not had to take any nitroglycerin, and is also run out of his prescription.  Though he does not get any angina on his usual walks or while playing golf, he did notice chest discomfort when there was construction happening in his parking lot that increased his walk to his office from his car. \"  Will send in refill and send message in [employer] checking on frequency of any chest pains     Kimberly Swartz RN  Cardiology Care Coordinator  [facility]  Phone: [phone]    "

## 2022-09-18 ENCOUNTER — HEALTH MAINTENANCE LETTER (OUTPATIENT)
Age: 67
End: 2022-09-18

## 2022-09-22 ASSESSMENT — ENCOUNTER SYMPTOMS: ARTHRALGIAS: 1

## 2022-09-22 ASSESSMENT — PATIENT HEALTH QUESTIONNAIRE - PHQ9
10. IF YOU CHECKED OFF ANY PROBLEMS, HOW DIFFICULT HAVE THESE PROBLEMS MADE IT FOR YOU TO DO YOUR WORK, TAKE CARE OF THINGS AT HOME, OR GET ALONG WITH OTHER PEOPLE: VERY DIFFICULT
SUM OF ALL RESPONSES TO PHQ QUESTIONS 1-9: 4
SUM OF ALL RESPONSES TO PHQ QUESTIONS 1-9: 4

## 2022-09-22 ASSESSMENT — ACTIVITIES OF DAILY LIVING (ADL)
CURRENT_FUNCTION: MONEY MANAGEMENT REQUIRES ASSISTANCE
CURRENT_FUNCTION: HOUSEWORK REQUIRES ASSISTANCE

## 2022-09-23 ENCOUNTER — OFFICE VISIT (OUTPATIENT)
Dept: FAMILY MEDICINE | Facility: CLINIC | Age: 67
End: 2022-09-23
Payer: COMMERCIAL

## 2022-09-23 VITALS
BODY MASS INDEX: 30.78 KG/M2 | HEART RATE: 62 BPM | TEMPERATURE: 98.9 F | SYSTOLIC BLOOD PRESSURE: 136 MMHG | HEIGHT: 70 IN | WEIGHT: 215 LBS | RESPIRATION RATE: 18 BRPM | OXYGEN SATURATION: 97 % | DIASTOLIC BLOOD PRESSURE: 78 MMHG

## 2022-09-23 DIAGNOSIS — I10 ESSENTIAL HYPERTENSION: ICD-10-CM

## 2022-09-23 DIAGNOSIS — M54.42 CHRONIC LEFT-SIDED LOW BACK PAIN WITH LEFT-SIDED SCIATICA: ICD-10-CM

## 2022-09-23 DIAGNOSIS — E78.5 HYPERLIPIDEMIA LDL GOAL <100: ICD-10-CM

## 2022-09-23 DIAGNOSIS — M25.552 HIP PAIN, LEFT: ICD-10-CM

## 2022-09-23 DIAGNOSIS — I25.2 HISTORY OF ST ELEVATION MYOCARDIAL INFARCTION (STEMI): ICD-10-CM

## 2022-09-23 DIAGNOSIS — Z00.00 ENCOUNTER FOR MEDICARE ANNUAL WELLNESS EXAM: Primary | ICD-10-CM

## 2022-09-23 DIAGNOSIS — G89.29 CHRONIC LEFT-SIDED LOW BACK PAIN WITH LEFT-SIDED SCIATICA: ICD-10-CM

## 2022-09-23 DIAGNOSIS — R25.1 TREMOR: ICD-10-CM

## 2022-09-23 PROCEDURE — 99397 PER PM REEVAL EST PAT 65+ YR: CPT | Mod: 25 | Performed by: FAMILY MEDICINE

## 2022-09-23 PROCEDURE — 90471 IMMUNIZATION ADMIN: CPT | Performed by: FAMILY MEDICINE

## 2022-09-23 PROCEDURE — 99214 OFFICE O/P EST MOD 30 MIN: CPT | Mod: 25 | Performed by: FAMILY MEDICINE

## 2022-09-23 PROCEDURE — 90662 IIV NO PRSV INCREASED AG IM: CPT | Performed by: FAMILY MEDICINE

## 2022-09-23 ASSESSMENT — PAIN SCALES - GENERAL: PAINLEVEL: SEVERE PAIN (6)

## 2022-09-23 ASSESSMENT — ENCOUNTER SYMPTOMS: ARTHRALGIAS: 1

## 2022-09-23 NOTE — PATIENT INSTRUCTIONS
Patient Education   Personalized Prevention Plan  You are due for the preventive services outlined below.  Your care team is available to assist you in scheduling these services.  If you have already completed any of these items, please share that information with your care team to update in your medical record.  Health Maintenance Due   Topic Date Due     AORTIC ANEURYSM SCREENING (SYSTEM ASSIGNED)  Never done     COVID-19 Vaccine (4 - Booster for Moderna series) 03/15/2022     Annual Wellness Visit  05/14/2022     ANNUAL REVIEW OF HM ORDERS  05/14/2022     FALL RISK ASSESSMENT  05/14/2022     Flu Vaccine (1) 09/01/2022

## 2022-09-23 NOTE — PROGRESS NOTES
"SUBJECTIVE:   Pedrito is a 66 year old who presents for Preventive Visit.      Patient has been advised of split billing requirements and indicates understanding: Yes  Are you in the first 12 months of your Medicare coverage?  No-has no medicare coverage at this time preferredone    Healthy Habits:     In general, how would you rate your overall health?  Poor    Frequency of exercise:  None    Do you usually eat at least 4 servings of fruit and vegetables a day, include whole grains    & fiber and avoid regularly eating high fat or \"junk\" foods?  Yes    Taking medications regularly:  No    Barriers to taking medications:  None    Medication side effects:  Other    Ability to successfully perform activities of daily living:  Housework requires assistance and money management requires assistance    Home Safety:  No safety concerns identified    Hearing Impairment:  No hearing concerns    In the past 6 months, have you been bothered by leaking of urine?  No    In general, how would you rate your overall mental or emotional health?  Poor      PHQ-2 Total Score: 3    Additional concerns today:  Yes  discuss gabapentin- Patient should be continuing to take per last Neurology visit on 04/21/22. Patient stated he ran out, therefore would like a refill.   Talk about blood pressure Patient states he has been increasing dose of Lisinopril to two tablets versus the one tablet.   Do you feel safe in your environment? Yes    Have you ever done Advance Care Planning? (For example, a Health Directive, POLST, or a discussion with a medical provider or your loved ones about your wishes): No, advance care planning information given to patient to review.  Advanced care planning was discussed at today's visit. Not discussed, will discuss with RN at upcoming visit on Monday.        Fall risk  Fallen 2 or more times in the past year?: No  Any fall with injury in the past year?: No    Cognitive Screening   1) Repeat 3 items (Leader, Season, " Table)    2) Clock draw: NORMAL  3) 3 item recall: Recalls 3 objects  Results: 3 items recalled: COGNITIVE IMPAIRMENT LESS LIKELY    Mini-CogTM Copyright JENNYFER Cabrales. Licensed by the author for use in [facility]; reprinted with permission (steffany@[facility]). All rights reserved.      Reviewed and updated as needed this visit by clinical staff   Tobacco  Allergies  Meds   Med Hx  Surg Hx  Fam Hx  Soc Hx          Reviewed and updated as needed this visit by Provider                   Social History     Tobacco Use     Smoking status: Former Smoker     Types: Cigarettes     Quit date:      Years since quittin.7     Smokeless tobacco: Never Used   Substance Use Topics     Alcohol use: Yes     Comment: 7 beers per week          Alcohol Use 2022   Prescreen: >3 drinks/day or >7 drinks/week? No   Prescreen: >3 drinks/day or >7 drinks/week? -         Current providers sharing in care for this patient include:   Patient Care Team:  Vinay Garcia MD as PCP - General (Family Practice)  Vinay Garcia MD as Assigned PCP  Venu Silver MD as Assigned Surgical Provider  Juan A Daniels MD as Assigned Neuroscience Provider  Dio Shields MD as Assigned Heart and Vascular Provider    The following health maintenance items are reviewed in Epic and correct as of today:  Health Maintenance   Topic Date Due     AORTIC ANEURYSM SCREENING (SYSTEM ASSIGNED)  Never done     COVID-19 Vaccine (4 - Booster for Moderna series) 03/15/2022     ANNUAL REVIEW OF HM ORDERS  2022     INFLUENZA VACCINE (1) 2022     BMP  2023     LIPID  2023     MEDICARE ANNUAL WELLNESS VISIT  2023     FALL RISK ASSESSMENT  2023     ADVANCE CARE PLANNING  2027     COLORECTAL CANCER SCREENING  2030     DTAP/TDAP/TD IMMUNIZATION (3 - Td or Tdap) 2031     HEPATITIS C SCREENING  Completed     PHQ-2 (once per calendar year)  Completed     Pneumococcal Vaccine: 65+  "Years  Completed     ZOSTER IMMUNIZATION  Completed     IPV IMMUNIZATION  Aged Out     MENINGITIS IMMUNIZATION  Aged Out     HEPATITIS B IMMUNIZATION  Aged Out     Lab work is in process  Labs reviewed in EPIC  BP Readings from Last 3 Encounters:   09/23/22 136/78   06/17/22 (!) 140/76   04/21/22 117/67    Wt Readings from Last 3 Encounters:   09/23/22 97.5 kg (215 lb)   06/17/22 103.2 kg (227 lb 9.6 oz)   04/21/22 99.8 kg (220 lb)                  Review of Systems   Genitourinary: Negative for impotence and penile discharge.   Musculoskeletal: Positive for arthralgias.     Constitutional, HEENT, cardiovascular, pulmonary, GI, , musculoskeletal, neuro, skin, endocrine and psych systems are negative, except as otherwise noted.    OBJECTIVE:   /78   Pulse 62   Temp 98.9  F (37.2  C) (Temporal)   Resp 18   Ht 1.778 m (5' 10\")   Wt 97.5 kg (215 lb)   SpO2 97%   BMI 30.85 kg/m   Estimated body mass index is 30.85 kg/m  as calculated from the following:    Height as of this encounter: 1.778 m (5' 10\").    Weight as of this encounter: 97.5 kg (215 lb).  Physical Exam  GENERAL: healthy, alert and no distress  EYES: Eyes grossly normal to inspection, PERRL and conjunctivae and sclerae normal  HENT: ear canals and TM's normal, nose and mouth without ulcers or lesions  NECK: no adenopathy, no asymmetry, masses, or scars and thyroid normal to palpation  RESP: lungs clear to auscultation - no rales, rhonchi or wheezes  CV: regular rate and rhythm, normal S1 S2, no S3 or S4, no murmur, click or rub, no peripheral edema and peripheral pulses strong  ABDOMEN: soft, nontender, no hepatosplenomegaly, no masses and bowel sounds normal   (male): testicles normal without atrophy or masses, no hernias, penis normal without urethral discharge. Notes frequent urination that has been ongoing for some time.   MS: no gross musculoskeletal defects noted, no edema  SKIN: no suspicious lesions or rashes  NEURO: Normal strength " and tone, mentation intact and speech normal  NEURO: Normal strength and tone, mild tremor, sensory exam grossly normal and mentation intact  PSYCH: mentation appears normal, affect normal/bright    Diagnostic Test Results:  Labs reviewed in Epic    ASSESSMENT / PLAN:       ICD-10-CM    1. Encounter for Medicare annual wellness exam  Z00.00    2. History of ST elevation myocardial infarction (STEMI)  I25.2 Nutrition Referral   3. Hyperlipidemia LDL goal <100  E78.5 Nutrition Referral     Lipid panel reflex to direct LDL Fasting   4. Essential hypertension  I10 Basic metabolic panel  (Ca, Cl, CO2, Creat, Gluc, K, Na, BUN)   5. Tremor  R25.1    6. Chronic left-sided low back pain with left-sided sciatica  M54.42 MR Lumbar Spine w/o Contrast    G89.29    7. Hip pain, left  M25.552      66 - year- old male presented to clinic for a wellness check. Patient is confused on what medications he should be taking. Discussed that Cardiology wanted him to stop his Plavix, the Neurology increased his Metoprolol from 30 mg to 50 mg and he was to continue taking his Gabapentin, Imdur, Atorvastatin, and Zetia. Patient notes that he has been on FMLA due to his lower back pain. He initially started with with PT last year and he felt that the treatment was musculoskeletal based and was not working. Three weeks ago the patient started going to the Chiropractor and that seems to help his pain along with stretching exercises. Patient does note left hip pain when he takes a step on or off his left leg. Pain is rated at a 7/10. Talked about his diet he feels he eats a lot of fruits and vegetables, but doesn't feel he has a balanced nutrition.     Patient has been advised of split billing requirements and indicates understanding: Yes    COUNSELING:  Reviewed preventive health counseling, as reflected in patient instructions       Regular exercise       Healthy diet/nutrition       Colon cancer screening       Prostate cancer screening        "Medication adherence    Estimated body mass index is 30.85 kg/m  as calculated from the following:    Height as of this encounter: 1.778 m (5' 10\").    Weight as of this encounter: 97.5 kg (215 lb).    Weight management plan: Patient referred to endocrine and/or weight management specialty Discussed healthy diet and exercise guidelines    He reports that he quit smoking about 21 years ago. His smoking use included cigarettes. He has never used smokeless tobacco.      Appropriate preventive services were discussed with this patient, including applicable screening as appropriate for cardiovascular disease, diabetes, osteopenia/osteoporosis, and glaucoma.  As appropriate for age/gender, discussed screening for colorectal cancer, prostate cancer, breast cancer, and cervical cancer. Checklist reviewing preventive services available has been given to the patient.    Reviewed patients plan of care and provided an AVS. The Basic Care Plan (routine screening as documented in Health Maintenance) for Pedrito meets the Care Plan requirement. This Care Plan has been established and reviewed with the Patient.    Counseling Resources:  ATP IV Guidelines  Pooled Cohorts Equation Calculator  Breast Cancer Risk Calculator  Breast Cancer: Medication to Reduce Risk  FRAX Risk Assessment  ICSI Preventive Guidelines  Dietary Guidelines for Americans, 2010  [employer]'s MyPlate  ASA Prophylaxis  Lung CA Screening    Patient seen and examined with nurse practitioner student.  Agree with note above.  Main concern with Pedrito right now is fully understanding his medication regimen and ensuring compliance.    Vinay Garcia MD  [facility]    Identified Health Risks:  Answers for HPI/ROS submitted by the patient on 9/22/2022  If you checked off any problems, how difficult have these problems made it for you to do your work, take care of things at home, or get along with other people?: Very difficult  PHQ9 TOTAL SCORE: 4      "

## 2022-09-26 ENCOUNTER — ALLIED HEALTH/NURSE VISIT (OUTPATIENT)
Dept: FAMILY MEDICINE | Facility: CLINIC | Age: 67
End: 2022-09-26
Payer: COMMERCIAL

## 2022-09-26 ENCOUNTER — LAB (OUTPATIENT)
Dept: LAB | Facility: CLINIC | Age: 67
End: 2022-09-26

## 2022-09-26 DIAGNOSIS — G25.0 ESSENTIAL TREMOR: ICD-10-CM

## 2022-09-26 DIAGNOSIS — E78.5 HYPERLIPIDEMIA LDL GOAL <100: ICD-10-CM

## 2022-09-26 DIAGNOSIS — I10 ESSENTIAL HYPERTENSION: Primary | ICD-10-CM

## 2022-09-26 DIAGNOSIS — I10 ESSENTIAL HYPERTENSION: ICD-10-CM

## 2022-09-26 LAB
ANION GAP SERPL CALCULATED.3IONS-SCNC: 5 MMOL/L (ref 3–14)
BUN SERPL-MCNC: 28 MG/DL (ref 7–30)
CALCIUM SERPL-MCNC: 9.5 MG/DL (ref 8.5–10.1)
CHLORIDE BLD-SCNC: 107 MMOL/L (ref 94–109)
CHOLEST SERPL-MCNC: 130 MG/DL
CO2 SERPL-SCNC: 27 MMOL/L (ref 20–32)
CREAT SERPL-MCNC: 1.26 MG/DL (ref 0.66–1.25)
FASTING STATUS PATIENT QL REPORTED: YES
GFR SERPL CREATININE-BSD FRML MDRD: 63 ML/MIN/1.73M2
GLUCOSE BLD-MCNC: 118 MG/DL (ref 70–99)
HDLC SERPL-MCNC: 40 MG/DL
LDLC SERPL CALC-MCNC: 44 MG/DL
NONHDLC SERPL-MCNC: 90 MG/DL
POTASSIUM BLD-SCNC: 5 MMOL/L (ref 3.4–5.3)
SODIUM SERPL-SCNC: 139 MMOL/L (ref 133–144)
TRIGL SERPL-MCNC: 231 MG/DL

## 2022-09-26 PROCEDURE — 80061 LIPID PANEL: CPT

## 2022-09-26 PROCEDURE — 36415 COLL VENOUS BLD VENIPUNCTURE: CPT

## 2022-09-26 PROCEDURE — 80048 BASIC METABOLIC PNL TOTAL CA: CPT

## 2022-09-26 PROCEDURE — 99207 PR NO CHARGE LOS: CPT

## 2022-09-26 NOTE — PROGRESS NOTES
Medication Reconciliation completed with patient. Medication list updated and questions answered. Patient would like to get a refill of Gabapentin to help with his tremors. Refill was sent to PCP for approval.  RN created medication list for the patient listed below:      My Medications  Generic Name: Brand Name: Dosage: Instructions for Use: Reason Prescribed:   Aspirin ASA 81 MG Take one tablet by mouth daily.  Reduces the risk for cardiac events. IE: Heart Attack   Atorvastatin Calcium Lipitor 80 MG Take one tablet by mouth daily.  Cholesterol    Cholecalciferol   Take one tablet daily.  Prevents bone disorders.    Gabapentin Neurontin  300 MG Take one capsule (300 mg) by mouth three times daily.  Tremors   Icosapent Ethyl Icosapent Ethyl 1 G Take two tablets by mouth two times daily.  Cholesterol  Chest Pain   Isosorbide Mononitrate IMDUR 30 MG Take one tablet by mouth daily.  Unstable Angina (Chest Pain)   Lisinopril Zestril 30 MG Take one tablet 30 mg by mouth daily.  Blood Pressure   Metoprolol Succinate Toprol XL 50 MG Take one tablet by mouth daily.  Tremor  Chest Pain  Cholesterol   Multi-Vitamin Centrum Men s   Take one daily.     Nitroglycerin Nitrostat 0.4 MG Place one tablet under tongue as needed for chest pain. If no relief after 5 minutes call 911; Use one tablet every 5 minutes- MAX 3 Tablets.  Chest Pain   Omeprazole Prilosec 20 MG Take 20 MG by mouth twice daily.  GERD or Acid Reflux   Zinc  50 MG Take one tablet daily.       Nelson Feldman, LOBITON, RN, PHN  [place] [facility]  September 26, 2022

## 2022-09-26 NOTE — TELEPHONE ENCOUNTER
Pending Prescriptions:                       Disp   Refills    gabapentin (NEURONTIN) 300 MG capsule      270 ca*1        Sig: Take 1 capsule (300 mg) by mouth 3 times daily        Routing refill request to provider for review/approval because:  Drug not on the [facility] refill protocol.   Patient has been out of this medication. He is willing to re-start medication for his tremors. Please send new RX.   Last seen on 09/23/22.     LOBITO PickensN, RN, PHN  [place] [facility]  September 26, 2022

## 2022-09-27 DIAGNOSIS — I20.0 UNSTABLE ANGINA (H): ICD-10-CM

## 2022-09-27 DIAGNOSIS — E78.5 HYPERLIPIDEMIA LDL GOAL <100: ICD-10-CM

## 2022-09-27 DIAGNOSIS — G25.0 ESSENTIAL TREMOR: ICD-10-CM

## 2022-09-27 RX ORDER — METOPROLOL SUCCINATE 50 MG/1
50 TABLET, EXTENDED RELEASE ORAL DAILY
Qty: 90 TABLET | Refills: 1 | Status: SHIPPED | OUTPATIENT
Start: 2022-09-27 | End: 2023-03-07

## 2022-09-27 NOTE — TELEPHONE ENCOUNTER
Pending Prescriptions:                       Disp   Refills    metoprolol succinate ER (TOPROL XL) 50 MG*90 tab*1            Sig: Take 1 tablet (50 mg) by mouth daily      Requested Pharmacy: Georgette Carrington's last office visit: 04/21/2022  Next scheduled office visit: 12/07/2022      Per the RN/LPN medication refill protocol, writer is unable to refill this request.     Araceli Dan LPN

## 2022-09-27 NOTE — TELEPHONE ENCOUNTER
Authorized per medication refill protocol.     Melodie Rodrigues, RN Care Coordinator   Neurology/Neurosurgery/PM&R/ Pain Management

## 2022-09-28 RX ORDER — GABAPENTIN 300 MG/1
300 CAPSULE ORAL 3 TIMES DAILY
Qty: 270 CAPSULE | Refills: 1 | Status: SHIPPED | OUTPATIENT
Start: 2022-09-28 | End: 2022-10-12

## 2022-10-05 ENCOUNTER — MYC MEDICAL ADVICE (OUTPATIENT)
Dept: FAMILY MEDICINE | Facility: CLINIC | Age: 67
End: 2022-10-05

## 2022-10-05 NOTE — TELEPHONE ENCOUNTER
Please schedule follow-up in clinic next week.    Vinay Garcia MD, FAAFP  Family Medicine Physician  [facility] [place]  [address], [place] [address]

## 2022-10-12 ENCOUNTER — OFFICE VISIT (OUTPATIENT)
Dept: FAMILY MEDICINE | Facility: CLINIC | Age: 67
End: 2022-10-12
Payer: COMMERCIAL

## 2022-10-12 VITALS
BODY MASS INDEX: 32.5 KG/M2 | HEIGHT: 70 IN | WEIGHT: 227 LBS | TEMPERATURE: 97.2 F | DIASTOLIC BLOOD PRESSURE: 82 MMHG | OXYGEN SATURATION: 98 % | SYSTOLIC BLOOD PRESSURE: 136 MMHG | HEART RATE: 61 BPM | RESPIRATION RATE: 18 BRPM

## 2022-10-12 DIAGNOSIS — G89.29 CHRONIC LEFT-SIDED LOW BACK PAIN WITH LEFT-SIDED SCIATICA: Primary | ICD-10-CM

## 2022-10-12 DIAGNOSIS — G25.0 ESSENTIAL TREMOR: ICD-10-CM

## 2022-10-12 DIAGNOSIS — M54.42 CHRONIC LEFT-SIDED LOW BACK PAIN WITH LEFT-SIDED SCIATICA: Primary | ICD-10-CM

## 2022-10-12 PROCEDURE — 99213 OFFICE O/P EST LOW 20 MIN: CPT | Performed by: FAMILY MEDICINE

## 2022-10-12 RX ORDER — GABAPENTIN 300 MG/1
CAPSULE ORAL
Qty: 270 CAPSULE | Refills: 1 | Status: SHIPPED | DISCHARGE
Start: 2022-10-12 | End: 2022-12-07

## 2022-10-12 ASSESSMENT — PAIN SCALES - GENERAL: PAINLEVEL: SEVERE PAIN (7)

## 2022-10-12 NOTE — PROGRESS NOTES
Assessment & Plan     Chronic left-sided low back pain with left-sided sciatica  66 year old male presents to clinic for a for left lower back pain that began 5 weeks ago. Patient was last seen on 09/23/22 for an annual wellness exam. Symptoms were discussed at that visit and MRI was ordered, which is scheduled for Monday October 17th, 2022. Prior to his last visit the patient was seen on January 18, 2022. Patient was advised to go to PT, which he did and then symptoms improved. Patient was also prescribed diclofenec 1% topical gel and Tizanidine 2 mg. Patient is currently using the diclofenac 1 5 topical gel, but doesn't seem to have much relief.     Since last visit the patient's pain has worsen. He describes the pain as being a sharp electric shooting pain that at times can make him feel like his leg is going to give out or he is going to fall. Pain when at worse is 9/10, during sitting 0/10. Pain only presents when the patient stands up, walking, and when lifting things. Initially the pain started in the lower right back, then the second week the pain started to extend down to the patient's gluteal muscle. During this time the patient was unable to stand. During the third week the pain began to feel like a knife was being stabbed into his left gluteal muscle. Week four and five the pain in the lower left back has subsided. Pain is located posterior bilaterally on the legs down behind the knees. Patient has not been doing anything at home to help with pain. Patient advised he can use ice/heat and rest. Patient Gabapentin was increased.   - gabapentin (NEURONTIN) 300 MG capsule; Take 2 capsules (600 mg) by mouth every morning AND 1 capsule (300 mg) daily (with lunch) AND 2 capsules (600 mg) At Bedtime.    Essential tremor  Patient tolerating current medication regimen. Will continue to follow.   - gabapentin (NEURONTIN) 300 MG capsule; Take 2 capsules (600 mg) by mouth every morning AND 1 capsule (300 mg) daily  "(with lunch) AND 2 capsules (600 mg) At Bedtime.    No follow-ups on file.     SOTERO Pickens MD  [facility] SHAMEKA Major is a 66 year old, presenting for the following health issues:  Back Pain      History of Present Illness       Back Pain:  He presents for follow up of back pain. Patient's back pain is a recurring problem.  Location of back pain:  Left lower back  Description of back pain: burning, cramping, sharp and stabbing  Back pain spreads: left buttocks, right thigh and left thigh    Since patient first noticed back pain, pain is: gradually improving  Does back pain interfere with his job:  No      He eats 2-3 servings of fruits and vegetables daily.He exercises with enough effort to increase his heart rate 10 to 19 minutes per day.  He exercises with enough effort to increase his heart rate 6 days per week.   He is taking medications regularly.      Review of Systems   Constitutional, HEENT, cardiovascular, pulmonary, gi and gu systems are negative, except as otherwise noted.      Objective    /82 (BP Location: Left arm, Patient Position: Chair, Cuff Size: Adult Regular)   Pulse 61   Temp 97.2  F (36.2  C) (Temporal)   Resp 18   Ht 1.778 m (5' 10\")   Wt 103 kg (227 lb)   SpO2 98%   BMI 32.57 kg/m    Body mass index is 32.57 kg/m .  Physical Exam   GENERAL: healthy, alert and no distress  NECK: no adenopathy, no asymmetry, masses, or scars and thyroid normal to palpation  RESP: lungs clear to auscultation - no rales, rhonchi or wheezes  CV: regular rate and rhythm, normal S1 S2, no S3 or S4, no murmur, click or rub, no peripheral edema and peripheral pulses strong  MS: no gross musculoskeletal defects noted, no edema    Pending MRI examination and results.             SOTERO Pickens      Patient seen and examined with nurse practitioner student.  Agree with note above    Vinay Garcia MD, FAAFP  Family Medicine " Physician  [facility] [place]  [address], [place] [address]

## 2022-10-17 ENCOUNTER — ANCILLARY PROCEDURE (OUTPATIENT)
Dept: MRI IMAGING | Facility: CLINIC | Age: 67
End: 2022-10-17
Attending: FAMILY MEDICINE
Payer: COMMERCIAL

## 2022-10-17 DIAGNOSIS — M54.42 CHRONIC LEFT-SIDED LOW BACK PAIN WITH LEFT-SIDED SCIATICA: ICD-10-CM

## 2022-10-17 DIAGNOSIS — G89.29 CHRONIC LEFT-SIDED LOW BACK PAIN WITH LEFT-SIDED SCIATICA: ICD-10-CM

## 2022-10-17 PROCEDURE — 72148 MRI LUMBAR SPINE W/O DYE: CPT | Mod: GC | Performed by: STUDENT IN AN ORGANIZED HEALTH CARE EDUCATION/TRAINING PROGRAM

## 2022-10-18 ENCOUNTER — MYC MEDICAL ADVICE (OUTPATIENT)
Dept: FAMILY MEDICINE | Facility: CLINIC | Age: 67
End: 2022-10-18

## 2022-10-18 DIAGNOSIS — G89.29 CHRONIC LEFT-SIDED LOW BACK PAIN WITH LEFT-SIDED SCIATICA: Primary | ICD-10-CM

## 2022-10-18 DIAGNOSIS — M54.42 CHRONIC LEFT-SIDED LOW BACK PAIN WITH LEFT-SIDED SCIATICA: Primary | ICD-10-CM

## 2022-10-18 DIAGNOSIS — D17.79 EPIDURAL LIPOMATOSIS: ICD-10-CM

## 2022-10-18 DIAGNOSIS — M54.17 LUMBOSACRAL RADICULOPATHY AT L5: ICD-10-CM

## 2022-10-18 NOTE — TELEPHONE ENCOUNTER
MRI results present. Routing to PCP.    Nava Kauffman, LOBITON, RN, PHN  Registered Nurse-Clinic Triage  [facility]  10/18/2022 at 2:20 PM

## 2022-10-24 NOTE — TELEPHONE ENCOUNTER
SPINE PATIENTS - NEW PROTOCOL PREVISIT    RECORDS RECEIVED FROM: Internal   REASON FOR VISIT:   Chronic left-sided low back pain with left-sided sciatica   Epidural lipomatosis   Lumbosacral radiculopathy at L5      Date of Appt: 10/27/2022   NOTES (FOR ALL VISITS) STATUS DETAILS   OFFICE NOTE from referring provider Internal 10/18/2022 Dr Garcia [facility] mychart message   OFFICE NOTE from other specialist Internal 02/04/2022 PT [facility]    DISCHARGE SUMMARY from hospital N/A    DISCHARGE REPORT from ER N/A    EMG REPORT N/A    MEDICATION LIST N/A    IMAGING  (FOR ALL VISITS)     MRI (HEAD, NECK, SPINE) Internal 10/17/2022 lumbar spine   XRAY (SPINE) *NEUROSURGERY* Internal 09/16/2021 lumbar spine   CT (HEAD, NECK, SPINE) N/A

## 2022-10-25 DIAGNOSIS — M54.41 LEFT-SIDED LOW BACK PAIN WITH BILATERAL SCIATICA, UNSPECIFIED CHRONICITY: Primary | ICD-10-CM

## 2022-10-25 DIAGNOSIS — M54.42 LEFT-SIDED LOW BACK PAIN WITH BILATERAL SCIATICA, UNSPECIFIED CHRONICITY: Primary | ICD-10-CM

## 2022-10-26 ENCOUNTER — TELEPHONE (OUTPATIENT)
Dept: NEUROSURGERY | Facility: CLINIC | Age: 67
End: 2022-10-26

## 2022-10-26 NOTE — TELEPHONE ENCOUNTER
Left Voicemail (1st Attempt) for the patient to call back and schedule the following:    Appointment type: New  Provider: Dr. Julian  Return date: next available(11/4/2022?)  Specialty phone number: [phone]  Additional appointment(s) needed: yes  Additonal Notes: Dr. Julian is not available on 10/27/2022 due to emergency surgery. Need to reschedule the xrays and appointment with , possibly to 11/4/2022.    Sent a [employer] message as well.    [facility]   Neurology, NeuroSurgery, NeuroPsychology and Pain Management Specialties  [phone]

## 2022-10-27 ENCOUNTER — PRE VISIT (OUTPATIENT)
Dept: NEUROSURGERY | Facility: CLINIC | Age: 67
End: 2022-10-27

## 2022-10-27 NOTE — TELEPHONE ENCOUNTER
2nd attempt, no VM/no contact, for the patient to call back and schedule the following:    Appointment type: New  Provider: Dr. Julian  Return date: next available  Specialty phone number: [phone]  Additional appointment(s) needed: yes  Additonal Notes: Patient will need xrays priori to the appointment with Dr. Julian.    [facility]   Neurology, NeuroSurgery, NeuroPsychology and Pain Management Specialties  [phone]

## 2022-11-17 ENCOUNTER — OFFICE VISIT (OUTPATIENT)
Dept: NEUROSURGERY | Facility: CLINIC | Age: 67
End: 2022-11-17
Attending: FAMILY MEDICINE
Payer: COMMERCIAL

## 2022-11-17 VITALS
HEART RATE: 63 BPM | DIASTOLIC BLOOD PRESSURE: 65 MMHG | BODY MASS INDEX: 32.14 KG/M2 | WEIGHT: 224 LBS | SYSTOLIC BLOOD PRESSURE: 109 MMHG

## 2022-11-17 DIAGNOSIS — M54.17 LUMBOSACRAL RADICULOPATHY AT L5: ICD-10-CM

## 2022-11-17 DIAGNOSIS — G89.29 CHRONIC LEFT-SIDED LOW BACK PAIN WITH LEFT-SIDED SCIATICA: ICD-10-CM

## 2022-11-17 DIAGNOSIS — D17.79 EPIDURAL LIPOMATOSIS: ICD-10-CM

## 2022-11-17 DIAGNOSIS — M54.42 CHRONIC LEFT-SIDED LOW BACK PAIN WITH LEFT-SIDED SCIATICA: ICD-10-CM

## 2022-11-17 PROCEDURE — 99203 OFFICE O/P NEW LOW 30 MIN: CPT | Performed by: PHYSICIAN ASSISTANT

## 2022-11-17 NOTE — LETTER
11/17/2022         RE: Pedrito Negron  [address]  [place] [address]        Dear Colleague,    Thank you for referring your patient, Pedrito Negron, to the [facility] [place]. Please see a copy of my visit note below.    Neurosurgery Consult    HPI    Mr. Negron is a 66-year-old male who presents to clinic for evaluation of back pain and bilateral lower extremity radiculopathy and neurogenic claudication.  He states his symptoms began about 6 weeks ago initially was very painful he was having shooting radicular pains down his legs and with difficulty standing.  But over the last 2 to 3 weeks his symptoms have largely resolved and is feeling much better now.  He has been able to get back to work and is walking without issue.  He is very pleased that his symptoms are spontaneously resolved.    Medical history  Obesity  Hyperlipidemia  ST elevation MI  Hypertension  History of alcohol abuse, currently sober    Social history  Works as a       B/P: 109/65, T: Data Unavailable, P: 63, R: Data Unavailable       Exam    Alert and oriented no acute distress  Bilateral lower extremities with 5/5 strength  Reflexes 2+ patella/ankle  Lumbar spine nontender to palpation  Gait is normal    Imaging    IMPRESSION:  Multilevel lumbar spondylosis and epidural lipomatosis, most  significantly resulting in severe concentric thecal sac stenosis with  at least moderate degree spinal canal narrowing at L2-3 and L3-4  resulting in clumping of cauda equina fibers. Additionally, there is   narrowing of the right lateral recess at L4-5 resulting in abutment of  the descending right L5 nerve root.       Assessment    Lumbar stenosis due to spondylosis and epidural lipomatosis without neurogenic claudication at this time    Plan:      Patient can continue conservative therapies and follow-up of his symptoms are worsening.          Again, thank you for allowing me to  participate in the care of your patient.        Sincerely,        Brenda Tran PA-C     Alert and oriented to person, place and time

## 2022-11-17 NOTE — PROGRESS NOTES
Neurosurgery Consult    HPI    Mr. Negron is a 66-year-old male who presents to clinic for evaluation of back pain and bilateral lower extremity radiculopathy and neurogenic claudication.  He states his symptoms began about 6 weeks ago initially was very painful he was having shooting radicular pains down his legs and with difficulty standing.  But over the last 2 to 3 weeks his symptoms have largely resolved and is feeling much better now.  He has been able to get back to work and is walking without issue.  He is very pleased that his symptoms are spontaneously resolved.    Medical history  Obesity  Hyperlipidemia  ST elevation MI  Hypertension  History of alcohol abuse, currently sober    Social history  Works as a       B/P: 109/65, T: Data Unavailable, P: 63, R: Data Unavailable       Exam    Alert and oriented no acute distress  Bilateral lower extremities with 5/5 strength  Reflexes 2+ patella/ankle  Lumbar spine nontender to palpation  Gait is normal    Imaging    IMPRESSION:  Multilevel lumbar spondylosis and epidural lipomatosis, most  significantly resulting in severe concentric thecal sac stenosis with  at least moderate degree spinal canal narrowing at L2-3 and L3-4  resulting in clumping of cauda equina fibers. Additionally, there is   narrowing of the right lateral recess at L4-5 resulting in abutment of  the descending right L5 nerve root.       Assessment    Lumbar stenosis due to spondylosis and epidural lipomatosis without neurogenic claudication at this time    Plan:      Patient can continue conservative therapies and follow-up of his symptoms are worsening.

## 2022-12-07 ENCOUNTER — OFFICE VISIT (OUTPATIENT)
Dept: NEUROLOGY | Facility: CLINIC | Age: 67
End: 2022-12-07
Payer: COMMERCIAL

## 2022-12-07 VITALS
SYSTOLIC BLOOD PRESSURE: 109 MMHG | DIASTOLIC BLOOD PRESSURE: 66 MMHG | HEART RATE: 63 BPM | HEIGHT: 70 IN | WEIGHT: 224 LBS | BODY MASS INDEX: 32.07 KG/M2

## 2022-12-07 DIAGNOSIS — M54.42 CHRONIC LEFT-SIDED LOW BACK PAIN WITH LEFT-SIDED SCIATICA: ICD-10-CM

## 2022-12-07 DIAGNOSIS — G25.0 ESSENTIAL TREMOR: ICD-10-CM

## 2022-12-07 DIAGNOSIS — M79.2 NERVE PAIN: Primary | ICD-10-CM

## 2022-12-07 DIAGNOSIS — G89.29 CHRONIC LEFT-SIDED LOW BACK PAIN WITH LEFT-SIDED SCIATICA: ICD-10-CM

## 2022-12-07 DIAGNOSIS — G62.9 LENGTH-DEPENDENT PERIPHERAL NEUROPATHY: ICD-10-CM

## 2022-12-07 PROCEDURE — 99215 OFFICE O/P EST HI 40 MIN: CPT | Performed by: STUDENT IN AN ORGANIZED HEALTH CARE EDUCATION/TRAINING PROGRAM

## 2022-12-07 RX ORDER — GABAPENTIN 300 MG/1
CAPSULE ORAL
Qty: 270 CAPSULE | Refills: 1 | Status: SHIPPED | OUTPATIENT
Start: 2022-12-07 | End: 2023-09-27

## 2022-12-07 RX ORDER — NORTRIPTYLINE HCL 25 MG
25 CAPSULE ORAL AT BEDTIME
Qty: 90 CAPSULE | Refills: 1 | Status: SHIPPED | OUTPATIENT
Start: 2022-12-07 | End: 2023-06-08

## 2022-12-07 RX ORDER — PERPHENAZINE 16 MG
600 TABLET ORAL DAILY
Qty: 90 CAPSULE | Refills: 1 | Status: SHIPPED | OUTPATIENT
Start: 2022-12-07 | End: 2023-06-08

## 2022-12-07 ASSESSMENT — PAIN SCALES - GENERAL: PAINLEVEL: NO PAIN (0)

## 2022-12-07 NOTE — PROGRESS NOTES
"[facility]/[place]  Section of General Neurology  Return Patient Visit    Pedrito Negron MRN# [id number]   Age: 67 year old YOB: 1955            Assessment and Plan:   Assessment:  Pedrito Negron is a very pleasant 67 year old man seen in follow up for essential tremor.  I think his tremor is quite stable today and he agrees.  We discussed his recent low back MRI and this was reviewed with him as well.  I concur that if this worsens he should again see our spine team/Brenda Tran.   Discussed his concern for neuropathy.  I do think to exam he has a very mild length dependent neuropathy to what I can detect to exam.  I think this is likely secondary to his history of etoh abuse which we discussed.  Best thing to do: not develop diabetes, not resume drinking.  We can continue to monitor this over time.  Should this worsen would recommend EMG, though deficits so mild right now unclear what this would show.  Could help us further parse out what could be from neuropathy vs radiculopathy too if so.      Plan:  --Resume gabapentin at previous dose 600-300-600  --Addition of nortriptyline 25 mg at bedtime to help with nerve discomfort at night  --Continue metoprolol at current dose, tremor stable  --Discussed alpha lipoic acid data, potential promise, could trial at 600 mg daily.    --Follow up with me ~6 months, sooner with issues or questions       Bobby Daniels MD   of Neurology   [facility]      Interval history:     Increased gabapentin to 600-300-600 by primary.    Discussed his spine MRI, what to do if worsens/game plan.  He is working on losing weight  Wants to develop a good exercise routine.  May do this through chiropractor.    Is worried he is developing a neuropathy.  They are tingly and numb.  Feet feel strange at times, does alter sleep at times as well.   Previously drank \"a lot\".  Now off completely.  No longer smokes as " well.    Tremor is doing great.  Still present as below but looks stable to me, he agrees.   Enjoying his golf simulator.        Plan at last visit:  --Increase metoprolol to 50 mg daily  --continue gabapentin 300 mg TID  --Imdur, atorvastatin, zetia filled for him as well.   --He will follow up with me in 6 months         Past Medical History:     Patient Active Problem List   Diagnosis     Coronary atherosclerosis     H/O acute myocardial infarction     H/O diverticulitis of colon     H/O ETOH abuse     STEMI (ST elevation myocardial infarction) (H)     Hyperlipidemia LDL goal <100     Essential hypertension     Morbid obesity (H)     Chronic left-sided low back pain without sciatica     Past Medical History:   Diagnosis Date     Diverticulitis      Hypertension         Past Surgical History:     Past Surgical History:   Procedure Laterality Date     COLONOSCOPY WITH CO2 INSUFFLATION N/A 2020    Procedure: COLONOSCOPY, WITH CO2 INSUFFLATION;  Surgeon: Melinda Saleem MD;  Location: [facility] OR     large bowel resection      diverticulitis     STENT      cardiac        Social History:     Social History     Tobacco Use     Smoking status: Former     Types: Cigarettes     Quit date:      Years since quittin.9     Smokeless tobacco: Never   Vaping Use     Vaping Use: Never used   Substance Use Topics     Alcohol use: Yes     Comment: 7 beers per week      Drug use: Never        Family History:     Family History   Problem Relation Age of Onset     Diabetes Sister         Medications:     Current Outpatient Medications   Medication Sig     aspirin (ASA) 81 MG EC tablet Take 81 mg by mouth daily     atorvastatin (LIPITOR) 80 MG tablet TAKE 1 TABLET(80 MG) BY MOUTH DAILY     Cholecalciferol (VITAMIN D3 PO) Take by mouth daily     ezetimibe (ZETIA) 10 MG tablet Take 1 tablet (10 mg) by mouth daily     gabapentin (NEURONTIN) 300 MG capsule Take 2 capsules (600 mg) by mouth every morning AND 1 capsule (300  "mg) daily (with lunch) AND 2 capsules (600 mg) At Bedtime.     Icosapent Ethyl (VASCEPA) 1 g CAPS Take 2 g by mouth 2 times daily (Patient taking differently: Take 1 g by mouth daily)     isosorbide mononitrate (IMDUR) 30 MG 24 hr tablet Take 1 tablet (30 mg) by mouth daily     lisinopril (ZESTRIL) 30 MG tablet Take 1 tablet (30 mg) by mouth daily     metoprolol succinate ER (TOPROL XL) 50 MG 24 hr tablet Take 1 tablet (50 mg) by mouth daily     Multiple Vitamin (MULTI-VITAMINS) TABS Take 1 tablet by mouth daily Centrum Men's 50 +     nitroGLYcerin (NITROSTAT) 0.4 MG sublingual tablet ONE TABLET UNDER TONGUE AS NEEDED FOR CHEST PAIN- IF NO RELIEF AFTER 5 MINUTES CALL 911; USE 1 TABLET EVERY 5 MINUTES- MAX OF 3 TABLETS     omeprazole (PRILOSEC) 20 MG DR capsule Take 20 mg by mouth 2 times daily     ZINC PICOLINATE PO Take 50 mg by mouth Take one tablet daily.     No current facility-administered medications for this visit.        Allergies:   No Known Allergies       Physical Exam:   Vitals: /66 (BP Location: Right arm, Patient Position: Sitting, Cuff Size: Adult Regular)   Pulse 63   Ht 1.778 m (5' 10\")   Wt 101.6 kg (224 lb)   BMI 32.14 kg/m     CV: peripheral pulse appreciated  Lungs: breathing comfortably  Extremities: no edema    Neuro:   General Appearance: No apparent distress, well-nourished, well-groomed, pleasant     Mental Status: Alert and oriented to person, place, and time. Speech fluent and comprehension intact. No dysarthria.    Cranial Nerves:   II: Visual fields: normal  III: Pupils: 3 mm, equal, round, reactive to light   III,IV,VI: Extraocular Movements: intact   V: Facial sensation: intact to light touch  VII: Facial strength: intact without asymmetry  VIII: Hearing: intact grossly  IX: Palate: intact        Motor Exam:   5/5 diffusely    Fine b/l action tremor is present. Tone is normal throughout.    Sensory: slightly diminished to LT up to lower shin b/l, vibration worse on L than R " also slightly diminished at great toe>ankle.     Coordination: no dysmetria with finger-to-nose bilaterally    Reflexes: biceps, triceps, brachioradialis, patellar, and ankle jerks 1+ and symmetric. Toes are downgoing bilaterally           Data: Pertinent prior to visit     Imaging--10/2022 MRI L spine     IMPRESSION:  Multilevel lumbar spondylosis and epidural lipomatosis, most  significantly resulting in severe concentric thecal sac stenosis with  at least moderate degree spinal canal narrowing at L2-3 and L3-4  resulting in clumping of cauda equina fibers. Additionally, there is   narrowing of the right lateral recess at L4-5 resulting in abutment of  the descending right L5 nerve root.      Lab:  Lab Results   Component Value Date    A1C 5.6 06/17/2022    A1C 5.1 02/19/2020     B12 WNL in 2021         The total time of this encounter today amounted to 40 minutes. This time included time spent with the patient, prep work, ordering tests, and performing post visit documentation.

## 2022-12-07 NOTE — NURSING NOTE
"Pedrito Negron's goals for this visit include:   Chief Complaint   Patient presents with     RECHECK     Essential tremor//6 months(resched from 10/27)       He requests these members of his care team be copied on today's visit information: yes    PCP: Vinay Garcia    Referring Provider:  No referring provider defined for this encounter.    /66 (BP Location: Right arm, Patient Position: Sitting, Cuff Size: Adult Regular)   Pulse 63   Ht 1.778 m (5' 10\")   Wt 101.6 kg (224 lb)   BMI 32.14 kg/m      Do you need any medication refills at today's visit? Yes  SKYLER Hughes, SIDDHARTHA ([facility])      "

## 2022-12-07 NOTE — PATIENT INSTRUCTIONS
Nerve pain options:  Alpha lipoic acid 600 mg (can be obtained OTC)--promising/interesting data    Continuing to not drink, not develop diabetes will be most vital.    Drinking history could be related to nerve changes, should it worsen I would push for that EMG.     Gabapentin is my first line choice, think about adding cymbalta, nortriptyline.    Lets in fact add nortriptyline    --Start nortryptiline 25 mg at night  Discussed common side effects--fatigue, dry mouth, and less common side effects: constipation, arrhythmia among other side effects

## 2022-12-07 NOTE — LETTER
12/7/2022         RE: Pedrito Negron  [address]  [place] [address]        Dear Colleague,    Thank you for referring your patient, Pedrito Negron, to the [facility] [place]. Please see a copy of my visit note below.    [facility]/[place]  Section of General Neurology  Return Patient Visit    Pedrito Negron MRN# [id number]   Age: 67 year old YOB: 1955            Assessment and Plan:   Assessment:  Pedrito Negron is a very pleasant 67 year old man seen in follow up for essential tremor.  I think his tremor is quite stable today and he agrees.  We discussed his recent low back MRI and this was reviewed with him as well.  I concur that if this worsens he should again see our spine team/Brenda Tran.   Discussed his concern for neuropathy.  I do think to exam he has a very mild length dependent neuropathy to what I can detect to exam.  I think this is likely secondary to his history of etoh abuse which we discussed.  Best thing to do: not develop diabetes, not resume drinking.  We can continue to monitor this over time.  Should this worsen would recommend EMG, though deficits so mild right now unclear what this would show.  Could help us further parse out what could be from neuropathy vs radiculopathy too if so.      Plan:  --Resume gabapentin at previous dose 600-300-600  --Addition of nortriptyline 25 mg at bedtime to help with nerve discomfort at night  --Continue metoprolol at current dose, tremor stable  --Discussed alpha lipoic acid data, potential promise, could trial at 600 mg daily.    --Follow up with me ~6 months, sooner with issues or questions       Bobby Daniels MD   of Neurology   [facility]      Interval history:     Increased gabapentin to 600-300-600 by primary.    Discussed his spine MRI, what to do if worsens/game plan.  He is working on losing weight  Wants to develop a good exercise  "routine.  May do this through chiropractor.    Is worried he is developing a neuropathy.  They are tingly and numb.  Feet feel strange at times, does alter sleep at times as well.   Previously drank \"a lot\".  Now off completely.  No longer smokes as well.    Tremor is doing great.  Still present as below but looks stable to me, he agrees.   Enjoying his golf simulator.        Plan at last visit:  --Increase metoprolol to 50 mg daily  --continue gabapentin 300 mg TID  --Imdur, atorvastatin, zetia filled for him as well.   --He will follow up with me in 6 months         Past Medical History:     Patient Active Problem List   Diagnosis     Coronary atherosclerosis     H/O acute myocardial infarction     H/O diverticulitis of colon     H/O ETOH abuse     STEMI (ST elevation myocardial infarction) (H)     Hyperlipidemia LDL goal <100     Essential hypertension     Morbid obesity (H)     Chronic left-sided low back pain without sciatica     Past Medical History:   Diagnosis Date     Diverticulitis      Hypertension         Past Surgical History:     Past Surgical History:   Procedure Laterality Date     COLONOSCOPY WITH CO2 INSUFFLATION N/A 2020    Procedure: COLONOSCOPY, WITH CO2 INSUFFLATION;  Surgeon: Melinda Saleem MD;  Location: [facility] OR     large bowel resection      diverticulitis     STENT  2018    cardiac        Social History:     Social History     Tobacco Use     Smoking status: Former     Types: Cigarettes     Quit date:      Years since quittin.9     Smokeless tobacco: Never   Vaping Use     Vaping Use: Never used   Substance Use Topics     Alcohol use: Yes     Comment: 7 beers per week      Drug use: Never        Family History:     Family History   Problem Relation Age of Onset     Diabetes Sister         Medications:     Current Outpatient Medications   Medication Sig     aspirin (ASA) 81 MG EC tablet Take 81 mg by mouth daily     atorvastatin (LIPITOR) 80 MG tablet TAKE 1 TABLET(80 MG) BY " "MOUTH DAILY     Cholecalciferol (VITAMIN D3 PO) Take by mouth daily     ezetimibe (ZETIA) 10 MG tablet Take 1 tablet (10 mg) by mouth daily     gabapentin (NEURONTIN) 300 MG capsule Take 2 capsules (600 mg) by mouth every morning AND 1 capsule (300 mg) daily (with lunch) AND 2 capsules (600 mg) At Bedtime.     Icosapent Ethyl (VASCEPA) 1 g CAPS Take 2 g by mouth 2 times daily (Patient taking differently: Take 1 g by mouth daily)     isosorbide mononitrate (IMDUR) 30 MG 24 hr tablet Take 1 tablet (30 mg) by mouth daily     lisinopril (ZESTRIL) 30 MG tablet Take 1 tablet (30 mg) by mouth daily     metoprolol succinate ER (TOPROL XL) 50 MG 24 hr tablet Take 1 tablet (50 mg) by mouth daily     Multiple Vitamin (MULTI-VITAMINS) TABS Take 1 tablet by mouth daily Centrum Men's 50 +     nitroGLYcerin (NITROSTAT) 0.4 MG sublingual tablet ONE TABLET UNDER TONGUE AS NEEDED FOR CHEST PAIN- IF NO RELIEF AFTER 5 MINUTES CALL 911; USE 1 TABLET EVERY 5 MINUTES- MAX OF 3 TABLETS     omeprazole (PRILOSEC) 20 MG DR capsule Take 20 mg by mouth 2 times daily     ZINC PICOLINATE PO Take 50 mg by mouth Take one tablet daily.     No current facility-administered medications for this visit.        Allergies:   No Known Allergies       Physical Exam:   Vitals: /66 (BP Location: Right arm, Patient Position: Sitting, Cuff Size: Adult Regular)   Pulse 63   Ht 1.778 m (5' 10\")   Wt 101.6 kg (224 lb)   BMI 32.14 kg/m     CV: peripheral pulse appreciated  Lungs: breathing comfortably  Extremities: no edema    Neuro:   General Appearance: No apparent distress, well-nourished, well-groomed, pleasant     Mental Status: Alert and oriented to person, place, and time. Speech fluent and comprehension intact. No dysarthria.    Cranial Nerves:   II: Visual fields: normal  III: Pupils: 3 mm, equal, round, reactive to light   III,IV,VI: Extraocular Movements: intact   V: Facial sensation: intact to light touch  VII: Facial strength: intact without " asymmetry  VIII: Hearing: intact grossly  IX: Palate: intact        Motor Exam:   5/5 diffusely    Fine b/l action tremor is present. Tone is normal throughout.    Sensory: slightly diminished to LT up to lower shin b/l, vibration worse on L than R also slightly diminished at great toe>ankle.     Coordination: no dysmetria with finger-to-nose bilaterally    Reflexes: biceps, triceps, brachioradialis, patellar, and ankle jerks 1+ and symmetric. Toes are downgoing bilaterally           Data: Pertinent prior to visit     Imaging--10/2022 MRI L spine     IMPRESSION:  Multilevel lumbar spondylosis and epidural lipomatosis, most  significantly resulting in severe concentric thecal sac stenosis with  at least moderate degree spinal canal narrowing at L2-3 and L3-4  resulting in clumping of cauda equina fibers. Additionally, there is   narrowing of the right lateral recess at L4-5 resulting in abutment of  the descending right L5 nerve root.      Lab:  Lab Results   Component Value Date    A1C 5.6 06/17/2022    A1C 5.1 02/19/2020     B12 WNL in 2021         The total time of this encounter today amounted to 40 minutes. This time included time spent with the patient, prep work, ordering tests, and performing post visit documentation.        Again, thank you for allowing me to participate in the care of your patient.        Sincerely,        Juan A Daniels MD

## 2022-12-20 ENCOUNTER — MYC MEDICAL ADVICE (OUTPATIENT)
Dept: PEDIATRICS | Facility: CLINIC | Age: 67
End: 2022-12-20

## 2022-12-20 NOTE — TELEPHONE ENCOUNTER
Left Voicemail (2nd Attempt) for the patient to call back and schedule the following:    Appointment type: Return  Provider:   Return date: 6/17/2023  Specialty phone number: [phone]    Additonal Notes: Return in about 1 year (around 6/17/2023).        Frida guillen Procedure   Cardiology, Nephrology, Rheumatology, GI, Pulmonology, Infectious Disease Specialties   [facility]

## 2023-01-16 ENCOUNTER — MYC MEDICAL ADVICE (OUTPATIENT)
Dept: NEUROLOGY | Facility: CLINIC | Age: 68
End: 2023-01-16

## 2023-01-16 DIAGNOSIS — M79.2 NERVE PAIN: ICD-10-CM

## 2023-01-17 RX ORDER — NORTRIPTYLINE HYDROCHLORIDE 50 MG/1
50 CAPSULE ORAL AT BEDTIME
Qty: 90 CAPSULE | Refills: 1 | Status: SHIPPED | OUTPATIENT
Start: 2023-01-17 | End: 2023-08-24

## 2023-03-07 DIAGNOSIS — G25.0 ESSENTIAL TREMOR: ICD-10-CM

## 2023-03-07 DIAGNOSIS — I20.0 UNSTABLE ANGINA (H): ICD-10-CM

## 2023-03-07 DIAGNOSIS — E78.5 HYPERLIPIDEMIA LDL GOAL <100: ICD-10-CM

## 2023-03-07 RX ORDER — METOPROLOL SUCCINATE 50 MG/1
50 TABLET, EXTENDED RELEASE ORAL DAILY
Qty: 90 TABLET | Refills: 1 | Status: SHIPPED | OUTPATIENT
Start: 2023-03-07 | End: 2023-08-04

## 2023-03-07 NOTE — TELEPHONE ENCOUNTER
Pending Prescriptions:                       Disp   Refills    metoprolol succinate ER (TOPROL XL) 50 MG*90 tab*1            Sig: Take 1 tablet (50 mg) by mouth daily        Requested Pharmacy: [facility] in [place]    Pt's last office visit: 12/7/22  Next scheduled office visit: 6/8/23      Per the RN/LPN medication refill protocol, writer is unable to refill this request.

## 2023-04-20 DIAGNOSIS — I20.0 UNSTABLE ANGINA (H): ICD-10-CM

## 2023-04-20 DIAGNOSIS — E78.5 HYPERLIPIDEMIA LDL GOAL <100: ICD-10-CM

## 2023-04-20 DIAGNOSIS — I21.3 ST ELEVATION MYOCARDIAL INFARCTION (STEMI), UNSPECIFIED ARTERY (H): Primary | ICD-10-CM

## 2023-04-20 DIAGNOSIS — I10 ESSENTIAL HYPERTENSION: ICD-10-CM

## 2023-04-20 NOTE — TELEPHONE ENCOUNTER
lisinopril (ZESTRIL) 30 MG tablet  Last Written Prescription Date:   6/17/2022  Last Fill Quantity: 90,   # refills: 3  Last Office Visit :  6/17/2022  Future Office visit:   8/4/2023    Routing refill request to provider for review/approval because:  Abnormal Creatinine  Refer to clinic for review     Recent Labs   Lab Test 09/26/22  0850   CR 1.26*       Johnna Govea RN  Central Triage Red Flags/Med Refills

## 2023-04-20 NOTE — TELEPHONE ENCOUNTER
[facility] Call Center    Phone Message    May a detailed message be left on voicemail: yes     Reason for Call: Medication Refill Request    Has the patient contacted the pharmacy for the refill? Yes   Name of medication being requested: lisinopril (ZESTRIL) 30 MG tablet  Provider who prescribed the medication:   Pharmacy: Dio Shields MD  Date medication is needed: ASAP     Action Taken: Other: cardio    Travel Screening: Not Applicable     Thank you!  Specialty Access Center

## 2023-04-21 RX ORDER — LISINOPRIL 30 MG/1
30 TABLET ORAL DAILY
Qty: 90 TABLET | Refills: 3 | Status: SHIPPED | OUTPATIENT
Start: 2023-04-21 | End: 2023-05-02

## 2023-04-24 NOTE — TELEPHONE ENCOUNTER
Dio Shields MD  to [facility] Cardiology Adult [place]    PN      4/21/23  5:44 PM   Renewed.  Please order BMP to be done now.     BMP order placed.    Margaret Kruger RN, BSN  Cardiology RN Care Coordinator   [place]   Phone: [phone]  Fax: [phone] ([place]) [phone] ([place])

## 2023-04-25 ENCOUNTER — MYC MEDICAL ADVICE (OUTPATIENT)
Dept: CARDIOLOGY | Facility: CLINIC | Age: 68
End: 2023-04-25
Payer: COMMERCIAL

## 2023-04-25 NOTE — TELEPHONE ENCOUNTER
Dio Shields MD  to [facility] Cardiology Adult [place]    PN      4/21/23  5:44 PM   Renewed.  Please order BMP to be done now.      BMP order placed.     Margaret Kruger RN, BSN  Cardiology RN Care Coordinator   [place]   Phone: [phone]  Fax: [phone] ([place]) [phone] ([place])        [place] message sent to patient to offer scheduling lab appointment.    Margaret Kruger RN, BSN  Cardiology RN Care Coordinator   [place]   Phone: [phone]  Fax: [phone] ([facility] [place]) [phone] ([place])

## 2023-04-27 NOTE — TELEPHONE ENCOUNTER
Called and spoke to patient, Believes is insurance still covers Jeanne. He will call and schedule the non fasting lab in the next couple of weeks.    Kimberly Swartz RN  Cardiology Care Coordinator  [facility] [place]  Phone: [phone]

## 2023-05-01 ENCOUNTER — LAB (OUTPATIENT)
Dept: LAB | Facility: CLINIC | Age: 68
End: 2023-05-01
Payer: COMMERCIAL

## 2023-05-01 DIAGNOSIS — I21.3 ST ELEVATION MYOCARDIAL INFARCTION (STEMI), UNSPECIFIED ARTERY (H): ICD-10-CM

## 2023-05-01 DIAGNOSIS — I20.0 UNSTABLE ANGINA (H): ICD-10-CM

## 2023-05-01 DIAGNOSIS — I10 ESSENTIAL HYPERTENSION: ICD-10-CM

## 2023-05-01 DIAGNOSIS — E78.5 HYPERLIPIDEMIA LDL GOAL <100: ICD-10-CM

## 2023-05-01 LAB
ANION GAP SERPL CALCULATED.3IONS-SCNC: 2 MMOL/L (ref 3–14)
BUN SERPL-MCNC: 30 MG/DL (ref 7–30)
CALCIUM SERPL-MCNC: 9.8 MG/DL (ref 8.5–10.1)
CHLORIDE BLD-SCNC: 104 MMOL/L (ref 94–109)
CO2 SERPL-SCNC: 30 MMOL/L (ref 20–32)
CREAT SERPL-MCNC: 1.92 MG/DL (ref 0.66–1.25)
GFR SERPL CREATININE-BSD FRML MDRD: 37 ML/MIN/1.73M2
GLUCOSE BLD-MCNC: 95 MG/DL (ref 70–99)
POTASSIUM BLD-SCNC: 5.3 MMOL/L (ref 3.4–5.3)
SODIUM SERPL-SCNC: 136 MMOL/L (ref 133–144)

## 2023-05-01 PROCEDURE — 36415 COLL VENOUS BLD VENIPUNCTURE: CPT

## 2023-05-01 PROCEDURE — 80048 BASIC METABOLIC PNL TOTAL CA: CPT

## 2023-05-02 ENCOUNTER — TELEPHONE (OUTPATIENT)
Dept: CARDIOLOGY | Facility: CLINIC | Age: 68
End: 2023-05-02
Payer: COMMERCIAL

## 2023-05-02 DIAGNOSIS — I10 ESSENTIAL HYPERTENSION: ICD-10-CM

## 2023-05-02 RX ORDER — LISINOPRIL 30 MG/1
30 TABLET ORAL DAILY
Qty: 90 TABLET | Refills: 3 | COMMUNITY
Start: 2023-05-02 | End: 2023-06-08

## 2023-05-02 NOTE — RESULT ENCOUNTER NOTE
Cr is worse. Has he been taking NSAIDs for pain? If yes, he should stop. Hold lisinopril, once Cr improves will start at lower dose.

## 2023-05-02 NOTE — TELEPHONE ENCOUNTER
----- Message from Dio Shields MD sent at 5/2/2023  1:22 PM CDT -----  Cr is worse. Has he been taking NSAIDs for pain? If yes, he should stop. Hold lisinopril, once Cr improves will start at lower dose.

## 2023-05-02 NOTE — CONFIDENTIAL NOTE
Called and left message on personal voice mail regarding lab results from yesterday. Advised to hold lisinopril and stop any NSAIDS if he is taking. Will need to set up a recheck of his labs in a few weeks.   Will try him back and left clinic call back number.     Kimberly Swartz RN  Cardiology Care Coordinator  [facility]  Phone: [phone]

## 2023-05-10 NOTE — CONFIDENTIAL NOTE
Message sent to patient via [employer] with new recommendations from Dr Shields on 5/5/23. Order placed for renal ultrasound, see PCP, and a follow up lab in one month. Patient read message on 5/5    Kimberly Swartz RN

## 2023-06-06 NOTE — PROGRESS NOTES
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  We also discussed his MRI L spine imaging as below.  His low back symptoms are improved.    His nerve pain improved greatly with nortriptyline, this also facilitates sleep for him.  He tolerated but did not derive much benefit from gabapentin which we were trying to use to treat pain/tremor.   He is on metoprolol for cardiac/BP reasons and we discussed this may be helping his tremor as well.  To me he is doing quite well/quite stable overall neurologically.       Plan:  Continue nortriptyline 50 mg for nerve pain,  OK to stay off of gabapentin, in future lyrica (pregabalin).   To discuss with cardiology team: if propranolol as needed for tough tremor that days (20 mg twice a day as needed) would be an OK option in addition to metoprolol  Follow up in 1 year, sooner with any issues questions or changes       Bobby Daniels MD   of Neurology   [facility]      Interval history:     He is unsure how much gabapentin was helping, tried being off of it for a bit.    Discussed metoprolol as it relates to tremor  Now off of lisinopril given Creatinine rise.  Nortriptyline at 50 mg is helpful for pain.    Tolerated gabapentin, though  Tremor seems subjectively similar to improved, he agrees.   MRI L spine--re reviewed  Bought an inversion table to help with this-- is helping immensely.    Many family members with similar tremor    A/P at previous visit  Pedrito Negron is a very pleasant 67 year old man seen in follow up for essential tremor.  I think his tremor is quite stable today and he agrees.  We discussed his recent low back  MRI and this was reviewed with him as well.  I concur that if this worsens he should again see our spine team/Brenda Tran.   Discussed his concern for neuropathy.  I do think to exam he has a very mild length dependent neuropathy to what I can detect to exam.  I think this is likely secondary to his history of etoh abuse which we discussed.  Best thing to do: not develop diabetes, not resume drinking.  We can continue to monitor this over time.  Should this worsen would recommend EMG, though deficits so mild right now unclear what this would show.  Could help us further parse out what could be from neuropathy vs radiculopathy too if so.      Plan:  --Resume gabapentin at previous dose 600-300-600  --Addition of nortriptyline 25 mg at bedtime to help with nerve discomfort at night  --Continue metoprolol at current dose, tremor stable  --Discussed alpha lipoic acid data, potential promise, could trial at 600 mg daily.    --Follow up with me ~6 months, sooner with issues or questions    Past Medical History:     Patient Active Problem List   Diagnosis     Coronary atherosclerosis     H/O acute myocardial infarction     H/O diverticulitis of colon     H/O ETOH abuse     STEMI (ST elevation myocardial infarction) (H)     Hyperlipidemia LDL goal <100     Essential hypertension     Morbid obesity (H)     Chronic left-sided low back pain without sciatica     Past Medical History:   Diagnosis Date     Diverticulitis      Hypertension         Past Surgical History:     Past Surgical History:   Procedure Laterality Date     COLONOSCOPY WITH CO2 INSUFFLATION N/A 2020    Procedure: COLONOSCOPY, WITH CO2 INSUFFLATION;  Surgeon: Melinda Saleem MD;  Location: [facility] OR     large bowel resection      diverticulitis     STENT  2018    cardiac        Social History:     Social History     Tobacco Use     Smoking status: Former     Types: Cigarettes     Quit date:      Years since quittin.4     Smokeless tobacco: Never    Vaping Use     Vaping status: Never Used     Passive vaping exposure: Yes   Substance Use Topics     Alcohol use: Yes     Comment: 7 beers per week      Drug use: Never        Family History:     Family History   Problem Relation Age of Onset     Diabetes Sister         Medications:     Current Outpatient Medications   Medication Sig     alpha-lipoic acid 600 MG capsule Take 1 capsule (600 mg) by mouth daily     aspirin (ASA) 81 MG EC tablet Take 81 mg by mouth daily     atorvastatin (LIPITOR) 80 MG tablet TAKE 1 TABLET(80 MG) BY MOUTH DAILY     Cholecalciferol (VITAMIN D3 PO) Take by mouth daily     ezetimibe (ZETIA) 10 MG tablet Take 1 tablet (10 mg) by mouth daily     gabapentin (NEURONTIN) 300 MG capsule Take 2 capsules (600 mg) by mouth every morning AND 1 capsule (300 mg) daily (with lunch) AND 2 capsules (600 mg) At Bedtime.     Icosapent Ethyl (VASCEPA) 1 g CAPS Take 2 g by mouth 2 times daily (Patient taking differently: Take 1 g by mouth daily)     isosorbide mononitrate (IMDUR) 30 MG 24 hr tablet Take 1 tablet (30 mg) by mouth daily     lisinopril (ZESTRIL) 30 MG tablet Take 1 tablet (30 mg) by mouth daily 5/2-HOLD     metoprolol succinate ER (TOPROL XL) 50 MG 24 hr tablet Take 1 tablet (50 mg) by mouth daily     Multiple Vitamin (MULTI-VITAMINS) TABS Take 1 tablet by mouth daily Centrum Men's 50 +     nitroGLYcerin (NITROSTAT) 0.4 MG sublingual tablet ONE TABLET UNDER TONGUE AS NEEDED FOR CHEST PAIN- IF NO RELIEF AFTER 5 MINUTES CALL 911; USE 1 TABLET EVERY 5 MINUTES- MAX OF 3 TABLETS     nortriptyline (PAMELOR) 25 MG capsule Take 1 capsule (25 mg) by mouth At Bedtime     nortriptyline (PAMELOR) 50 MG capsule Take 1 capsule (50 mg) by mouth At Bedtime     omeprazole (PRILOSEC) 20 MG DR capsule Take 20 mg by mouth 2 times daily     ZINC PICOLINATE PO Take 50 mg by mouth Take one tablet daily.     No current facility-administered medications for this visit.        Allergies:   No Known Allergies        "Physical Exam:   Vitals: /77   Pulse 84   Ht 1.791 m (5' 10.5\")   Wt 102.1 kg (225 lb)   BMI 31.83 kg/m        Neuro:   General Appearance: No apparent distress, well-nourished, well-groomed, pleasant      Mental Status: Alert and oriented to person, place, and time. Speech fluent and comprehension intact. No dysarthria.     Cranial Nerves:   II: Visual fields: normal  III: Pupils: 3 mm, equal, round, reactive to light   III,IV,VI: Extraocular Movements: intact   V: Facial sensation: intact to light touch  VII: Facial strength: intact without asymmetry  VIII: Hearing: intact grossly  IX: Palate: intact         Motor Exam:   5/5 diffusely     Fine b/l action tremor is present. Tone is normal throughout.     Sensory: intact to LT/PP/vibration today     Coordination: no dysmetria with finger-to-nose bilaterally     Reflexes: biceps, triceps, brachioradialis, patellar, and ankle jerks 1+ and symmetric.         Data: Pertinent prior to visit   Imaging--10/2022 MRI L spine     IMPRESSION:  Multilevel lumbar spondylosis and epidural lipomatosis, most  significantly resulting in severe concentric thecal sac stenosis with  at least moderate degree spinal canal narrowing at L2-3 and L3-4  resulting in clumping of cauda equina fibers. Additionally, there is   narrowing of the right lateral recess at L4-5 resulting in abutment of  the descending right L5 nerve root.        Lab:        Lab Results   Component Value Date     A1C 5.6 06/17/2022     A1C 5.1 02/19/2020      B12 WNL in 2021               The total time of this encounter today amounted to 34  minutes. This time included time spent with the patient, prep work, ordering tests, and performing post visit documentation.  '  "

## 2023-06-08 ENCOUNTER — OFFICE VISIT (OUTPATIENT)
Dept: FAMILY MEDICINE | Facility: CLINIC | Age: 68
End: 2023-06-08
Payer: COMMERCIAL

## 2023-06-08 ENCOUNTER — OFFICE VISIT (OUTPATIENT)
Dept: NEUROLOGY | Facility: CLINIC | Age: 68
End: 2023-06-08
Payer: COMMERCIAL

## 2023-06-08 VITALS
TEMPERATURE: 97.6 F | HEIGHT: 71 IN | BODY MASS INDEX: 31.5 KG/M2 | WEIGHT: 225 LBS | HEART RATE: 65 BPM | SYSTOLIC BLOOD PRESSURE: 104 MMHG | DIASTOLIC BLOOD PRESSURE: 68 MMHG | RESPIRATION RATE: 18 BRPM | OXYGEN SATURATION: 97 %

## 2023-06-08 VITALS
DIASTOLIC BLOOD PRESSURE: 77 MMHG | BODY MASS INDEX: 31.5 KG/M2 | HEIGHT: 71 IN | WEIGHT: 225 LBS | SYSTOLIC BLOOD PRESSURE: 124 MMHG | HEART RATE: 84 BPM

## 2023-06-08 DIAGNOSIS — R39.9 LOWER URINARY TRACT SYMPTOMS (LUTS): ICD-10-CM

## 2023-06-08 DIAGNOSIS — I10 ESSENTIAL HYPERTENSION: ICD-10-CM

## 2023-06-08 DIAGNOSIS — M21.611 BUNION, RIGHT FOOT: ICD-10-CM

## 2023-06-08 DIAGNOSIS — M79.2 NERVE PAIN: Primary | ICD-10-CM

## 2023-06-08 DIAGNOSIS — G25.0 ESSENTIAL TREMOR: ICD-10-CM

## 2023-06-08 DIAGNOSIS — E78.5 HYPERLIPIDEMIA LDL GOAL <100: ICD-10-CM

## 2023-06-08 DIAGNOSIS — Z86.2 HISTORY OF IRON DEFICIENCY ANEMIA: ICD-10-CM

## 2023-06-08 DIAGNOSIS — I25.2 HISTORY OF ST ELEVATION MYOCARDIAL INFARCTION (STEMI): ICD-10-CM

## 2023-06-08 DIAGNOSIS — Z00.00 ENCOUNTER FOR MEDICAL EXAMINATION TO ESTABLISH CARE: Primary | ICD-10-CM

## 2023-06-08 DIAGNOSIS — N17.9 AKI (ACUTE KIDNEY INJURY) (H): ICD-10-CM

## 2023-06-08 PROBLEM — E66.01 MORBID OBESITY (H): Status: RESOLVED | Noted: 2021-05-14 | Resolved: 2023-06-08

## 2023-06-08 LAB
ALBUMIN SERPL BCG-MCNC: 4.3 G/DL (ref 3.5–5.2)
ALP SERPL-CCNC: 101 U/L (ref 40–129)
ALT SERPL W P-5'-P-CCNC: 21 U/L (ref 10–50)
ANION GAP SERPL CALCULATED.3IONS-SCNC: 10 MMOL/L (ref 7–15)
AST SERPL W P-5'-P-CCNC: 29 U/L (ref 10–50)
BILIRUB DIRECT SERPL-MCNC: <0.2 MG/DL (ref 0–0.3)
BILIRUB SERPL-MCNC: 0.6 MG/DL
BUN SERPL-MCNC: 18.8 MG/DL (ref 8–23)
CALCIUM SERPL-MCNC: 9.5 MG/DL (ref 8.8–10.2)
CHLORIDE SERPL-SCNC: 101 MMOL/L (ref 98–107)
CREAT SERPL-MCNC: 1.3 MG/DL (ref 0.67–1.17)
DEPRECATED HCO3 PLAS-SCNC: 27 MMOL/L (ref 22–29)
ERYTHROCYTE [DISTWIDTH] IN BLOOD BY AUTOMATED COUNT: 12.2 % (ref 10–15)
GFR SERPL CREATININE-BSD FRML MDRD: 60 ML/MIN/1.73M2
GLUCOSE SERPL-MCNC: 95 MG/DL (ref 70–99)
HCT VFR BLD AUTO: 43.7 % (ref 40–53)
HGB BLD-MCNC: 14.8 G/DL (ref 13.3–17.7)
MCH RBC QN AUTO: 29.5 PG (ref 26.5–33)
MCHC RBC AUTO-ENTMCNC: 33.9 G/DL (ref 31.5–36.5)
MCV RBC AUTO: 87 FL (ref 78–100)
PLATELET # BLD AUTO: 193 10E3/UL (ref 150–450)
POTASSIUM SERPL-SCNC: 4.8 MMOL/L (ref 3.4–5.3)
PROT SERPL-MCNC: 7.6 G/DL (ref 6.4–8.3)
PSA SERPL DL<=0.01 NG/ML-MCNC: 1.11 NG/ML (ref 0–4.5)
RBC # BLD AUTO: 5.01 10E6/UL (ref 4.4–5.9)
SODIUM SERPL-SCNC: 138 MMOL/L (ref 136–145)
WBC # BLD AUTO: 6.2 10E3/UL (ref 4–11)

## 2023-06-08 PROCEDURE — 99214 OFFICE O/P EST MOD 30 MIN: CPT | Performed by: FAMILY MEDICINE

## 2023-06-08 PROCEDURE — 99214 OFFICE O/P EST MOD 30 MIN: CPT | Performed by: STUDENT IN AN ORGANIZED HEALTH CARE EDUCATION/TRAINING PROGRAM

## 2023-06-08 PROCEDURE — 85027 COMPLETE CBC AUTOMATED: CPT | Performed by: FAMILY MEDICINE

## 2023-06-08 PROCEDURE — G0103 PSA SCREENING: HCPCS | Performed by: FAMILY MEDICINE

## 2023-06-08 PROCEDURE — 82248 BILIRUBIN DIRECT: CPT | Performed by: FAMILY MEDICINE

## 2023-06-08 PROCEDURE — 36415 COLL VENOUS BLD VENIPUNCTURE: CPT | Performed by: FAMILY MEDICINE

## 2023-06-08 PROCEDURE — 80053 COMPREHEN METABOLIC PANEL: CPT | Performed by: FAMILY MEDICINE

## 2023-06-08 ASSESSMENT — PAIN SCALES - GENERAL: PAINLEVEL: NO PAIN (0)

## 2023-06-08 NOTE — PROGRESS NOTES
Assessment & Plan   1. Encounter for medical examination to establish care: Dr. Garcia retired. Establishing care.    2. MEL (acute kidney injury) (H): BMP drawn by cardiology for medication refills last month. Worsening Cr to 1.9 from 1.2 baseline in 2022 and 1.0-1.1 prior. Has been holding lisinopril with great blood pressure control. Avoiding NSAIDs. Recheck today. If still elevated, check urine protein and order renal imaging (US, +/- CT) to assess for obstruction. If still elevated likely also check inflammatory markers (CRP, ESR), ROYAL, SPEP, etc. Also likely refer to nephrology at that time.  - Basic metabolic panel  (Ca, Cl, CO2, Creat, Gluc, K, Na, BUN); Future    3. Lower urinary tract symptoms (LUTS): Check PSA. Question if related to problem 2. Not too problematic for patient at the moment. Doesn't think flomax has been helpful historically. Nortriptyline may be making this worse from anticholinergic side effects, but doesn't think it has changed since starting medication.  - PSA, screen; Future    4. History of ST elevation myocardial infarction (STEMI): Continue aspirin, statin, zetia, and beta blocker.    5. Essential hypertension: Controlled. Continue off of lisinopril. Continue beta blocker given hx of STEMI.    6. Hyperlipidemia LDL goal <100: Continue statin and zetia.    7. History of iron deficiency anemia: Up to date on colonoscopies.  - CBC with platelets; Future    8. Bunion, right foot: Would like to discuss with podiatry.  - Orthopedic  Referral; Future       Follow-up Visit   Expected date:  Sep 25, 2023 (Approximate)      Follow Up Appointment Details:     Follow-up with whom?: Me    Follow-Up for what?: Medicare Wellness    Any Additional Chronic Condition Management?:  Hypertension  Hyperlipidemia       Welcome or Annual?: Annual Wellness    How?: In Person    Is this an as-needed follow-up?: No                  Taurus Lane MD  [facility]  Medicine    Disclaimer: This note consists of symbols derived from keyboarding, dictation and/or voice recognition software. As a result, there may be errors in the script that have gone undetected. Please consider this when interpreting information found in this chart.    Doron Major is a 67 year old, presenting for the following health issues:  Establish Care, Hypertension, and Lipids        6/8/2023     7:52 AM   Additional Questions   Roomed by VE   Accompanied by SELF     History of Present Illness       CKD: He is not using over the counter pain medicine.     Hypertension: He presents for follow up of hypertension.  He does check blood pressure  regularly outside of the clinic. Outpatient blood pressures have not been over 140/90. He does not follow a low salt diet. He consumes 0 sweetened beverage(s) daily.He exercises with enough effort to increase his heart rate 10 to 19 minutes per day.  He exercises with enough effort to increase his heart rate 5 days per week.   He is taking medications regularly.       Hyperlipidemia Follow-Up      Are you regularly taking any medication or supplement to lower your cholesterol?   Yes- atorvastatin- not taking regularly    Are you having muscle aches or other side effects that you think could be caused by your cholesterol lowering medication?  No    History of inferior STEMI on 12//17/2018 with DIMITRI placement. Stopped Plavix 6/17/22 and continued on daily baby aspirin 81 mg and high dose statin and zetia. Also on metoprolol and IMDUR. BMP ordered 5/1/23 showed elevated Cr. Needs follow-up. Vascepa is expensive for him.    Hx of iron deficiency anemia. Up to date on colonoscopy. Hx of hemorrhoids.    Has not been on lisinopril since 5/5/2023 because of elevated creatinine. Avoiding NSAIDs. Blood pressure has been normal.    Back pain greatly improved. Has appointment with neurology later today. Sees them for essential tremor and neuropathy.    Some difficulty  "starting urination. Did trial flomax in 2021. Started nortriptyline with neurology for neuropathy. Doesn't think the timeline coincides.    Having pain in right foot at MTP joint 1st toe and noticing toes angling laterally.    Review of Systems   Constitutional, HEENT, cardiovascular, pulmonary, GI, , musculoskeletal, neuro, skin, endocrine and psych systems are negative, except as otherwise noted.      Objective    /68 (BP Location: Right arm, Patient Position: Chair, Cuff Size: Adult Large)   Pulse 65   Temp 97.6  F (36.4  C) (Temporal)   Resp 18   Ht 1.791 m (5' 10.5\")   Wt 102.1 kg (225 lb)   SpO2 97%   BMI 31.83 kg/m    Body mass index is 31.83 kg/m .  Physical Exam   General: Appears well and in no acute distress.  Cardiovascular: Regular rate and rhythm, normal S1 and S2 without murmur. No extra heartsounds or friction rub.  Respiratory: Lungs clear to auscultation bilaterally. No wheezing or crackles.  Musculoskeletal: No gross extremity deformities. No peripheral edema. Normal muscle bulk.    Labs: pending            "

## 2023-06-08 NOTE — NURSING NOTE
"Pedrito Negron's goals for this visit include:   Chief Complaint   Patient presents with     RECHECK     Essential Tremor//6 Month follow up        He requests these members of his care team be copied on today's visit information: yes    PCP: Taurus Lane    Referring Provider:  No referring provider defined for this encounter.    /77   Pulse 84   Ht 1.791 m (5' 10.5\")   Wt 102.1 kg (225 lb)   BMI 31.83 kg/m      Do you need any medication refills at today's visit? No  SKYLER uHghes, CMA ([facility])      "

## 2023-06-08 NOTE — RESULT ENCOUNTER NOTE
Please inform of results if patient has not viewed in [employer] within 3 business days.    Your kidney function has improved back to your normal over the last year. Liver function is normal.    PSA - Your Prostate cancer screening lab results were normal.    Please call the clinic with any questions you may have.     Have a great day,    Dr. Cabello

## 2023-06-08 NOTE — PATIENT INSTRUCTIONS
Continue nortriptyline 50 mg for nerve pain,  OK to stay off of gabapentin, in future lyrica (pregabalin).   To ask cardiology---- propranolol as needed for tough tremor that days (20 mg twice a day as needed)   We have options for worsening tremor or worsening nerve pain.

## 2023-06-08 NOTE — RESULT ENCOUNTER NOTE
Please inform of results if patient has not viewed in [employer] within 3 business days.    CBC Results - Your cell counts were normal.    Please call the clinic with any questions you may have.     Have a great day,    Dr. Cabello

## 2023-06-08 NOTE — LETTER
6/8/2023         RE: Pedrito Negron  [address]  [place] [address]        Dear Colleague,    Thank you for referring your patient, Pedrito Negron, to the [facility] [place]. Please see a copy of my visit note below.    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  We also discussed his MRI L spine imaging as below.  His low back symptoms are improved.    His nerve pain improved greatly with nortriptyline, this also facilitates sleep for him.  He tolerated but did not derive much benefit from gabapentin which we were trying to use to treat pain/tremor.   He is on metoprolol for cardiac/BP reasons and we discussed this may be helping his tremor as well.  To me he is doing quite well/quite stable overall neurologically.       Plan:  Continue nortriptyline 50 mg for nerve pain,  OK to stay off of gabapentin, in future lyrica (pregabalin).   To discuss with cardiology team: if propranolol as needed for tough tremor that days (20 mg twice a day as needed) would be an OK option in addition to metoprolol  Follow up in 1 year, sooner with any issues questions or changes       Bobby Daniels MD   of Neurology   [facility]      Interval history:     He is unsure how much gabapentin was helping, tried being off of it for a bit.    Discussed metoprolol as it relates to tremor  Now off of lisinopril given Creatinine rise.  Nortriptyline at 50 mg is helpful for pain.    Tolerated gabapentin, though  Tremor seems subjectively similar to improved, he agrees.   MRI L spine--re reviewed  Bought an inversion table to help with this-- is  helping immensely.    Many family members with similar tremor    A/P at previous visit  Pedrito Negron is a very pleasant 67 year old man seen in follow up for essential tremor.  I think his tremor is quite stable today and he agrees.  We discussed his recent low back MRI and this was reviewed with him as well.  I concur that if this worsens he should again see our spine team/Brenda Tran.   Discussed his concern for neuropathy.  I do think to exam he has a very mild length dependent neuropathy to what I can detect to exam.  I think this is likely secondary to his history of etoh abuse which we discussed.  Best thing to do: not develop diabetes, not resume drinking.  We can continue to monitor this over time.  Should this worsen would recommend EMG, though deficits so mild right now unclear what this would show.  Could help us further parse out what could be from neuropathy vs radiculopathy too if so.      Plan:  --Resume gabapentin at previous dose 600-300-600  --Addition of nortriptyline 25 mg at bedtime to help with nerve discomfort at night  --Continue metoprolol at current dose, tremor stable  --Discussed alpha lipoic acid data, potential promise, could trial at 600 mg daily.    --Follow up with me ~6 months, sooner with issues or questions    Past Medical History:     Patient Active Problem List   Diagnosis     Coronary atherosclerosis     H/O acute myocardial infarction     H/O diverticulitis of colon     H/O ETOH abuse     STEMI (ST elevation myocardial infarction) (H)     Hyperlipidemia LDL goal <100     Essential hypertension     Morbid obesity (H)     Chronic left-sided low back pain without sciatica     Past Medical History:   Diagnosis Date     Diverticulitis      Hypertension         Past Surgical History:     Past Surgical History:   Procedure Laterality Date     COLONOSCOPY WITH CO2 INSUFFLATION N/A 9/14/2020    Procedure: COLONOSCOPY, WITH CO2 INSUFFLATION;  Surgeon: Melinda Saleem MD;  Location:  [facility] OR     large bowel resection      diverticulitis     STENT  2018    cardiac        Social History:     Social History     Tobacco Use     Smoking status: Former     Types: Cigarettes     Quit date:      Years since quittin.4     Smokeless tobacco: Never   Vaping Use     Vaping status: Never Used     Passive vaping exposure: Yes   Substance Use Topics     Alcohol use: Yes     Comment: 7 beers per week      Drug use: Never        Family History:     Family History   Problem Relation Age of Onset     Diabetes Sister         Medications:     Current Outpatient Medications   Medication Sig     alpha-lipoic acid 600 MG capsule Take 1 capsule (600 mg) by mouth daily     aspirin (ASA) 81 MG EC tablet Take 81 mg by mouth daily     atorvastatin (LIPITOR) 80 MG tablet TAKE 1 TABLET(80 MG) BY MOUTH DAILY     Cholecalciferol (VITAMIN D3 PO) Take by mouth daily     ezetimibe (ZETIA) 10 MG tablet Take 1 tablet (10 mg) by mouth daily     gabapentin (NEURONTIN) 300 MG capsule Take 2 capsules (600 mg) by mouth every morning AND 1 capsule (300 mg) daily (with lunch) AND 2 capsules (600 mg) At Bedtime.     Icosapent Ethyl (VASCEPA) 1 g CAPS Take 2 g by mouth 2 times daily (Patient taking differently: Take 1 g by mouth daily)     isosorbide mononitrate (IMDUR) 30 MG 24 hr tablet Take 1 tablet (30 mg) by mouth daily     lisinopril (ZESTRIL) 30 MG tablet Take 1 tablet (30 mg) by mouth daily 5/2-HOLD     metoprolol succinate ER (TOPROL XL) 50 MG 24 hr tablet Take 1 tablet (50 mg) by mouth daily     Multiple Vitamin (MULTI-VITAMINS) TABS Take 1 tablet by mouth daily Centrum Men's 50 +     nitroGLYcerin (NITROSTAT) 0.4 MG sublingual tablet ONE TABLET UNDER TONGUE AS NEEDED FOR CHEST PAIN- IF NO RELIEF AFTER 5 MINUTES CALL 911; USE 1 TABLET EVERY 5 MINUTES- MAX OF 3 TABLETS     nortriptyline (PAMELOR) 25 MG capsule Take 1 capsule (25 mg) by mouth At Bedtime     nortriptyline (PAMELOR) 50 MG capsule Take 1 capsule (50 mg) by mouth  "At Bedtime     omeprazole (PRILOSEC) 20 MG DR capsule Take 20 mg by mouth 2 times daily     ZINC PICOLINATE PO Take 50 mg by mouth Take one tablet daily.     No current facility-administered medications for this visit.        Allergies:   No Known Allergies       Physical Exam:   Vitals: /77   Pulse 84   Ht 1.791 m (5' 10.5\")   Wt 102.1 kg (225 lb)   BMI 31.83 kg/m        Neuro:   General Appearance: No apparent distress, well-nourished, well-groomed, pleasant      Mental Status: Alert and oriented to person, place, and time. Speech fluent and comprehension intact. No dysarthria.     Cranial Nerves:   II: Visual fields: normal  III: Pupils: 3 mm, equal, round, reactive to light   III,IV,VI: Extraocular Movements: intact   V: Facial sensation: intact to light touch  VII: Facial strength: intact without asymmetry  VIII: Hearing: intact grossly  IX: Palate: intact         Motor Exam:   5/5 diffusely     Fine b/l action tremor is present. Tone is normal throughout.     Sensory: intact to LT/PP/vibration today     Coordination: no dysmetria with finger-to-nose bilaterally     Reflexes: biceps, triceps, brachioradialis, patellar, and ankle jerks 1+ and symmetric.         Data: Pertinent prior to visit   Imaging--10/2022 MRI L spine     IMPRESSION:  Multilevel lumbar spondylosis and epidural lipomatosis, most  significantly resulting in severe concentric thecal sac stenosis with  at least moderate degree spinal canal narrowing at L2-3 and L3-4  resulting in clumping of cauda equina fibers. Additionally, there is   narrowing of the right lateral recess at L4-5 resulting in abutment of  the descending right L5 nerve root.        Lab:        Lab Results   Component Value Date     A1C 5.6 06/17/2022     A1C 5.1 02/19/2020      B12 WNL in 2021               The total time of this encounter today amounted to 34  minutes. This time included time spent with the patient, prep work, ordering tests, and performing post " visit documentation.  '    Again, thank you for allowing me to participate in the care of your patient.        Sincerely,        Juan A Daniels MD

## 2023-07-12 ENCOUNTER — ANCILLARY PROCEDURE (OUTPATIENT)
Dept: GENERAL RADIOLOGY | Facility: CLINIC | Age: 68
End: 2023-07-12
Attending: PODIATRIST
Payer: COMMERCIAL

## 2023-07-12 ENCOUNTER — OFFICE VISIT (OUTPATIENT)
Dept: PODIATRY | Facility: CLINIC | Age: 68
End: 2023-07-12
Attending: FAMILY MEDICINE
Payer: COMMERCIAL

## 2023-07-12 ENCOUNTER — TELEPHONE (OUTPATIENT)
Dept: PODIATRY | Facility: CLINIC | Age: 68
End: 2023-07-12

## 2023-07-12 VITALS — HEART RATE: 84 BPM | DIASTOLIC BLOOD PRESSURE: 80 MMHG | SYSTOLIC BLOOD PRESSURE: 130 MMHG

## 2023-07-12 DIAGNOSIS — M21.611 BUNION, RIGHT FOOT: ICD-10-CM

## 2023-07-12 DIAGNOSIS — M10.9 GOUT OF FOOT, UNSPECIFIED CAUSE, UNSPECIFIED CHRONICITY, UNSPECIFIED LATERALITY: Primary | ICD-10-CM

## 2023-07-12 DIAGNOSIS — M10.9 GOUT OF FOOT, UNSPECIFIED CAUSE, UNSPECIFIED CHRONICITY, UNSPECIFIED LATERALITY: ICD-10-CM

## 2023-07-12 LAB
URATE SERPL-MCNC: 8.5 MG/DL (ref 3.4–7)
WBC # BLD AUTO: 7.6 10E3/UL (ref 4–11)

## 2023-07-12 PROCEDURE — 84550 ASSAY OF BLOOD/URIC ACID: CPT

## 2023-07-12 PROCEDURE — 73630 X-RAY EXAM OF FOOT: CPT | Mod: RT | Performed by: RADIOLOGY

## 2023-07-12 PROCEDURE — 36415 COLL VENOUS BLD VENIPUNCTURE: CPT

## 2023-07-12 PROCEDURE — 85048 AUTOMATED LEUKOCYTE COUNT: CPT | Performed by: PODIATRIST

## 2023-07-12 PROCEDURE — 99204 OFFICE O/P NEW MOD 45 MIN: CPT | Performed by: PODIATRIST

## 2023-07-12 RX ORDER — METHYLPREDNISOLONE 4 MG
4 TABLET, DOSE PACK ORAL SEE ADMIN INSTRUCTIONS
Qty: 21 EACH | Refills: 0 | Status: SHIPPED | OUTPATIENT
Start: 2023-07-12 | End: 2023-08-21

## 2023-07-12 NOTE — PATIENT INSTRUCTIONS
We wish you continued good healing. If you have any questions or concerns, please do not hesitate to contact us at  [phone]    "[employer]t (secure e-mail communication and access to your chart) to send a message or to make an appointment.    Please remember to call and schedule a follow up appointment if one was recommended at your earliest convenience.     PODIATRY CLINIC HOURS  TELEPHONE NUMBER    Dr. Dom[facility]        Clinics:  [place]  [facility], [place], [facility]  Tuesday 1PM-6PM  [facility]  Wednesday 745AM-330PM  [place]  Thursday/Friday 745AM-230PM  [place]   1st and 3rd Mondays  845-430 PM   [place] APPOINTMENTS  [phone]    [place] APPOINTMENTS  [phone]    [place] APPOINTMENTS  [phone]        If you need a medication refill, please contact us you may need lab work and/or a follow up visit prior to your refill (i.e. Antifungal medications).  If MRI needed please call Imaging at [phone]   HOW DO I GET MY KNEE SCOOTER? Knee scooters can be picked up at ANY Medical Supply stores with your knee scooter Prescription.  OR  Bring your signed prescription to an [facility] showroom.  Call or bring in your Orthotics order to any Orthotics locations. Or call [phone]

## 2023-07-12 NOTE — TELEPHONE ENCOUNTER
Called patient with uric acid results and discussed he has gout.  His white blood count is normal.  He will take Medrol Dosepak.  I again discussed cause of gout.  He will stay well-hydrated.  Avoid activities or food that bothers this.  Discussed long-term treatment options.  Can treat flareups if they are uncommon.  If more common may consider allopurinol.  RTC as needed.

## 2023-07-12 NOTE — LETTER
7/12/2023         RE: Pedrito Negron  [address]  [facility] [address]        Dear Colleague,    Thank you for referring your patient, Pedrito Negron, to the [facility]. Please see a copy of my visit note below.     Subjective:    Pt is seen today in consult from Dr. Lane with the c/c of right foot bunion.  Has had this for years.  Recently became more painful swollen and red.  Has pain w/ ambulation and shoewear and is relieved by rest.  Even at rest patient having pain.  Denies pain in the contralateral foot.   Denies weakness, numbness, and ecchymosis.  Went on fishing trip recently.  States he was eating more meat.  States he does not drink alcohol at all.  He denies any history of trauma.  Patient has history of peripheral neuropathy and he sees neurology.  He has history of kidney injury.  Patient is retired.  He has a sister with diabetes.  States he has a brother who has had a gout attack in the past.    ROS:  A 10-point review of systems was performed and is positive for that noted in the HPI and as seen below.  All other areas are negative.      No Known Allergies    Current Outpatient Medications   Medication Sig Dispense Refill     methylPREDNISolone (MEDROL DOSEPAK) 4 MG tablet therapy pack Take 1 tablet (4 mg) by mouth See Admin Instructions follow package directions 21 each 0     aspirin (ASA) 81 MG EC tablet Take 81 mg by mouth daily       atorvastatin (LIPITOR) 80 MG tablet TAKE 1 TABLET(80 MG) BY MOUTH DAILY 90 tablet 3     Cholecalciferol (VITAMIN D3 PO) Take by mouth daily       gabapentin (NEURONTIN) 300 MG capsule Take 2 capsules (600 mg) by mouth every morning AND 1 capsule (300 mg) daily (with lunch) AND 2 capsules (600 mg) At Bedtime. 270 capsule 1     isosorbide mononitrate (IMDUR) 30 MG 24 hr tablet Take 1 tablet (30 mg) by mouth daily 90 tablet 3     metoprolol succinate ER (TOPROL XL) 50 MG 24 hr tablet Take 1 tablet (50 mg) by mouth daily 90  tablet 1     Multiple Vitamin (MULTI-VITAMINS) TABS Take 1 tablet by mouth daily Centrum Men's 50 +       nitroGLYcerin (NITROSTAT) 0.4 MG sublingual tablet ONE TABLET UNDER TONGUE AS NEEDED FOR CHEST PAIN- IF NO RELIEF AFTER 5 MINUTES CALL 911; USE 1 TABLET EVERY 5 MINUTES- MAX OF 3 TABLETS 25 tablet 1     nortriptyline (PAMELOR) 50 MG capsule Take 1 capsule (50 mg) by mouth At Bedtime 90 capsule 1     omeprazole (PRILOSEC) 20 MG DR capsule Take 20 mg by mouth 2 times daily       ZINC PICOLINATE PO Take 50 mg by mouth Take one tablet daily.         Patient Active Problem List   Diagnosis     Coronary atherosclerosis     H/O acute myocardial infarction     H/O diverticulitis of colon     STEMI (ST elevation myocardial infarction) (H)     Hyperlipidemia LDL goal <100     Essential hypertension     Chronic left-sided low back pain without sciatica       Past Medical History:   Diagnosis Date     Diverticulitis      Hypertension        Past Surgical History:   Procedure Laterality Date     COLONOSCOPY WITH CO2 INSUFFLATION N/A 2020    Procedure: COLONOSCOPY, WITH CO2 INSUFFLATION;  Surgeon: Melinda Saleem MD;  Location: [facility] OR     large bowel resection      diverticulitis     STENT  2018    cardiac       Family History   Problem Relation Age of Onset     Diabetes Sister        Social History     Tobacco Use     Smoking status: Former     Types: Cigarettes     Quit date:      Years since quittin.5     Passive exposure: Past     Smokeless tobacco: Never   Substance Use Topics     Alcohol use: Yes     Comment: 7 beers per week        Objective:    /80   Pulse 84 .      Constitutional/ general:  Pt is in no apparent distress, appears well-nourished.  Cooperative with history and physical exam.     Psych:  The patient answered questions appropriately.  Normal affect.  Seems to have reasonable expectations, in terms of treatment.     Eyes:  Visual scanning/ tracking without deficit.     Ears:   Response to auditory stimuli is normal.  No  hearing aid devices.  Auricles in proper alignment.     Lymphatic:  Popliteal lymph nodes not enlarged.     Lungs:  Non labored breathing, non labored speech. No cough.  No audible wheezing. Even, quiet breathing.       Vascular:  Pedal pulses are palpable bilaterally.  CFT < 3 sec. some lower extremity edema noted.     Neuro:  Alert and oriented x 3. Coordinated gait.  Light touch sensation is diminished on toes.  Monofilament is intact.  Muscle strength equal and symmetrical bilaterally.    Derm: Somewhat thin and shiny with scant hair growth noted.    Musculoskeletal:    Patient is ambulatory without an assistive device or brace.  No gross deformities.  Normal arch with weightbearing.    MS 5/5 all compartments.    No equinus.   Normal ROM all forefoot and rearfoot joints.  Right bunion noted.  Range of motion is decreased and track bound.  There is erythema edema and pain on palpation all around this joint.  Extensor and flexor tendons are intact.  No pain with loading the lesser metatarsal heads.    Radiographic Exam:   X-ray three views foot shows normal joint space of 1st mtpj.  No other fractures/pathology noted.  IM angle 13 degrees.  Some cystic changes noted medial metatarsal head      Assessment:    Right hallux abductovalgus deformity   gout right foot    Plan:  X-rays taken today of right foot.  Discussed with patient that it appears he is having a gout attack.  Discussed cause of gout and that the first MTPJ is the most commonly affected joint.  Explained this is a different process from his bunion.    Discussed if patient was on fishing trip when he became dehydrated and was eating more red meat this could could trigger a gout attack.  He will make sure he stays well-hydrated.  Discussed treating with Medrol Dosepak since history of kidney injury.  Risk complications and efficacy of this discussed.  Wrote patient a prescription.  He will also try drinking  concentrated cherry juice.  If gout attacks become recurrent will refer to primary care for treatment of this.  We will send to the lab today to check uric acid and white blood count.  Discussed cause of bunion.  If not painful would recommend watching.  Thank you for allowing me participate in the care of this patient.        Domingo Baker DPM, FACFAS         Again, thank you for allowing me to participate in the care of your patient.        Sincerely,        Domingo Baker DPM

## 2023-07-13 NOTE — PROGRESS NOTES
Subjective:    Pt is seen today in consult from Dr. Lane with the c/c of right foot bunion.  Has had this for years.  Recently became more painful swollen and red.  Has pain w/ ambulation and shoewear and is relieved by rest.  Even at rest patient having pain.  Denies pain in the contralateral foot.   Denies weakness, numbness, and ecchymosis.  Went on fishing trip recently.  States he was eating more meat.  States he does not drink alcohol at all.  He denies any history of trauma.  Patient has history of peripheral neuropathy and he sees neurology.  He has history of kidney injury.  Patient is retired.  He has a sister with diabetes.  States he has a brother who has had a gout attack in the past.    ROS:  A 10-point review of systems was performed and is positive for that noted in the HPI and as seen below.  All other areas are negative.      No Known Allergies    Current Outpatient Medications   Medication Sig Dispense Refill     methylPREDNISolone (MEDROL DOSEPAK) 4 MG tablet therapy pack Take 1 tablet (4 mg) by mouth See Admin Instructions follow package directions 21 each 0     aspirin (ASA) 81 MG EC tablet Take 81 mg by mouth daily       atorvastatin (LIPITOR) 80 MG tablet TAKE 1 TABLET(80 MG) BY MOUTH DAILY 90 tablet 3     Cholecalciferol (VITAMIN D3 PO) Take by mouth daily       gabapentin (NEURONTIN) 300 MG capsule Take 2 capsules (600 mg) by mouth every morning AND 1 capsule (300 mg) daily (with lunch) AND 2 capsules (600 mg) At Bedtime. 270 capsule 1     isosorbide mononitrate (IMDUR) 30 MG 24 hr tablet Take 1 tablet (30 mg) by mouth daily 90 tablet 3     metoprolol succinate ER (TOPROL XL) 50 MG 24 hr tablet Take 1 tablet (50 mg) by mouth daily 90 tablet 1     Multiple Vitamin (MULTI-VITAMINS) TABS Take 1 tablet by mouth daily Centrum Men's 50 +       nitroGLYcerin (NITROSTAT) 0.4 MG sublingual tablet ONE TABLET UNDER TONGUE AS NEEDED FOR CHEST PAIN- IF NO RELIEF AFTER 5 MINUTES CALL 911; USE 1  TABLET EVERY 5 MINUTES- MAX OF 3 TABLETS 25 tablet 1     nortriptyline (PAMELOR) 50 MG capsule Take 1 capsule (50 mg) by mouth At Bedtime 90 capsule 1     omeprazole (PRILOSEC) 20 MG DR capsule Take 20 mg by mouth 2 times daily       ZINC PICOLINATE PO Take 50 mg by mouth Take one tablet daily.         Patient Active Problem List   Diagnosis     Coronary atherosclerosis     H/O acute myocardial infarction     H/O diverticulitis of colon     STEMI (ST elevation myocardial infarction) (H)     Hyperlipidemia LDL goal <100     Essential hypertension     Chronic left-sided low back pain without sciatica       Past Medical History:   Diagnosis Date     Diverticulitis      Hypertension        Past Surgical History:   Procedure Laterality Date     COLONOSCOPY WITH CO2 INSUFFLATION N/A 2020    Procedure: COLONOSCOPY, WITH CO2 INSUFFLATION;  Surgeon: Melinda Saleem MD;  Location: [facility] OR     large bowel resection      diverticulitis     STENT  2018    cardiac       Family History   Problem Relation Age of Onset     Diabetes Sister        Social History     Tobacco Use     Smoking status: Former     Types: Cigarettes     Quit date:      Years since quittin.5     Passive exposure: Past     Smokeless tobacco: Never   Substance Use Topics     Alcohol use: Yes     Comment: 7 beers per week        Objective:    /80   Pulse 84 .      Constitutional/ general:  Pt is in no apparent distress, appears well-nourished.  Cooperative with history and physical exam.     Psych:  The patient answered questions appropriately.  Normal affect.  Seems to have reasonable expectations, in terms of treatment.     Eyes:  Visual scanning/ tracking without deficit.     Ears:  Response to auditory stimuli is normal.  No  hearing aid devices.  Auricles in proper alignment.     Lymphatic:  Popliteal lymph nodes not enlarged.     Lungs:  Non labored breathing, non labored speech. No cough.  No audible wheezing. Even, quiet breathing.        Vascular:  Pedal pulses are palpable bilaterally.  CFT < 3 sec. some lower extremity edema noted.     Neuro:  Alert and oriented x 3. Coordinated gait.  Light touch sensation is diminished on toes.  Monofilament is intact.  Muscle strength equal and symmetrical bilaterally.    Derm: Somewhat thin and shiny with scant hair growth noted.    Musculoskeletal:    Patient is ambulatory without an assistive device or brace.  No gross deformities.  Normal arch with weightbearing.    MS 5/5 all compartments.    No equinus.   Normal ROM all forefoot and rearfoot joints.  Right bunion noted.  Range of motion is decreased and track bound.  There is erythema edema and pain on palpation all around this joint.  Extensor and flexor tendons are intact.  No pain with loading the lesser metatarsal heads.    Radiographic Exam:   X-ray three views foot shows normal joint space of 1st mtpj.  No other fractures/pathology noted.  IM angle 13 degrees.  Some cystic changes noted medial metatarsal head      Assessment:    Right hallux abductovalgus deformity   gout right foot    Plan:  X-rays taken today of right foot.  Discussed with patient that it appears he is having a gout attack.  Discussed cause of gout and that the first MTPJ is the most commonly affected joint.  Explained this is a different process from his bunion.    Discussed if patient was on fishing trip when he became dehydrated and was eating more red meat this could could trigger a gout attack.  He will make sure he stays well-hydrated.  Discussed treating with Medrol Dosepak since history of kidney injury.  Risk complications and efficacy of this discussed.  Wrote patient a prescription.  He will also try drinking concentrated cherry juice.  If gout attacks become recurrent will refer to primary care for treatment of this.  We will send to the lab today to check uric acid and white blood count.  Discussed cause of bunion.  If not painful would recommend watching.   Thank you for allowing me participate in the care of this patient.        Domingo Baker, STEPHEN, FACFAS

## 2023-08-01 DIAGNOSIS — E78.5 HYPERLIPIDEMIA LDL GOAL <100: ICD-10-CM

## 2023-08-01 DIAGNOSIS — I25.10 CORONARY ATHEROSCLEROSIS: Primary | ICD-10-CM

## 2023-08-04 ENCOUNTER — OFFICE VISIT (OUTPATIENT)
Dept: CARDIOLOGY | Facility: CLINIC | Age: 68
End: 2023-08-04
Payer: COMMERCIAL

## 2023-08-04 VITALS
OXYGEN SATURATION: 95 % | DIASTOLIC BLOOD PRESSURE: 86 MMHG | HEART RATE: 84 BPM | SYSTOLIC BLOOD PRESSURE: 137 MMHG | BODY MASS INDEX: 30.82 KG/M2 | WEIGHT: 217.9 LBS

## 2023-08-04 DIAGNOSIS — E78.5 HYPERLIPIDEMIA LDL GOAL <100: ICD-10-CM

## 2023-08-04 DIAGNOSIS — G25.0 ESSENTIAL TREMOR: ICD-10-CM

## 2023-08-04 DIAGNOSIS — I10 ESSENTIAL HYPERTENSION: ICD-10-CM

## 2023-08-04 DIAGNOSIS — I21.3 ST ELEVATION MYOCARDIAL INFARCTION (STEMI), UNSPECIFIED ARTERY (H): Primary | ICD-10-CM

## 2023-08-04 DIAGNOSIS — I20.0 UNSTABLE ANGINA (H): ICD-10-CM

## 2023-08-04 PROCEDURE — 99214 OFFICE O/P EST MOD 30 MIN: CPT | Mod: GC | Performed by: INTERNAL MEDICINE

## 2023-08-04 RX ORDER — METOPROLOL SUCCINATE 50 MG/1
50 TABLET, EXTENDED RELEASE ORAL DAILY
Qty: 90 TABLET | Refills: 3 | Status: SHIPPED | OUTPATIENT
Start: 2023-08-04 | End: 2024-09-19

## 2023-08-04 RX ORDER — LISINOPRIL 5 MG/1
5 TABLET ORAL DAILY
Qty: 90 TABLET | Refills: 3 | Status: SHIPPED | OUTPATIENT
Start: 2023-08-04 | End: 2023-08-21

## 2023-08-04 RX ORDER — ISOSORBIDE MONONITRATE 30 MG/1
30 TABLET, EXTENDED RELEASE ORAL DAILY
Qty: 90 TABLET | Refills: 3 | Status: SHIPPED | OUTPATIENT
Start: 2023-08-04 | End: 2024-09-19

## 2023-08-04 RX ORDER — ATORVASTATIN CALCIUM 80 MG/1
TABLET, FILM COATED ORAL
Qty: 90 TABLET | Refills: 3 | Status: SHIPPED | OUTPATIENT
Start: 2023-08-04 | End: 2024-09-19

## 2023-08-04 RX ORDER — EZETIMIBE 10 MG/1
TABLET ORAL
Qty: 90 TABLET | Refills: 3 | Status: SHIPPED | OUTPATIENT
Start: 2023-08-04 | End: 2024-07-25

## 2023-08-04 NOTE — TELEPHONE ENCOUNTER
EZETIMIBE 10 MG TABLET      Last Written Prescription Date:  Not on active med list  Discontinued Stopped by Patient (No AVS) Juan A Daniels MD 6/8/23 1516     Last Office Visit : 6-17-22  Future Office visit:  8-4-23    Routing refill request to provider for review/approval because:  Drug not active on patient's medication list  Discontinue by other provider/clinic

## 2023-08-04 NOTE — PROGRESS NOTES
Pedrito Negron's goals for this visit include: Got an inverted table and his back pain has gotten better.     He requests these members of his care team be copied on today's visit information: PCP    PCP: Taurus Lane    Referring Provider:  Dio Shields MD  [address]  [place] [address]    /86 (BP Location: Right arm, Patient Position: Sitting, Cuff Size: Adult Regular)   Pulse 84   Wt 98.8 kg (217 lb 14.4 oz)   SpO2 95%   BMI 30.82 kg/m          Do you need any medication refills at today's visit? No.    Erlin Chatman, EMT  Clinic Support  [facility]    [phone]    Employed by [facility] [employer]

## 2023-08-04 NOTE — PROGRESS NOTES
[facility] Cardiology Consultation:    Assessment and Plan:     1- Known CAD with multiple PCIs (inferior STEMI on 12/17/2018 s/p DIMITRI to left circumflex/OM and DIMITRI*2 to the proximal and distal RCA,  PCI to LAD in 2010 and another PCI in 2003), mildly reduced LV fxn, stable angina class 2, stress CMR 09/2020 with mild-mod anterolateral ischemia (LAD or OM) in the setting of significant anemia    - Continue asa and statin.   - Continue Imdur 30 mg daily and metoprolol succinate 50 daily     2. Ischemic cardiomyopathy (EF 51% on cardiac MRI 9/2020), lateral wall MI with 50% viability on CMR  - Continue metoprolol.  Resume lisinopril at lower dose of 5 mg, with follow-up BMP check  - No need for diuretics (no heart failure symptoms)  Check echo next year    3- Hypertension, controlled at home  Continue current regimen     4- Hyperlipidemia, goal LDL<70  - Lipitor 80 daily and Ezetimibe 10.  Asked to resume medications     5- Prior iron def anemia, rectal bleed from hemorrhoids, normal colonoscopy    Jaylen Lyon MD  Cardiology fellow (PGY4)  [id number]    [facility]    I have seen and examined the patient, reviewed labs and tests. I have discussed my findings and treatment recommendations with the house staff and/or Cardiology fellow and agree with their assessment and plan as outlined in the note.    Dio Shields MD    Cardiac Imaging and Prevention  [facility]  Pager: [phone]      HPI: CAD routine follow-up.  He has been doing well without any intercurrent medical illness or hospitalization.  Routine labs checked few months ago showed MEL, at which point I had asked him to hold his lisinopril.  He then followed up with his PCP for further evaluation of his MEL, with no clear etiology.  Repeat labs showed return of his creatinine to his baseline.  Recently he was also diagnosed with gout with elevated uric acid level, and was  treated with a steroid burst.  His blood pressures at home after stopping the lisinopril have been normal.  He tells us that he stopped taking the Lipitor as he read something negative about it.  He has not had to take any sublingual nitroglycerin.  Denies any shortness of breath/dyspnea, orthopnea, pnd, palpitations, syncope/presyncope or edema.    Recent Labs   Lab Test 09/26/22  0850 06/17/22  1648   CHOL 130 145   HDL 40 50   LDL 44 60  25   TRIG 231* 348*      EXAM:  /86 (BP Location: Right arm, Patient Position: Sitting, Cuff Size: Adult Regular)   Pulse 84   Wt 98.8 kg (217 lb 14.4 oz)   SpO2 95%   BMI 30.82 kg/m    GEN/CONSTITUIONAL: Appears comfortable, in no apparent distress   EYES: No icterus  ENT/MOUTH: Normal  JVP:  Not visible  RESPIRATORY: Clear to auscultation bilaterally   CARDIOVASCULAR: Regular S1 and S2, no murmurs, rubs, or gallops.   ABDOMEN: Soft, non-tender, positive bowel sounds   NEUROLOGIC: Grossly non-focal   PSYCHIATRIC: Normal affect  EXT: No cyanosis, clubbing, edema. Normal pedal pulses.  Skin: No petechiae, purpura or rash    PAST MEDICAL HISTORY:  Past Medical History:   Diagnosis Date    Diverticulitis     Hypertension        CURRENT MEDICATIONS:  Current Outpatient Medications   Medication    aspirin (ASA) 81 MG EC tablet    atorvastatin (LIPITOR) 80 MG tablet    Cholecalciferol (VITAMIN D3 PO)    gabapentin (NEURONTIN) 300 MG capsule    isosorbide mononitrate (IMDUR) 30 MG 24 hr tablet    methylPREDNISolone (MEDROL DOSEPAK) 4 MG tablet therapy pack    metoprolol succinate ER (TOPROL XL) 50 MG 24 hr tablet    Multiple Vitamin (MULTI-VITAMINS) TABS    nitroGLYcerin (NITROSTAT) 0.4 MG sublingual tablet    nortriptyline (PAMELOR) 50 MG capsule    omeprazole (PRILOSEC) 20 MG DR capsule    ZINC PICOLINATE PO     No current facility-administered medications for this visit.       PAST SURGICAL HISTORY:  Past Surgical History:   Procedure Laterality Date    COLONOSCOPY WITH  CO2 INSUFFLATION N/A 2020    Procedure: COLONOSCOPY, WITH CO2 INSUFFLATION;  Surgeon: Melinda Saleem MD;  Location: [facility] OR    large bowel resection      diverticulitis    STENT  2018    cardiac       ALLERGIES   No Known Allergies    FAMILY HISTORY:  Family History   Problem Relation Age of Onset    Diabetes Sister        SOCIAL HISTORY:  Social History     Socioeconomic History    Marital status: Single     Spouse name: Not on file    Number of children: Not on file    Years of education: Not on file    Highest education level: Not on file   Occupational History    Not on file   Tobacco Use    Smoking status: Former     Types: Cigarettes     Quit date:      Years since quittin.6     Passive exposure: Past    Smokeless tobacco: Never   Vaping Use    Vaping Use: Never used   Substance and Sexual Activity    Alcohol use: Yes     Comment: 7 beers per week     Drug use: Never    Sexual activity: Not Currently     Partners: Female   Other Topics Concern    Parent/sibling w/ CABG, MI or angioplasty before 65F 55M? Not Asked   Social History Narrative    Not on file     Social Determinants of Health     Financial Resource Strain: Not on file   Food Insecurity: Not on file   Transportation Needs: Not on file   Physical Activity: Not on file   Stress: Not on file   Social Connections: Not on file   Intimate Partner Violence: Not on file   Housing Stability: Not on file       ROS:   Constitutional: No fever, chills, or sweats. No weight gain/loss   ENT: No visual disturbance, ear ache, epistaxis, sore throat  Allergies/Immunologic: Negative.   Respiratory: No cough, hemoptysia  Cardiovascular: As per HPI  GI: No nausea, vomiting, hematemesis, melena, or hematochezia  : No urinary frequency, dysuria, or hematuria  Integument: Negative  Psychiatric: Negative  Neuro: Negative  Endocrinology: Negative   Musculoskeletal: Negative    ADDITIONAL COMMENTS:     I reviewed the patient's medications:     I reviewed  the patient's pertinent clinical laboratory studies:     I reviewed the patient's pertinent imaging studies:   I reviewed the patient's ECG:

## 2023-08-17 ENCOUNTER — LAB (OUTPATIENT)
Dept: LAB | Facility: CLINIC | Age: 68
End: 2023-08-17
Payer: COMMERCIAL

## 2023-08-17 DIAGNOSIS — I21.3 ST ELEVATION MYOCARDIAL INFARCTION (STEMI), UNSPECIFIED ARTERY (H): ICD-10-CM

## 2023-08-17 LAB
ANION GAP SERPL CALCULATED.3IONS-SCNC: 9 MMOL/L (ref 7–15)
BUN SERPL-MCNC: 15.6 MG/DL (ref 8–23)
CALCIUM SERPL-MCNC: 9.8 MG/DL (ref 8.8–10.2)
CHLORIDE SERPL-SCNC: 102 MMOL/L (ref 98–107)
CREAT SERPL-MCNC: 1.33 MG/DL (ref 0.67–1.17)
DEPRECATED HCO3 PLAS-SCNC: 27 MMOL/L (ref 22–29)
GFR SERPL CREATININE-BSD FRML MDRD: 58 ML/MIN/1.73M2
GLUCOSE SERPL-MCNC: 71 MG/DL (ref 70–99)
POTASSIUM SERPL-SCNC: 4.6 MMOL/L (ref 3.4–5.3)
SODIUM SERPL-SCNC: 138 MMOL/L (ref 136–145)

## 2023-08-17 PROCEDURE — 36415 COLL VENOUS BLD VENIPUNCTURE: CPT

## 2023-08-17 PROCEDURE — 80048 BASIC METABOLIC PNL TOTAL CA: CPT

## 2023-08-21 ENCOUNTER — OFFICE VISIT (OUTPATIENT)
Dept: FAMILY MEDICINE | Facility: CLINIC | Age: 68
End: 2023-08-21
Payer: COMMERCIAL

## 2023-08-21 VITALS
DIASTOLIC BLOOD PRESSURE: 60 MMHG | OXYGEN SATURATION: 95 % | TEMPERATURE: 97.7 F | WEIGHT: 211 LBS | HEIGHT: 71 IN | SYSTOLIC BLOOD PRESSURE: 100 MMHG | RESPIRATION RATE: 20 BRPM | BODY MASS INDEX: 29.54 KG/M2 | HEART RATE: 63 BPM

## 2023-08-21 DIAGNOSIS — N18.31 CHRONIC KIDNEY DISEASE, STAGE 3A (H): ICD-10-CM

## 2023-08-21 DIAGNOSIS — I25.2 H/O ACUTE MYOCARDIAL INFARCTION: ICD-10-CM

## 2023-08-21 DIAGNOSIS — M10.9 GOUTY ARTHRITIS OF RIGHT GREAT TOE: ICD-10-CM

## 2023-08-21 DIAGNOSIS — I10 ESSENTIAL HYPERTENSION: ICD-10-CM

## 2023-08-21 DIAGNOSIS — R79.89 ELEVATED SERUM CREATININE: Primary | ICD-10-CM

## 2023-08-21 PROCEDURE — 99214 OFFICE O/P EST MOD 30 MIN: CPT | Performed by: FAMILY MEDICINE

## 2023-08-21 RX ORDER — COLCHICINE 0.6 MG/1
TABLET ORAL
Qty: 9 TABLET | Refills: 0 | Status: SHIPPED | OUTPATIENT
Start: 2023-08-21 | End: 2023-09-27

## 2023-08-21 RX ORDER — ALLOPURINOL 100 MG/1
100 TABLET ORAL DAILY
Qty: 90 TABLET | Refills: 0 | Status: SHIPPED | OUTPATIENT
Start: 2023-08-21 | End: 2023-11-16

## 2023-08-21 RX ORDER — LOSARTAN POTASSIUM 25 MG/1
12.5 TABLET ORAL DAILY
Qty: 90 TABLET | Refills: 0 | Status: SHIPPED | OUTPATIENT
Start: 2023-08-21 | End: 2023-09-27

## 2023-08-21 ASSESSMENT — PAIN SCALES - GENERAL: PAINLEVEL: NO PAIN (0)

## 2023-08-21 NOTE — PATIENT INSTRUCTIONS
Important Takeaway Points From This Visit:  Eat a low purine diet (this is ok to research online)  Drink plenty of water.  Avoid alcohol, caffeine, and other diuretics  Start the allopurinol to lower your uric acid levels.  This can cause a rash  This can cause a gout flare when starting, or changing doses  We need to recheck your gout level (uric acid) every month until you are at our goal of a level < 6.  If you get a sudden flare in pain, you can use the as needed colchicine I prescribed. If you do so, stop your Lipitor only while taking the colchicine.  I have changed your lisinopril medication, to losartan, as this can also help lower your uric acid levels. This medication also helps blood pressure and protect your kidneys long term.  We should recheck your potassium, and kidney function in 1 month with your gout level.  I would like to get a picture of your kidneys with an ultrasound. Please call [phone] to schedule your imaging study.      As always, please call with any questions or concerns. I look forward to seeing you again soon!    Take care,  Dr. Lane    Your current medication list is printed. Please keep this with you - it is helpful to bring this current list to any other medical appointments. It can also be helpful if you ever go to the emergency room or hospital.    If you had lab testing today we will call you with the results. The phone number we will call with your results is # [phone] (home) . If this is not the best number please call our clinic and change the number.    If you need any refills, please call your pharmacy and they will contact us.    If you have any further concerns or wish to schedule another appointment, please call our office at [phone].    If you have a medical emergency, please call 317.    Thank you for coming to [facility]!

## 2023-08-21 NOTE — PROGRESS NOTES
Assessment & Plan   1. Elevated serum creatinine  Cardiology had patient on lisinopril 5 mg. Will switch to low dose losartan for CKD, HTN, and additional benefit of helping lower uric acid. Check US given change in Cr over last 2 years and BMP in 2 weeks after this medication change.  - losartan (COZAAR) 25 MG tablet; Take 0.5 tablets (12.5 mg) by mouth daily  Dispense: 90 tablet; Refill: 0  - US Renal Complete w Arterial Duplex; Future  - Basic metabolic panel  (Ca, Cl, CO2, Creat, Gluc, K, Na, BUN); Future  - PRIMARY CARE FOLLOW-UP SCHEDULING; Future    2. Gouty arthritis of right great toe  Start allopurinol and titrate upwards by 100 mg monthly based on lab work until achieving goal uric acid level of < 6. On losartan. Can use colchicine for acute flairs (hold statin briefly while taking this). Avoid NSAIDs given CKD. Could also consider steroids for acute flares.  - Uric acid; Future  - allopurinol (ZYLOPRIM) 100 MG tablet; Take 1 tablet (100 mg) by mouth daily  Dispense: 90 tablet; Refill: 0  - losartan (COZAAR) 25 MG tablet; Take 0.5 tablets (12.5 mg) by mouth daily  Dispense: 90 tablet; Refill: 0  - colchicine (COLCRYS) 0.6 MG tablet; Take 2 tablets (1.2 mg) by mouth daily for 1 day, THEN 1 tablet (0.6 mg) daily for 7 days.  Dispense: 9 tablet; Refill: 0  - Basic metabolic panel  (Ca, Cl, CO2, Creat, Gluc, K, Na, BUN); Future  - PRIMARY CARE FOLLOW-UP SCHEDULING; Future    3. Essential hypertension: Switch to losartan as above.  - losartan (COZAAR) 25 MG tablet; Take 0.5 tablets (12.5 mg) by mouth daily  Dispense: 90 tablet; Refill: 0  - Basic metabolic panel  (Ca, Cl, CO2, Creat, Gluc, K, Na, BUN); Future  - PRIMARY CARE FOLLOW-UP SCHEDULING; Future    4. H/O acute myocardial infarction  Continue statin and aspirin. BP well controlled. Switch to losartan as above. On zetia and imdur as well. Continue following with cardiology.  - losartan (COZAAR) 25 MG tablet; Take 0.5 tablets (12.5 mg) by mouth daily   Dispense: 90 tablet; Refill: 0  - PRIMARY CARE FOLLOW-UP SCHEDULING; Future    5. Chronic kidney disease, stage 3a (H)  Switch to losartan as above. Change in Cr in the last 2 years, check US.  - losartan (COZAAR) 25 MG tablet; Take 0.5 tablets (12.5 mg) by mouth daily  Dispense: 90 tablet; Refill: 0  - US Renal Complete w Arterial Duplex; Future  - Basic metabolic panel  (Ca, Cl, CO2, Creat, Gluc, K, Na, BUN); Future  - PRIMARY CARE FOLLOW-UP SCHEDULING; Future       Follow-up Visit   Expected date:  Sep 21, 2023 (Approximate)      Follow Up Appointment Details:     Follow-up with whom?: Other Primary Care Services    Follow-Up for what?: Lab Visit    How?: In Person                   Taurus Lane MD  [facility]    Disclaimer: This note consists of symbols derived from keyboarding, dictation and/or voice recognition software. As a result, there may be errors in the script that have gone undetected. Please consider this when interpreting information found in this chart.    Doron Major is a 67 year old, presenting for the following health issues:  Arthritis        8/21/2023     6:55 AM   Additional Questions   Roomed by YK   Accompanied by self       Arthritis    History of Present Illness       Reason for visit:  Gout  Symptom onset:  3-4 weeks ago  Symptoms include:  Swollen right big toe, felt like it was broken  Symptom intensity:  Severe  Symptom progression:  Improving  Had these symptoms before:  No  What makes it worse:  Don't Know  What makes it better:  Don't Know    He eats 2-3 servings of fruits and vegetables daily.He consumes 0 sweetened beverage(s) daily.He exercises with enough effort to increase his heart rate 10 to 19 minutes per day.  He exercises with enough effort to increase his heart rate 3 or less days per week.   He is taking medications regularly.     Saw podiatry and diagnosed with gout. Given Medrol dose pack and resolved pain for a short  "time. Has this back now, but to a lesser degree. 7/12/23 uric acid was 8.5.    Wanting information about what to do for his gout.    Shows me picture of his right great toe    Review of Systems   Musculoskeletal:  Positive for arthritis.      Constitutional, HEENT, cardiovascular, pulmonary, GI, , musculoskeletal, neuro, skin, endocrine and psych systems are negative, except as otherwise noted.      Objective    /60   Pulse 63   Temp 97.7  F (36.5  C) (Temporal)   Resp 20   Ht 1.791 m (5' 10.51\")   Wt 95.7 kg (211 lb)   SpO2 95%   BMI 29.84 kg/m    Body mass index is 29.84 kg/m .  Physical Exam   General: Appears well and in no acute distress.  Cardiovascular: Regular rate and rhythm, normal S1 and S2 without murmur. No extra heartsounds or friction rub.  Respiratory: Lungs clear to auscultation bilaterally. No wheezing or crackles.  Musculoskeletal: No gross extremity deformities. No peripheral edema. Normal muscle bulk.    Labs: pending    Imaging: pending              "

## 2023-08-24 DIAGNOSIS — M79.2 NERVE PAIN: ICD-10-CM

## 2023-08-24 NOTE — TELEPHONE ENCOUNTER
Pending Prescriptions:                       Disp   Refills    nortriptyline (PAMELOR) 50 MG capsule     90 cap*1            Sig: Take 1 capsule (50 mg) by mouth At Bedtime    Requested Pharmacy: [facility] in [place]    Pt's last office visit: 6/8/23  Next scheduled office visit: 6/12/24      Per the RN/LPN medication refill protocol, writer is unable to refill this request.          Other

## 2023-08-25 RX ORDER — NORTRIPTYLINE HYDROCHLORIDE 50 MG/1
50 CAPSULE ORAL AT BEDTIME
Qty: 90 CAPSULE | Refills: 1 | Status: SHIPPED | OUTPATIENT
Start: 2023-08-25 | End: 2024-02-19

## 2023-08-25 NOTE — TELEPHONE ENCOUNTER
Refill authorized per medication refill protocol.       Jessica Awad, RNCC  Neurology/Neurosurgery

## 2023-09-12 ENCOUNTER — LAB (OUTPATIENT)
Dept: LAB | Facility: CLINIC | Age: 68
End: 2023-09-12
Payer: COMMERCIAL

## 2023-09-12 DIAGNOSIS — M10.9 GOUTY ARTHRITIS OF RIGHT GREAT TOE: ICD-10-CM

## 2023-09-12 DIAGNOSIS — I10 ESSENTIAL HYPERTENSION: ICD-10-CM

## 2023-09-12 DIAGNOSIS — N18.31 CHRONIC KIDNEY DISEASE, STAGE 3A (H): ICD-10-CM

## 2023-09-12 DIAGNOSIS — R79.89 ELEVATED SERUM CREATININE: ICD-10-CM

## 2023-09-12 LAB
ANION GAP SERPL CALCULATED.3IONS-SCNC: 11 MMOL/L (ref 7–15)
BUN SERPL-MCNC: 14.6 MG/DL (ref 8–23)
CALCIUM SERPL-MCNC: 9.7 MG/DL (ref 8.8–10.2)
CHLORIDE SERPL-SCNC: 103 MMOL/L (ref 98–107)
CREAT SERPL-MCNC: 1.26 MG/DL (ref 0.67–1.17)
DEPRECATED HCO3 PLAS-SCNC: 25 MMOL/L (ref 22–29)
EGFRCR SERPLBLD CKD-EPI 2021: 62 ML/MIN/1.73M2
GLUCOSE SERPL-MCNC: 88 MG/DL (ref 70–99)
POTASSIUM SERPL-SCNC: 4.2 MMOL/L (ref 3.4–5.3)
SODIUM SERPL-SCNC: 139 MMOL/L (ref 136–145)
URATE SERPL-MCNC: 6.1 MG/DL (ref 3.4–7)

## 2023-09-12 PROCEDURE — 84550 ASSAY OF BLOOD/URIC ACID: CPT

## 2023-09-12 PROCEDURE — 36415 COLL VENOUS BLD VENIPUNCTURE: CPT

## 2023-09-12 PROCEDURE — 80048 BASIC METABOLIC PNL TOTAL CA: CPT

## 2023-09-13 NOTE — RESULT ENCOUNTER NOTE
Please inform of results if patient has not viewed in XING within 3 business days.    BMP - Your blood sugar was 88. Your kidney function and electrolytes were normal.    Continue on the losartan. We still need to recheck your blood pressure after switching from lisinopril to losartan. This can be done at your visit with me on 9/27/23. Have you had any side effects?    Your uric acid level is improving and almost at goal <6. I'd like to recheck this at the 9/27 visit as well after you have been on the allopurinol > 1 month. We may have you increase to 200 mg of allopurinol at that time.    Please call the clinic with any questions you may have.     Have a great day,    Dr. Cabello

## 2023-09-14 ENCOUNTER — ANCILLARY PROCEDURE (OUTPATIENT)
Dept: ULTRASOUND IMAGING | Facility: CLINIC | Age: 68
End: 2023-09-14
Attending: FAMILY MEDICINE
Payer: COMMERCIAL

## 2023-09-14 DIAGNOSIS — N18.31 CHRONIC KIDNEY DISEASE, STAGE 3A (H): ICD-10-CM

## 2023-09-14 DIAGNOSIS — R79.89 ELEVATED SERUM CREATININE: ICD-10-CM

## 2023-09-14 DIAGNOSIS — I70.1 RIGHT RENAL ARTERY STENOSIS (H): Primary | ICD-10-CM

## 2023-09-14 PROCEDURE — 93975 VASCULAR STUDY: CPT | Performed by: RADIOLOGY

## 2023-09-14 NOTE — RESULT ENCOUNTER NOTE
Please inform of results if patient has not viewed in [facility] within 3 business days.    I called and left message with results.    Your ultrasound showed the blood flow to the left kidney was normal.    It also suggested there may be a slight narrowing of the artery (blood vessel) that feeds the right kidney, but is not diagnostic for it. This can be a reason for high blood pressure as well as an elevated creatine blood level.    Additionally, they noted there is build up of pressure in the smaller arteries leading to the kidney. This suggests the blood is meeting more resistance as it reaches your kidneys, the filters of your body.     Because of these findings, I would like you to see a kidney specialist. You should get a call to schedule this within 2 business days. If not, you can call [phone] to schedule.    Please call the clinic with any questions you may have.     Have a great day,    Dr. Cabello

## 2023-09-23 ENCOUNTER — MYC MEDICAL ADVICE (OUTPATIENT)
Dept: FAMILY MEDICINE | Facility: CLINIC | Age: 68
End: 2023-09-23
Payer: COMMERCIAL

## 2023-09-27 ENCOUNTER — ANCILLARY PROCEDURE (OUTPATIENT)
Dept: GENERAL RADIOLOGY | Facility: CLINIC | Age: 68
End: 2023-09-27
Attending: FAMILY MEDICINE
Payer: COMMERCIAL

## 2023-09-27 ENCOUNTER — OFFICE VISIT (OUTPATIENT)
Dept: FAMILY MEDICINE | Facility: CLINIC | Age: 68
End: 2023-09-27
Attending: FAMILY MEDICINE
Payer: COMMERCIAL

## 2023-09-27 ENCOUNTER — LAB (OUTPATIENT)
Dept: LAB | Facility: CLINIC | Age: 68
End: 2023-09-27
Payer: COMMERCIAL

## 2023-09-27 VITALS
HEIGHT: 69 IN | TEMPERATURE: 98 F | HEART RATE: 77 BPM | RESPIRATION RATE: 16 BRPM | WEIGHT: 208.5 LBS | DIASTOLIC BLOOD PRESSURE: 72 MMHG | BODY MASS INDEX: 30.88 KG/M2 | OXYGEN SATURATION: 94 % | SYSTOLIC BLOOD PRESSURE: 126 MMHG

## 2023-09-27 DIAGNOSIS — M79.674 PAIN OF TOE OF RIGHT FOOT: ICD-10-CM

## 2023-09-27 DIAGNOSIS — I25.10 ATHEROSCLEROSIS OF NATIVE CORONARY ARTERY OF NATIVE HEART WITHOUT ANGINA PECTORIS: ICD-10-CM

## 2023-09-27 DIAGNOSIS — Z12.5 SCREENING FOR PROSTATE CANCER: ICD-10-CM

## 2023-09-27 DIAGNOSIS — N18.31 CHRONIC KIDNEY DISEASE, STAGE 3A (H): ICD-10-CM

## 2023-09-27 DIAGNOSIS — R79.89 ELEVATED SERUM CREATININE: ICD-10-CM

## 2023-09-27 DIAGNOSIS — Z00.00 ENCOUNTER FOR MEDICARE ANNUAL WELLNESS EXAM: Primary | ICD-10-CM

## 2023-09-27 DIAGNOSIS — Z23 NEED FOR IMMUNIZATION AGAINST INFLUENZA: ICD-10-CM

## 2023-09-27 DIAGNOSIS — I10 ESSENTIAL HYPERTENSION: ICD-10-CM

## 2023-09-27 DIAGNOSIS — M10.9 GOUTY ARTHRITIS OF RIGHT GREAT TOE: ICD-10-CM

## 2023-09-27 DIAGNOSIS — Z13.6 ENCOUNTER FOR ABDOMINAL AORTIC ANEURYSM (AAA) SCREENING: ICD-10-CM

## 2023-09-27 DIAGNOSIS — E66.01 MORBID OBESITY (H): ICD-10-CM

## 2023-09-27 LAB
ALBUMIN UR-MCNC: NEGATIVE MG/DL
APPEARANCE UR: CLEAR
BACTERIA #/AREA URNS HPF: ABNORMAL /HPF
BILIRUB UR QL STRIP: NEGATIVE
CHOLEST SERPL-MCNC: 102 MG/DL
COLOR UR AUTO: YELLOW
CREAT UR-MCNC: 105 MG/DL
GLUCOSE UR STRIP-MCNC: NEGATIVE MG/DL
HDLC SERPL-MCNC: 32 MG/DL
HGB UR QL STRIP: NEGATIVE
KETONES UR STRIP-MCNC: NEGATIVE MG/DL
LDLC SERPL CALC-MCNC: 45 MG/DL
LEUKOCYTE ESTERASE UR QL STRIP: NEGATIVE
MICROALBUMIN UR-MCNC: <12 MG/L
MICROALBUMIN/CREAT UR: NORMAL MG/G{CREAT}
MUCOUS THREADS #/AREA URNS LPF: PRESENT /LPF
NITRATE UR QL: NEGATIVE
NONHDLC SERPL-MCNC: 70 MG/DL
PH UR STRIP: 7.5 [PH] (ref 5–7)
PSA SERPL DL<=0.01 NG/ML-MCNC: 0.92 NG/ML (ref 0–4.5)
RBC #/AREA URNS AUTO: ABNORMAL /HPF
SP GR UR STRIP: 1.02 (ref 1–1.03)
SQUAMOUS #/AREA URNS AUTO: ABNORMAL /LPF
TRIGL SERPL-MCNC: 126 MG/DL
URATE SERPL-MCNC: 5.7 MG/DL (ref 3.4–7)
UROBILINOGEN UR STRIP-ACNC: 0.2 E.U./DL
WBC #/AREA URNS AUTO: ABNORMAL /HPF

## 2023-09-27 PROCEDURE — 99397 PER PM REEVAL EST PAT 65+ YR: CPT | Mod: 25 | Performed by: FAMILY MEDICINE

## 2023-09-27 PROCEDURE — 36415 COLL VENOUS BLD VENIPUNCTURE: CPT | Performed by: FAMILY MEDICINE

## 2023-09-27 PROCEDURE — 99214 OFFICE O/P EST MOD 30 MIN: CPT | Mod: 25 | Performed by: FAMILY MEDICINE

## 2023-09-27 PROCEDURE — 82570 ASSAY OF URINE CREATININE: CPT

## 2023-09-27 PROCEDURE — 90471 IMMUNIZATION ADMIN: CPT | Performed by: FAMILY MEDICINE

## 2023-09-27 PROCEDURE — 90662 IIV NO PRSV INCREASED AG IM: CPT | Performed by: FAMILY MEDICINE

## 2023-09-27 PROCEDURE — 82043 UR ALBUMIN QUANTITATIVE: CPT

## 2023-09-27 PROCEDURE — G0103 PSA SCREENING: HCPCS | Performed by: FAMILY MEDICINE

## 2023-09-27 PROCEDURE — 81001 URINALYSIS AUTO W/SCOPE: CPT

## 2023-09-27 PROCEDURE — 80061 LIPID PANEL: CPT | Performed by: FAMILY MEDICINE

## 2023-09-27 PROCEDURE — 73660 X-RAY EXAM OF TOE(S): CPT | Mod: TC | Performed by: RADIOLOGY

## 2023-09-27 PROCEDURE — 84550 ASSAY OF BLOOD/URIC ACID: CPT | Performed by: FAMILY MEDICINE

## 2023-09-27 RX ORDER — LOSARTAN POTASSIUM 25 MG/1
25 TABLET ORAL DAILY
Qty: 90 TABLET | Refills: 1 | Status: SHIPPED | OUTPATIENT
Start: 2023-09-27 | End: 2024-04-01

## 2023-09-27 RX ORDER — COLCHICINE 0.6 MG/1
TABLET ORAL
Qty: 9 TABLET | Refills: 0 | Status: SHIPPED | OUTPATIENT
Start: 2023-09-27 | End: 2023-10-05

## 2023-09-27 ASSESSMENT — ENCOUNTER SYMPTOMS
CHILLS: 0
SORE THROAT: 0
ARTHRALGIAS: 1
JOINT SWELLING: 1
DIZZINESS: 0
PARESTHESIAS: 0
MYALGIAS: 0
DIARRHEA: 0
HEADACHES: 0
EYE PAIN: 0
HEMATOCHEZIA: 0
COUGH: 0
HEARTBURN: 0
WEAKNESS: 0
ABDOMINAL PAIN: 0
FEVER: 0
DYSURIA: 0
HEMATURIA: 0
NERVOUS/ANXIOUS: 0
NAUSEA: 0
PALPITATIONS: 0
CONSTIPATION: 0
SHORTNESS OF BREATH: 0
FREQUENCY: 0

## 2023-09-27 ASSESSMENT — ACTIVITIES OF DAILY LIVING (ADL): CURRENT_FUNCTION: NO ASSISTANCE NEEDED

## 2023-09-27 ASSESSMENT — PAIN SCALES - GENERAL: PAINLEVEL: NO PAIN (0)

## 2023-09-27 NOTE — RESULT ENCOUNTER NOTE
Please inform of results if patient has not viewed in GLSS within 3 business days.    Your xray looks like you do have some damage to your joints in the first and second toes because of gout. It is important to get your gout under control and prevent future attacks. Continue with the plan to use the pads mentioned.    I will contact you with your uric acid level when available to let you know if we need to adjust your dose.    Continue with the plan to see your nutritionist.    Please call the clinic with any questions you may have.     Have a great day,    Dr. Cabello

## 2023-09-27 NOTE — PATIENT INSTRUCTIONS
Important Takeaway Points From This Visit:  Try a metatarsal pad for your foot pain.  If you take the colchicine medication, stop the Lipitor while on the medication.  You can schedule a nutrition referral by calling [phone] or wait for them to call in 2 days.  Please call [phone] to schedule your imaging study.        As always, please call with any questions or concerns. I look forward to seeing you again soon!    Take care,  Dr. Lane    Your current medication list is printed. Please keep this with you - it is helpful to bring this current list to any other medical appointments. It can also be helpful if you ever go to the emergency room or hospital.    If you had lab testing today we will call you with the results. The phone number we will call with your results is # [phone] (home) . If this is not the best number please call our clinic and change the number.    If you need any refills, please call your pharmacy and they will contact us.    If you have any further concerns or wish to schedule another appointment, please call our office at [phone].    If you have a medical emergency, please call [phone].    Thank you for coming to [facility]!      Preventive Health Recommendations:     See your health care provider every year to  Review health changes.   Discuss preventive care.    Review your medicines if your doctor has prescribed any.    Talk with your health care provider about whether you should have a test to screen for prostate cancer (PSA).  Every 3 years, have a diabetes test (fasting glucose). If you are at risk for diabetes, you should have this test more often.  Every 5 years, have a cholesterol test. Have this test more often if you are at risk for high cholesterol or heart disease.   Every 10 years, have a colonoscopy. Or, have a yearly FIT test (stool test). These exams will check for colon cancer.  Talk to with your health care provider about screening  for Abdominal Aortic Aneurysm if you have a family history of AAA or have a history of smoking.    Shots:   Get a flu shot each year.   Get a tetanus shot every 10 years.   Talk to your doctor about your pneumonia vaccines. There are now two you should receive - Pneumovax (PPSV 23) and Prevnar (PCV 13).   Talk to your pharmacist about a shingles vaccine.   Talk to your doctor about the hepatitis B vaccine.  Nutrition:   Eat at least 5 servings of fruits and vegetables each day.   Eat whole-grain bread, whole-wheat pasta and brown rice instead of white grains and rice.   Get adequate Calcium and Vitamin D.   Lifestyle  Exercise for at least 150 minutes a week (30 minutes a day, 5 days a week). This will help you control your weight and prevent disease.   Limit alcohol to one drink per day.   No smoking.   Wear sunscreen to prevent skin cancer.  See your dentist every six months for an exam and cleaning.  See your eye doctor every 1 to 2 years to screen for conditions such as glaucoma, macular degeneration, cataracts, etc.    Personalized Prevention Plan  You are due for the preventive services outlined below.  Your care team is available to assist you in scheduling these services.  If you have already completed any of these items, please share that information with your care team to update in your medical record.  Health Maintenance Due   Topic Date Due     Kidney Microalbumin Urine Test  Never done     AORTIC ANEURYSM SCREENING (SYSTEM ASSIGNED)  Never done     COVID-19 Vaccine (4 - Moderna series) 03/15/2022     Cholesterol Lab  09/26/2023     Patient Education   Personalized Prevention Plan  You are due for the preventive services outlined below.  Your care team is available to assist you in scheduling these services.  If you have already completed any of these items, please share that information with your care team to update in your medical record.  Health Maintenance Due   Topic Date Due     Kidney Microalbumin  Urine Test  Never done     AORTIC ANEURYSM SCREENING (SYSTEM ASSIGNED)  Never done     COVID-19 Vaccine (4 - Moderna series) 03/15/2022     Cholesterol Lab  09/26/2023

## 2023-09-27 NOTE — PROGRESS NOTES
"SUBJECTIVE:   Pedrito is a 67 year old who presents for Preventive Visit.      9/27/2023     1:49 PM   Additional Questions   Roomed by YK   Accompanied by self     Are you in the first 12 months of your Medicare coverage?  Currently not on medicare yet.    Healthy Habits:     In general, how would you rate your overall health?  Good    Frequency of exercise:  2-3 days/week    Duration of exercise:  Less than 15 minutes    Do you usually eat at least 4 servings of fruit and vegetables a day, include whole grains    & fiber and avoid regularly eating high fat or \"junk\" foods?  Yes    Taking medications regularly:  Yes    Medication side effects:  None    Ability to successfully perform activities of daily living:  No assistance needed    Home Safety:  No safety concerns identified    Hearing Impairment:  No hearing concerns    In the past 6 months, have you been bothered by leaking of urine?  No    In general, how would you rate your overall mental or emotional health?  Good    Additional concerns today:  No (Wondering about home test for sleep apnea - brother and sister in law did test)    Patient presents to clinic for Medicare annual wellness exam with additional complaints. He complains of R long toe pain and swelling since early July 2023. At the time he also had his first gout attack so when he saw podiatry the gout flare was the primary concern. The pain is mild-moderate in intensity, worse with walking, and relieved with rest. Wears supportive shoes.     Would like refill of colchicine for as needed use. Educated to use only in setting of gouty attack. Educated to hold Lipitor when using colchicine. He reports confusion with diet recommendations per gout guidelines and would like to talk to nutritionist to discuss best diet practices.     Reports worsening night vision. Follows with ophthalmology who are managing.    He would like a flu shot. Declines Covid booster today.     He would like AAA screening. "     Today's PHQ-2 Score:       2023     1:23 PM   PHQ-2 (  [employer])   Q1: Little interest or pleasure in doing things 0   Q2: Feeling down, depressed or hopeless 0   PHQ-2 Score 0   Q1: Little interest or pleasure in doing things Not at all   Q2: Feeling down, depressed or hopeless Not at all   PHQ-2 Score 0     Have you ever done Advance Care Planning? (For example, a Health Directive, POLST, or a discussion with a medical provider or your loved ones about your wishes): No, advance care planning information given to patient to review.  Patient plans to discuss their wishes with loved ones or provider.       Fall risk  Fallen 2 or more times in the past year?: No  Any fall with injury in the past year?: No    Cognitive Screening   1) Repeat 3 items (Leader, Season, Table)    2) Clock draw: NORMAL  3) 3 item recall: Recalls 3 objects  Results: 3 items recalled: COGNITIVE IMPAIRMENT LESS LIKELY    Mini-CogTM Copyright JENNYFER Cabrales. Licensed by the author for use in [facility]; reprinted with permission (steffany@[facility]). All rights reserved.      Do you have sleep apnea, excessive snoring or daytime drowsiness? : no    Reviewed and updated as needed this visit by clinical staff   Tobacco  Allergies  Meds  Problems  Med Hx  Surg Hx  Fam Hx  Soc   Hx      Reviewed and updated as needed this visit by Provider   Tobacco  Allergies  Meds  Problems  Med Hx  Surg Hx  Fam Hx  Soc   Hx       Social History     Tobacco Use    Smoking status: Former     Types: Cigarettes     Quit date:      Years since quittin.7     Passive exposure: Past    Smokeless tobacco: Never   Substance Use Topics    Alcohol use: Yes     Comment: 7 beers per week          2023     1:23 PM   Alcohol Use   Prescreen: >3 drinks/day or >7 drinks/week? Not Applicable     Do you have a current opioid prescription? No  Do you use any other controlled substances or medications that are not prescribed by a provider?  None    Current providers sharing in care for this patient include:   Patient Care Team:  Taurus Lane MD as PCP - General (Family Medicine)  Juan A Daniels MD as Assigned Neuroscience Provider  Dio Shields MD as Assigned Heart and Vascular Provider  Taurus Lane MD as Assigned PCP  Domingo Baker DPM as Assigned Surgical Provider  Zohaib Duke MD as MD (Internal Medicine)    The following health maintenance items are reviewed in Epic and correct as of today:  Health Maintenance   Topic Date Due    MICROALBUMIN  Never done    AORTIC ANEURYSM SCREENING (SYSTEM ASSIGNED)  Never done    COVID-19 Vaccine (4 - Moderna series) 03/15/2022    LIPID  09/26/2023    HEMOGLOBIN  06/08/2024    BMP  09/12/2024    MEDICARE ANNUAL WELLNESS VISIT  09/27/2024    ANNUAL REVIEW OF HM ORDERS  09/27/2024    FALL RISK ASSESSMENT  09/27/2024    COLORECTAL CANCER SCREENING  09/14/2025    ADVANCE CARE PLANNING  09/27/2028    DTAP/TDAP/TD IMMUNIZATION (3 - Td or Tdap) 09/16/2031    HEPATITIS C SCREENING  Completed    PHQ-2 (once per calendar year)  Completed    INFLUENZA VACCINE  Completed    Pneumococcal Vaccine: 65+ Years  Completed    URINALYSIS  Completed    ZOSTER IMMUNIZATION  Completed    IPV IMMUNIZATION  Aged Out    HPV IMMUNIZATION  Aged Out    MENINGITIS IMMUNIZATION  Aged Out     Lab work and imagine is in process    Review of Systems   Constitutional:  Negative for chills and fever.   HENT:  Negative for congestion, ear pain, hearing loss and sore throat.    Eyes:  Positive for visual disturbance. Negative for pain.   Respiratory:  Negative for cough and shortness of breath.    Cardiovascular:  Negative for chest pain, palpitations and peripheral edema.   Gastrointestinal:  Negative for abdominal pain, constipation, diarrhea, heartburn, hematochezia and nausea.   Genitourinary:  Negative for dysuria, frequency, genital sores, hematuria, impotence, penile discharge and urgency.  "  Musculoskeletal:  Positive for arthralgias and joint swelling. Negative for myalgias.   Skin:  Negative for rash.   Neurological:  Negative for dizziness, weakness, headaches and paresthesias.   Psychiatric/Behavioral:  Negative for mood changes. The patient is not nervous/anxious.      Past Medical History:   Diagnosis Date    Diverticulitis     Gout 07/2023    Hypertension      Past Surgical History:   Procedure Laterality Date    COLONOSCOPY WITH CO2 INSUFFLATION N/A 9/14/2020    Procedure: COLONOSCOPY, WITH CO2 INSUFFLATION;  Surgeon: Melinda Saleem MD;  Location: [facility] OR    large bowel resection      diverticulitis    STENT  2018    cardiac     OBJECTIVE:   /72   Pulse 77   Temp 98  F (36.7  C) (Temporal)   Resp 16   Ht 1.746 m (5' 8.74\")   Wt 94.6 kg (208 lb 8 oz)   SpO2 94%   BMI 31.02 kg/m   Estimated body mass index is 31.02 kg/m  as calculated from the following:    Height as of this encounter: 1.746 m (5' 8.74\").    Weight as of this encounter: 94.6 kg (208 lb 8 oz).  Physical Exam  GENERAL: healthy, alert and no distress  EYES: Eyes grossly normal to inspection, PERRL and conjunctivae and sclerae normal  HENT: ear canals and TM's normal, nose and mouth without ulcers or lesions  NECK: no adenopathy, no asymmetry, masses, or scars and thyroid normal to palpation  RESP: lungs clear to auscultation - no rales, rhonchi or wheezes  CV: regular rate and rhythm, normal S1 S2, no S3 or S4, no murmur, click or rub, no peripheral edema and peripheral pulses strong  ABDOMEN: soft, nontender, no hepatosplenomegaly, no masses and bowel sounds normal  MS: no gross musculoskeletal defects noted, no edema  SKIN: no suspicious lesions or rashes  NEURO: Normal strength and tone, mentation intact and speech normal  PSYCH: mentation appears normal, affect normal/bright    Diagnostic Test Results: pending    ASSESSMENT / PLAN:   1. Encounter for Medicare annual wellness exam  Discussed personal health and " safety. Routine screenings as below. Appropriate anticipatory guidance, vaccinations, and health screening recommendations delivered according to the USPSTF and other appropriate society guidelines.  Patient understands and is agreeable with the plan ordered below.  - INFLUENZA VACCINE 65+ (FLUZONE HD)  - REVIEW OF HEALTH MAINTENANCE PROTOCOL ORDERS  - Lipid panel reflex to direct LDL Non-fasting; Future  - PSA, screen; Future  - US Aorta Medicare AAA Screening; Future  - VACCINE ADMINISTRATION, INITIAL    2. Atherosclerosis of native coronary artery of native heart without angina pectoris  Managed by cardiology. Currently on Lipitor, zetia, IMDUR, BB, ASA. Last stenting done in 2018. Will recheck lipid panel today. Continue good control of other co-morbidities.  - Lipid panel reflex to direct LDL Non-fasting; Future    3. Gouty arthritis of right great toe  First gouty attack in 07/2023. Slightly elevated uric acid levels at repeat check on 9/12. Started on allopurinol 100mg daily. Will recheck uric acid again today with goal <6. Kidney function nearly at baseline. Refilled colchicine as below to have on hand as needed for gout flare, instructed not to use unless having a flare and to hold Lipitor if taking colchicine.   - Nutrition Referral; Future  - Uric acid; Future  - colchicine (COLCRYS) 0.6 MG tablet; Take 2 tablets (1.2 mg) by mouth daily for 1 day, THEN 1 tablet (0.6 mg) daily for 7 days.  Dispense: 9 tablet; Refill: 0  - losartan (COZAAR) 25 MG tablet; Take 1 tablet (25 mg) by mouth daily  Dispense: 90 tablet; Refill: 1    4. Encounter for abdominal aortic aneurysm (AAA) screening  History of smoking. 1x AAA screening per USPSTF guidelines.   - US Aorta Medicare AAA Screening; Future    5. Pain of toe of right foot  Long toe pain ongoing since early July. Consider acute injury, will check Xray. Recommended wearing metatarsal pad consistently to assist with pain relief.   - XR Toe Right G/E 2 Views;  "Future    6. Screening for prostate cancer  Routine. Historically normal.   - PSA, screen; Future    7. Need for immunization against influenza  - INFLUENZA VACCINE 65+ (FLUZONE HD)  - VACCINE ADMINISTRATION, INITIAL    8. Essential hypertension  9. Chronic kidney disease, stage 3a (H)  Creatinine stabilizing.   - losartan (COZAAR) 25 MG tablet; Take 1 tablet (25 mg) by mouth daily  Dispense: 90 tablet; Refill: 1    10.  Morbid obesity (H)  Has lost ~20lbs an continuing to try to lose more. Referral for nutritionist.   - Lipid panel reflex to direct LDL Non-fasting; Future  - Nutrition Referral; Future      COUNSELING:  Reviewed preventive health counseling, as reflected in patient instructions  Special attention given to:       Healthy diet/nutrition       Vision screening       Dental care       Fall risk prevention    BMI:   Estimated body mass index is 31.02 kg/m  as calculated from the following:    Height as of this encounter: 1.746 m (5' 8.74\").    Weight as of this encounter: 94.6 kg (208 lb 8 oz).   Weight management plan: Discussed healthy diet and exercise guidelines nutritionist referral given    He reports that he quit smoking about 22 years ago. His smoking use included cigarettes. He has been exposed to tobacco smoke. He has never used smokeless tobacco.    Appropriate preventive services were discussed with this patient, including applicable screening as appropriate for cardiovascular disease, diabetes, osteopenia/osteoporosis, and glaucoma.  As appropriate for age/gender, discussed screening for colorectal cancer, prostate cancer, breast cancer, and cervical cancer. Checklist reviewing preventive services available has been given to the patient.    Reviewed patients plan of care and provided an AVS. The Basic Care Plan (routine screening as documented in Health Maintenance) for Pedrito meets the Care Plan requirement. This Care Plan has been established and reviewed with the Patient.    Taurus " MD [facility]  [facility]    Disclaimer: This note consists of symbols derived from keyboarding, dictation and/or voice recognition software. As a result, there may be errors in the script that have gone undetected. Please consider this when interpreting information found in this chart.      Identified Health Risks:  I have reviewed Opioid Use Disorder and Substance Use Disorder risk factors and made any needed referrals.

## 2023-09-28 NOTE — RESULT ENCOUNTER NOTE
Please inform of results if patient has not viewed in [facility] within 3 business days.    Your urine protein lab to monitor for kidney disease was normal.    Please call the clinic with any questions you may have.     Have a great day,    Dr. Cabello

## 2023-09-28 NOTE — RESULT ENCOUNTER NOTE
"Please inform of results if patient has not viewed in [facility] within 3 business days.    Lipids - Your \"bad\" cholesterol lab results were normal. Your \"good\" cholesterol, or HDL cholesterol was low. This can be improved with exercise.    Uric acid - Your uric acid level is now at goal at 5.7. We want this less than 6 to prevent further gout flares and erosion of the bones.    PSA - Your Prostate cancer screening lab results were normal.    Please call the clinic with any questions you may have.     Have a great day,    Dr. Cabello"

## 2023-10-06 ENCOUNTER — TELEPHONE (OUTPATIENT)
Dept: NEPHROLOGY | Facility: CLINIC | Age: 68
End: 2023-10-06
Payer: COMMERCIAL

## 2023-10-06 DIAGNOSIS — N18.31 CHRONIC KIDNEY DISEASE, STAGE 3A (H): Primary | ICD-10-CM

## 2023-10-06 NOTE — TELEPHONE ENCOUNTER
[facility] Call Center    Phone Message    May a detailed message be left on voicemail: yes     Reason for Call: Order(s): Other:   Reason for requested: Labs  Date needed: TBD- pt is going to schedule labs with primary labs  Provider name: Dr. Palma    Action Taken: Message routed to:  Other: neph    Travel Screening: Not Applicable

## 2023-10-10 ENCOUNTER — OFFICE VISIT (OUTPATIENT)
Dept: URGENT CARE | Facility: URGENT CARE | Age: 68
End: 2023-10-10
Payer: COMMERCIAL

## 2023-10-10 VITALS
WEIGHT: 211.2 LBS | HEART RATE: 83 BPM | BODY MASS INDEX: 31.43 KG/M2 | OXYGEN SATURATION: 96 % | SYSTOLIC BLOOD PRESSURE: 105 MMHG | DIASTOLIC BLOOD PRESSURE: 69 MMHG | RESPIRATION RATE: 22 BRPM | TEMPERATURE: 96.7 F

## 2023-10-10 DIAGNOSIS — B35.6 TINEA CRURIS: Primary | ICD-10-CM

## 2023-10-10 PROCEDURE — 99213 OFFICE O/P EST LOW 20 MIN: CPT

## 2023-10-10 RX ORDER — PRENATAL VIT 91/IRON/FOLIC/DHA 28-975-200
COMBINATION PACKAGE (EA) ORAL AT BEDTIME
Qty: 42 G | Refills: 0 | Status: SHIPPED | OUTPATIENT
Start: 2023-10-10 | End: 2024-03-14

## 2023-10-10 NOTE — PROGRESS NOTES
ASSESSMENT:  (B35.6) Tinea cruris  (primary encounter diagnosis)  Plan: terbinafine (LAMISIL) 1 % external cream    PLAN:  Tinea cruris patient instructions discussed and provided.  Informed the patient to use the cream as prescribed for 2 to 4 weeks.  We discussed that this may take up to 2 to 4 weeks to fully resolve.  Instructed him to follow-up with his primary care provider should symptoms persist.  Patient acknowledged his understanding of the above plan.    The use of Dragon/PowerMic dictation services may have been used to construct the content in this note; any grammatical or spelling errors are non-intentional. Please contact the author of this note directly if you are in need of any clarification.      SALENA Shine CNP      SUBJECTIVE:  Pedrito Negron is a 67 year old male who presents to the clinic today for a rash.  Onset of rash was 2 days ago.  Rash is gradual onset.  Location of the rash: groin.  Symptoms are moderate and rash seems to be not changing over the course of time.  Associated symptoms include: chaffing and burning feeling.  Therapies tried to improve the rash: Tinactin.  Recent new medication? No  Previous history of a similar rash? Yes: back in July he had a similar rash that resolved  Recent exposure history: none known    ROS:  Negative except noted above.    OBJECTIVE:  EXAM:  VITALS: /69   Pulse 83   Temp (!) 96.7  F (35.9  C) (Tympanic)   Resp 22   Wt 95.8 kg (211 lb 3.2 oz)   SpO2 96%   BMI 31.43 kg/m    GENERAL APPEARANCE: healthy, alert and no distress  SKIN: Rash description:    Location: groin    Distribution: localized  Lesion grouping: patches  Lesion type: patches and scales on leading edge  Color: red scaley and red  Nail exam: normal- no clubbing or nail changes  Hair exam: normal

## 2023-10-10 NOTE — PATIENT INSTRUCTIONS
Use the cream as prescribed for 2-4 weeks.  This may take up to 2-4 weeks to fully resolve.  Follow up with your primary care provider should symptoms persist.

## 2023-11-14 ENCOUNTER — LAB (OUTPATIENT)
Dept: LAB | Facility: CLINIC | Age: 68
End: 2023-11-14
Payer: COMMERCIAL

## 2023-11-14 DIAGNOSIS — N18.31 CHRONIC KIDNEY DISEASE, STAGE 3A (H): ICD-10-CM

## 2023-11-14 LAB
ALBUMIN MFR UR ELPH: <6 MG/DL
ALBUMIN SERPL BCG-MCNC: 4.4 G/DL (ref 3.5–5.2)
ALBUMIN UR-MCNC: NEGATIVE MG/DL
ANION GAP SERPL CALCULATED.3IONS-SCNC: 11 MMOL/L (ref 7–15)
APPEARANCE UR: CLEAR
BILIRUB UR QL STRIP: NEGATIVE
BUN SERPL-MCNC: 19.2 MG/DL (ref 8–23)
CALCIUM SERPL-MCNC: 9.5 MG/DL (ref 8.8–10.2)
CHLORIDE SERPL-SCNC: 101 MMOL/L (ref 98–107)
COLOR UR AUTO: ABNORMAL
CREAT SERPL-MCNC: 1.22 MG/DL (ref 0.67–1.17)
CREAT UR-MCNC: 59.3 MG/DL
CREAT UR-MCNC: 62.2 MG/DL
DEPRECATED HCO3 PLAS-SCNC: 27 MMOL/L (ref 22–29)
EGFRCR SERPLBLD CKD-EPI 2021: 65 ML/MIN/1.73M2
ERYTHROCYTE [DISTWIDTH] IN BLOOD BY AUTOMATED COUNT: 12.2 % (ref 10–15)
GLUCOSE SERPL-MCNC: 86 MG/DL (ref 70–99)
GLUCOSE UR STRIP-MCNC: NEGATIVE MG/DL
HCT VFR BLD AUTO: 41.3 % (ref 40–53)
HGB BLD-MCNC: 13.8 G/DL (ref 13.3–17.7)
HGB UR QL STRIP: NEGATIVE
KETONES UR STRIP-MCNC: NEGATIVE MG/DL
LEUKOCYTE ESTERASE UR QL STRIP: NEGATIVE
MCH RBC QN AUTO: 30.2 PG (ref 26.5–33)
MCHC RBC AUTO-ENTMCNC: 33.4 G/DL (ref 31.5–36.5)
MCV RBC AUTO: 90 FL (ref 78–100)
MICROALBUMIN UR-MCNC: <12 MG/L
MICROALBUMIN/CREAT UR: NORMAL MG/G{CREAT}
NITRATE UR QL: NEGATIVE
PH UR STRIP: 7.5 [PH] (ref 5–7)
PHOSPHATE SERPL-MCNC: 4 MG/DL (ref 2.5–4.5)
PLATELET # BLD AUTO: 277 10E3/UL (ref 150–450)
POTASSIUM SERPL-SCNC: 4.3 MMOL/L (ref 3.4–5.3)
PROT/CREAT 24H UR: NORMAL MG/G{CREAT}
RBC # BLD AUTO: 4.57 10E6/UL (ref 4.4–5.9)
RBC #/AREA URNS AUTO: NORMAL /HPF
SKIP: ABNORMAL
SODIUM SERPL-SCNC: 139 MMOL/L (ref 135–145)
SP GR UR STRIP: 1.01 (ref 1–1.03)
UROBILINOGEN UR STRIP-MCNC: NORMAL MG/DL
WBC # BLD AUTO: 6.9 10E3/UL (ref 4–11)
WBC #/AREA URNS AUTO: NORMAL /HPF

## 2023-11-14 PROCEDURE — 81001 URINALYSIS AUTO W/SCOPE: CPT

## 2023-11-14 PROCEDURE — 80069 RENAL FUNCTION PANEL: CPT

## 2023-11-14 PROCEDURE — 84156 ASSAY OF PROTEIN URINE: CPT

## 2023-11-14 PROCEDURE — 82043 UR ALBUMIN QUANTITATIVE: CPT

## 2023-11-14 PROCEDURE — 85027 COMPLETE CBC AUTOMATED: CPT

## 2023-11-14 PROCEDURE — 83970 ASSAY OF PARATHORMONE: CPT

## 2023-11-14 PROCEDURE — 82570 ASSAY OF URINE CREATININE: CPT

## 2023-11-14 PROCEDURE — 82306 VITAMIN D 25 HYDROXY: CPT

## 2023-11-14 PROCEDURE — 36415 COLL VENOUS BLD VENIPUNCTURE: CPT

## 2023-11-15 LAB
PTH-INTACT SERPL-MCNC: 32 PG/ML (ref 15–65)
VIT D+METAB SERPL-MCNC: 34 NG/ML (ref 20–50)

## 2023-11-16 ENCOUNTER — MYC REFILL (OUTPATIENT)
Dept: FAMILY MEDICINE | Facility: CLINIC | Age: 68
End: 2023-11-16
Payer: COMMERCIAL

## 2023-11-16 DIAGNOSIS — M10.9 GOUTY ARTHRITIS OF RIGHT GREAT TOE: ICD-10-CM

## 2023-11-16 RX ORDER — ALLOPURINOL 100 MG/1
100 TABLET ORAL DAILY
Qty: 90 TABLET | Refills: 3 | Status: SHIPPED | OUTPATIENT
Start: 2023-11-16 | End: 2024-09-09

## 2023-11-16 RX ORDER — ALLOPURINOL 100 MG/1
100 TABLET ORAL DAILY
Qty: 90 TABLET | Refills: 0 | OUTPATIENT
Start: 2023-11-16

## 2023-11-18 NOTE — PROGRESS NOTES
"    Nephrology Clinic Visit  Pedrito Negron MRN: [id number] YOB: 1955  Primary Care Provider: Taurus Lane  ----------------------------------------------------------------------------------------------------------------------  Visit 11/21/2023:  --BP Control: 110/69 today in office.   --Current Regimen: Metop succinate 50mg qDay, Losartan 25mg qDay  -DM Control: 5.6 (6/17/2022)  --Current Regimen: N/A  -Creatinine Trend: 1.22 (11/14/2023) from 1.26 (9/12/2023)  -Nocturia: 2x per night and emptying well now. He can tell when his bladder is full now. This is improved from 5/2023.   -Hematuria: No  -Nephrolithiasis: No  -NSAID Use (especially around 5/2023): Yes, around 5/2023, but is now off these  -Herbal/OTC Medication Use: Vitamin D and a multivitamin  -Family History of Kidney Disease: Sister has passed but was on dialysis prior to passing, Brother has passed as well and had kidney stones.     -Other:  --Health issues around 5/2023: Drop in eGFR to the 30's 5/2023. He reports severe urinary retention symptoms during that time (started towards the end of April 2023 - lasted until close to the end of May). Symptoms were severe at nighttime. He would wake up in the middle of the night and feel the sensation to pee but couldn't. Symptom were not as severe in the daytime. He reports having this happen years ago as well. \"I forgot how to pee!\". He didn't have a sensation of needing to urinate. 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 No new medications around that time.   -He was also having some issues with his lumbar spine hurting at this time and reports use of NSAIDs around that time.   -Had a gout flare around 7/2023 Uric acid 9/27/2023 < 6. On allopurinol 100mg qDay  -Neuropathy: Used to be a relatively heavy drinker from age 18 to 57 (6 pack and 4-5 shots).  No longer. Follows with Neurology. Now on Nortriptyline. Has numbness especially after working " "out. Feels \"lumpy\". Symptoms are worst in the evening after work but not at bedtime.   --Previous smoker: around 18 to 57. About 1 PPD.     Objective:  PAST MEDICAL HISTORY:  Past Medical History:   Diagnosis Date    Diverticulitis     Gout 07/2023    Hypertension        PAST SURGICAL HISTORY:  Past Surgical History:   Procedure Laterality Date    COLONOSCOPY WITH CO2 INSUFFLATION N/A 9/14/2020    Procedure: COLONOSCOPY, WITH CO2 INSUFFLATION;  Surgeon: Mleinda Saleem MD;  Location: [facility] OR    large bowel resection      diverticulitis    STENT  2018    cardiac       MEDICATIONS:  Current Outpatient Medications   Medication Instructions    allopurinol (ZYLOPRIM) 100 mg, Oral, DAILY    aspirin (ASA) 81 mg, Oral, DAILY    atorvastatin (LIPITOR) 80 MG tablet TAKE 1 TABLET(80 MG) BY MOUTH DAILY    Cholecalciferol (VITAMIN D3 PO) Oral, DAILY    ezetimibe (ZETIA) 10 MG tablet TAKE 1 TABLET BY MOUTH EVERY DAY    isosorbide mononitrate (IMDUR) 30 mg, Oral, DAILY    losartan (COZAAR) 25 mg, Oral, DAILY    metoprolol succinate ER (TOPROL XL) 50 mg, Oral, DAILY    Multiple Vitamin (MULTI-VITAMINS) TABS 1 tablet, Oral, DAILY, Centrum Men's 50 +    nitroGLYcerin (NITROSTAT) 0.4 MG sublingual tablet ONE TABLET UNDER TONGUE AS NEEDED FOR CHEST PAIN- IF NO RELIEF AFTER 5 MINUTES CALL 911; USE 1 TABLET EVERY 5 MINUTES- MAX OF 3 TABLETS    nortriptyline (PAMELOR) 50 mg, Oral, AT BEDTIME    omeprazole (PRILOSEC) 20 mg, Oral, 2 TIMES DAILY    terbinafine (LAMISIL) 1 % external cream Topical, AT BEDTIME       FAMILY MEDICAL HISTORY:   Family History   Problem Relation Age of Onset    Diabetes Sister        PHYSICAL EXAM:   /69   Pulse 82   Wt 95.7 kg (211 lb)   BMI 31.40 kg/m    GENERAL APPEARANCE: alert and no distress  EYES: nonicteric  ABDOMEN: soft, nontender, normal bowel sounds, no HSM   Extremities: No LE edema  MS: no evidence of inflammation in joints, no muscle tenderness  SKIN: no rash  NEURO: mentation intact and " "speech normal  PSYCH: affect normal    LABS REVIEWED BY ME:   ANEMIA  Recent Labs   Lab Test 11/14/23  1616 06/08/23  0853 06/17/22  1648 05/07/21  0717 09/29/20  1307 08/24/20  1255   HGB 13.8 14.8 12.8*  --  13.2* 9.1*   IRONSAT  --   --   --   --   --  7*   KAVEH  --   --   --  66  --   --        BMP  Recent Labs   Lab Test 11/14/23 1616 09/12/23  1601 08/17/23  1235 06/08/23  0853 05/07/21  0717 08/19/20  1520 07/10/20  0912 02/19/20  0759    139 138 138   < > 137 139 139   POTASSIUM 4.3 4.2 4.6 4.8   < > 4.6 4.7 4.5   CHLORIDE 101 103 102 101   < > 104 106 105   CO2 27 25 27 27   < > 29 29 28   ANIONGAP 11 11 9 10   < > 4 4 6   BUN 19.2 14.6 15.6 18.8   < > 13 14 22   CR 1.22* 1.26* 1.33* 1.30*   < > 1.10 1.19 1.16   GFRESTIMATED 65 62 58* 60*   < > 70 63 66   PROTTOTAL  --   --   --  7.6  --  7.4 7.7 8.0    < > = values in this interval not displayed.       CBC  Recent Labs   Lab Test 11/14/23 1616 07/12/23  1443 06/08/23  0853 06/17/22  1648 09/29/20  1307   HGB 13.8  --  14.8 12.8* 13.2*   WBC 6.9 7.6 6.2 6.8 6.0   HCT 41.3  --  43.7 38.0* 41.5   MCV 90  --  87 89 95     --  193 180 184       DIABETES  Recent Labs   Lab Test 06/17/22 1648 02/19/20  0759   A1C 5.6 5.1       HYPONATREMIANo lab results found.    Invalid input(s): \"UOSM\", \"OSM\"    MBD  Recent Labs   Lab Test 11/14/23 1616 09/12/23  1601 08/17/23  1235 06/08/23  0853 05/07/21  0717 08/19/20  1520 07/10/20  0912   PUSHPA 9.5 9.7 9.8 9.5   < > 8.9 8.2*   ALBUMIN 4.4  --   --  4.3  --  3.6 3.4   PHOS 4.0  --   --   --   --   --   --    PTHI 32  --   --   --   --   --   --     < > = values in this interval not displayed.        URINE STUDIES  Recent Labs   Lab Test 11/14/23  1616 09/27/23  1331   COLOR Light Yellow Yellow   APPEARANCE Clear Clear   URINEGLC Negative Negative   URINEBILI Negative Negative   URINEKETONE Negative Negative   SG 1.012 1.020   UBLD Negative Negative   URINEPH 7.5* 7.5*   PROTEIN Negative Negative   UROBILINOGEN "  --  0.2   NITRITE Negative Negative   LEUKEST Negative Negative   RBCU None Seen 0-2   WBCU None Seen 0-5     No lab results found.    ADDITIONAL LABS ORDERED/REVIEWED BY ME:  See below    Assessment/Plan  CKD Stage G2A1  Established care with [facility] 11/21/2023    Referred for MEL 5/1/2023 w/ creatinine up to 1.92 from baseline of 1.1-1.3. Improved from peak of 1.92 back to approximate baseline on 6/8/2023 and has remained stable leading up to establishing care with me.     UACR 11/14/2023 negative  UA 11/14/2023 w/o pyuria or hematuria  Renal US w/ duplex 9/14/2023 w/ potential R renal artery stenosis, R kidney 11.8cm, left kidney 10.3cm    CKD Etiology (Biopsy Proven: No):  -Hypertensive Nephrosclerosis  -Potential R renal artery stenosis  -Obstructive Nephropathy  -NSAID use    Plan:  -No further intervetion for potential renal artery stenosis needed at this time. If renal function declines or BP becomes difficult to control may want to pursue CTA to further evaluate. ASA 81mg qDay and Statin for empiric therapy are reasonable (already on Atorvastatin and ASA 81mg qDay). Smoking cessation important (stopped in 2010).   -Watch for urinary retention symptoms. He knows what to look out for and will reach out to me if he runs into issues  -No NSAIDs  -Optimize BP control: Continue Metop succinate 50mg qDay, Losartan 25mg qDay  -Continue Allopurinol 100mg qDa for gout prophylaxis.    -Low Na diet   -Discussed making sure he is up to date on colonoscopy and low dose CT scan for lung cancer.  -Remainder per problem below      Anemia of Renal Disease  Hemoglobin: 13.8 (11/14/2023)  Ferritin:  TSAT:    Plan:  -No need for IV iron/EPO      Lipid Management in CKD  KDIGO 2013 Guidelines recommend the following for patients with CKD:  Adults >/= 51 yo w/ CKD stage 1 or 2: Statin  Adults >/= 51 yo w/ CKD stage 3-5: Statin + Ezetimibe or Statin alone  Adults 18-49 + 1 of the following (CAD, DM, Ischemic stroke, 10 yr  ASCVD risk >10%): Statin    KDIGO guidelines recommend Simvastatin 20mg/Ezetimibe 10mg qDay for patients with CKD G3a-G5 (in line with the SHARP trial). If Simvastatin used alone, 40mg qDay is referenced.   Other options include: Atorvastatin 20mg qDay (4D trial) and Rosuvastatin 10mg qDay (RON trial)    Plan:  -Atorvastatin 80mg qDay      Metabolic Acidosis of Renal Disease  Bicarb: 27 (11/14/2023)    Plan:  -No need for NaHCO      Mineral Bone Disease  KDIGO 2017 Guidelines recommend the following for patients with CKD J3k-V2V:  -Treatments of CKD MBD should be based on serial assessments of phosphate, calcium, and PTH levels, considered together (Not Graded).  -Lowering elevated phosphate levels toward the normal range (2C) and guiding decisions about phosphate-lowering treatment should be based on progressively or persistently elevated serum phosphate (Not Graded). Limiting dietary phosphate intake in the treatment of hyperphosphatemiaalone or in combination with other treatments (2D). It is reasonable to consider phosphate source (e.g., animal, vegetable, additives) in making dietary recommendations (Not Graded).  -Avoiding hypercalcemia (2C)  -In patients with CKD G3a-G5 not on dialysis, the optimal PTH level is not known. However, we suggest that patients with levels of intact PTH progressively rising or persistently above the upper normal limit for the assay be evaluated for modifiable factors, including hyperphosphatemia, hypocalcemia, high phosphate intake, and vitamin D deficiency (2C).  -In adult patients with CKD G3a-G5 not on dialysis, we suggest that calcitriol and vitamin D analogs not be routinely used (2C). It is reasonable to reserve the use of calcitriol and vitamin D analogs for patients with CKD G4-G5 with severe and progressive hyperparathyroidism (Not Graded).    PTH: 32 (11/14/2023)  Phos: 4 (11/14/2023)  Calcium: 9.5 (11/14/2023)  Vitamin D: 34 (11/14/2023)    Plan:  -No need for phos  marisela    Other:  -Specialty Care Coordination Referral - CKD G1-G3b w/o imminent RRT planning/needs (Yes/no, Date Referral Placed): No  -Med Therapy Management Referral (Yes/No, Date of Referral): No    Return to clinic: 4 months    Terrell Palma MD   of Medicine  Division of Nephrology and Hypertension  [facility]    45 minutes spent on the date of the encounter doing chart review, history and exam, documentation and further activities as noted above

## 2023-11-21 ENCOUNTER — OFFICE VISIT (OUTPATIENT)
Dept: NEPHROLOGY | Facility: CLINIC | Age: 68
End: 2023-11-21
Attending: STUDENT IN AN ORGANIZED HEALTH CARE EDUCATION/TRAINING PROGRAM
Payer: COMMERCIAL

## 2023-11-21 VITALS
BODY MASS INDEX: 31.4 KG/M2 | SYSTOLIC BLOOD PRESSURE: 110 MMHG | HEART RATE: 82 BPM | WEIGHT: 211 LBS | DIASTOLIC BLOOD PRESSURE: 69 MMHG

## 2023-11-21 DIAGNOSIS — N18.31 CHRONIC KIDNEY DISEASE, STAGE 3A (H): Primary | ICD-10-CM

## 2023-11-21 PROCEDURE — 99204 OFFICE O/P NEW MOD 45 MIN: CPT | Performed by: STUDENT IN AN ORGANIZED HEALTH CARE EDUCATION/TRAINING PROGRAM

## 2023-11-21 NOTE — PATIENT INSTRUCTIONS
"It was a pleasure to see you in nephrology clinic today. I've included a brief summary of our discussion and care plan from today's visit below.  _______________________________________________________________________    My recommendations are summarized as follows:  -Keep a Blood Pressure log. Please make sure that you are using a validated blood pressure device (check \"www.validatebp.org\").  -Avoid all NSAID's. Examples include Ibuprofen (Advil, Motrin), naprosyn (Aleve), celebrex among others. Acetaminophen (Tylenol) is ok with maximum dose in 24 hours of 3000mg.  -Healthy lifestyle measures will keep your kidney's functioning at their current best. This includes regular exercise, maintaining a healthy body weight and smoking cessation.   -Reasons to reach out to me right away: 1. Feeling like you have to urinate but can't get stream started. 2. Feeling like you need to strain for a long time to get stream started. 3. Excessive dribbling at the end of urination. 4. Stress incontinence (dribbling when changing position).   --Do not try to \"tough out\" urinary retention symptoms at home! We cannot let another severe acute kidney injury like the one you had in May happen again.   -Reduce alcohol and red meat intake to reduce risk of further gout flares.   -Please reach out to Dr Cabello to discuss what happened to your low dose CT lung cancer screening test.   -Try to maintain a low sodium diet. This would be about 2.4g (2400mg) or less.   -I recommend about 3199-3282 international unit(s) of vitamin D per day. Please check your home supplement and make sure you aren't taking too much more than this.     Please return to Nephrology Clinic in 4 months to review your progress.     Who do I call with any questions after my visit?  There are multiple ways to contact your nephrology care team:    -To schedule or reschedule an appointment, please call [phone].  -Reach out via [facility]. These messages are answered by your " nurse or doctor during business hours and typically in 1-2 days. MyChart messages are best for quick questions/clarifications/updates. Frequently, your doctor or nurse will recommend setting up a follow up appointment to address any significant questions/concerns.  -For urgent questions after business hours, you may reach the on-call Nephrology Fellow by contacting the [facility]  at [phone].    To schedule imaging:   -Please call [phone]     To schedule your lab appointment at [facility]  -Please call [phone]    Sincerely,    Dr. Terrell Palma   of Medicine  Division of Nephrology and Hypertension  [facility]

## 2023-11-21 NOTE — LETTER
"11/21/2023       RE: Pedrito Negron  [id number]  [place] [address]     Dear Colleague,    Thank you for referring your patient, Pedrito Negron, to the [facility]. Please see a copy of my visit note below.        Nephrology Clinic Visit  Pedrito Negron MRN: [id number] YOB: 1955  Primary Care Provider: Taurus Lane  ----------------------------------------------------------------------------------------------------------------------  Visit 11/21/2023:  --BP Control: 110/69 today in office.   --Current Regimen: Metop succinate 50mg qDay, Losartan 25mg qDay  -DM Control: 5.6 (6/17/2022)  --Current Regimen: N/A  -Creatinine Trend: 1.22 (11/14/2023) from 1.26 (9/12/2023)  -Nocturia: 2x per night and emptying well now. He can tell when his bladder is full now. This is improved from 5/2023.   -Hematuria: No  -Nephrolithiasis: No  -NSAID Use (especially around 5/2023): Yes, around 5/2023, but is now off these  -Herbal/OTC Medication Use: Vitamin D and a multivitamin  -Family History of Kidney Disease: Sister has passed but was on dialysis prior to passing, Brother has passed as well and had kidney stones.     -Other:  --Health issues around 5/2023: Drop in eGFR to the 30's 5/2023. He reports severe urinary retention symptoms during that time (started towards the end of April 2023 - lasted until close to the end of May). Symptoms were severe at nighttime. He would wake up in the middle of the night and feel the sensation to pee but couldn't. Symptom were not as severe in the daytime. He reports having this happen years ago as well. \"I forgot how to pee!\". He didn't have a sensation of needing to urinate. 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 No new medications around that time.   -He was also having some issues with his lumbar spine hurting at this time and reports use of " "NSAIDs around that time.   -Had a gout flare around 7/2023 Uric acid 9/27/2023 < 6. On allopurinol 100mg qDay  -Neuropathy: Used to be a relatively heavy drinker from age 18 to 57 (6 pack and 4-5 shots).  No longer. Follows with Neurology. Now on Nortriptyline. Has numbness especially after working out. Feels \"lumpy\". Symptoms are worst in the evening after work but not at bedtime.   --Previous smoker: around 18 to 57. About 1 PPD.     Objective:  PAST MEDICAL HISTORY:  Past Medical History:   Diagnosis Date     Diverticulitis      Gout 07/2023     Hypertension        PAST SURGICAL HISTORY:  Past Surgical History:   Procedure Laterality Date     COLONOSCOPY WITH CO2 INSUFFLATION N/A 9/14/2020    Procedure: COLONOSCOPY, WITH CO2 INSUFFLATION;  Surgeon: Melinda Saleem MD;  Location: [facility] OR     large bowel resection      diverticulitis     STENT  2018    cardiac       MEDICATIONS:  Current Outpatient Medications   Medication Instructions     allopurinol (ZYLOPRIM) 100 mg, Oral, DAILY     aspirin (ASA) 81 mg, Oral, DAILY     atorvastatin (LIPITOR) 80 MG tablet TAKE 1 TABLET(80 MG) BY MOUTH DAILY     Cholecalciferol (VITAMIN D3 PO) Oral, DAILY     ezetimibe (ZETIA) 10 MG tablet TAKE 1 TABLET BY MOUTH EVERY DAY     isosorbide mononitrate (IMDUR) 30 mg, Oral, DAILY     losartan (COZAAR) 25 mg, Oral, DAILY     metoprolol succinate ER (TOPROL XL) 50 mg, Oral, DAILY     Multiple Vitamin (MULTI-VITAMINS) TABS 1 tablet, Oral, DAILY, Centrum Men's 50 +     nitroGLYcerin (NITROSTAT) 0.4 MG sublingual tablet ONE TABLET UNDER TONGUE AS NEEDED FOR CHEST PAIN- IF NO RELIEF AFTER 5 MINUTES CALL 911; USE 1 TABLET EVERY 5 MINUTES- MAX OF 3 TABLETS     nortriptyline (PAMELOR) 50 mg, Oral, AT BEDTIME     omeprazole (PRILOSEC) 20 mg, Oral, 2 TIMES DAILY     terbinafine (LAMISIL) 1 % external cream Topical, AT BEDTIME       FAMILY MEDICAL HISTORY:   Family History   Problem Relation Age of Onset     Diabetes Sister        PHYSICAL EXAM: " "  /69   Pulse 82   Wt 95.7 kg (211 lb)   BMI 31.40 kg/m    GENERAL APPEARANCE: alert and no distress  EYES: nonicteric  ABDOMEN: soft, nontender, normal bowel sounds, no HSM   Extremities: No LE edema  MS: no evidence of inflammation in joints, no muscle tenderness  SKIN: no rash  NEURO: mentation intact and speech normal  PSYCH: affect normal    LABS REVIEWED BY ME:   ANEMIA  Recent Labs   Lab Test 11/14/23  1616 06/08/23  0853 06/17/22  1648 05/07/21  0717 09/29/20  1307 08/24/20  1255   HGB 13.8 14.8 12.8*  --  13.2* 9.1*   IRONSAT  --   --   --   --   --  7*   KAVEH  --   --   --  66  --   --        BMP  Recent Labs   Lab Test 11/14/23  1616 09/12/23  1601 08/17/23  1235 06/08/23  0853 05/07/21  0717 08/19/20  1520 07/10/20  0912 02/19/20  0759    139 138 138   < > 137 139 139   POTASSIUM 4.3 4.2 4.6 4.8   < > 4.6 4.7 4.5   CHLORIDE 101 103 102 101   < > 104 106 105   CO2 27 25 27 27   < > 29 29 28   ANIONGAP 11 11 9 10   < > 4 4 6   BUN 19.2 14.6 15.6 18.8   < > 13 14 22   CR 1.22* 1.26* 1.33* 1.30*   < > 1.10 1.19 1.16   GFRESTIMATED 65 62 58* 60*   < > 70 63 66   PROTTOTAL  --   --   --  7.6  --  7.4 7.7 8.0    < > = values in this interval not displayed.       CBC  Recent Labs   Lab Test 11/14/23  1616 07/12/23  1443 06/08/23  0853 06/17/22  1648 09/29/20  1307   HGB 13.8  --  14.8 12.8* 13.2*   WBC 6.9 7.6 6.2 6.8 6.0   HCT 41.3  --  43.7 38.0* 41.5   MCV 90  --  87 89 95     --  193 180 184       DIABETES  Recent Labs   Lab Test 06/17/22  1648 02/19/20  0759   A1C 5.6 5.1       HYPONATREMIANo lab results found.    Invalid input(s): \"UOSM\", \"OSM\"    MBD  Recent Labs   Lab Test 11/14/23  1616 09/12/23  1601 08/17/23  1235 06/08/23  0853 05/07/21  0717 08/19/20  1520 07/10/20  0912   PUSHPA 9.5 9.7 9.8 9.5   < > 8.9 8.2*   ALBUMIN 4.4  --   --  4.3  --  3.6 3.4   PHOS 4.0  --   --   --   --   --   --    PTHI 32  --   --   --   --   --   --     < > = values in this interval not displayed.    "     URINE STUDIES  Recent Labs   Lab Test 11/14/23  1616 09/27/23  1331   COLOR Light Yellow Yellow   APPEARANCE Clear Clear   URINEGLC Negative Negative   URINEBILI Negative Negative   URINEKETONE Negative Negative   SG 1.012 1.020   UBLD Negative Negative   URINEPH 7.5* 7.5*   PROTEIN Negative Negative   UROBILINOGEN  --  0.2   NITRITE Negative Negative   LEUKEST Negative Negative   RBCU None Seen 0-2   WBCU None Seen 0-5     No lab results found.    ADDITIONAL LABS ORDERED/REVIEWED BY ME:  See below    Assessment/Plan  CKD Stage G2A1  Established care with [facility] 11/21/2023    Referred for MEL 5/1/2023 w/ creatinine up to 1.92 from baseline of 1.1-1.3. Improved from peak of 1.92 back to approximate baseline on 6/8/2023 and has remained stable leading up to establishing care with me.     UACR 11/14/2023 negative  UA 11/14/2023 w/o pyuria or hematuria  Renal US w/ duplex 9/14/2023 w/ potential R renal artery stenosis, R kidney 11.8cm, left kidney 10.3cm    CKD Etiology (Biopsy Proven: No):  -Hypertensive Nephrosclerosis  -Potential R renal artery stenosis  -Obstructive Nephropathy  -NSAID use    Plan:  -No further intervetion for potential renal artery stenosis needed at this time. If renal function declines or BP becomes difficult to control may want to pursue CTA to further evaluate. ASA 81mg qDay and Statin for empiric therapy are reasonable (already on Atorvastatin and ASA 81mg qDay). Smoking cessation important (stopped in 2010).   -Watch for urinary retention symptoms. He knows what to look out for and will reach out to me if he runs into issues  -No NSAIDs  -Optimize BP control: Continue Metop succinate 50mg qDay, Losartan 25mg qDay  -Continue Allopurinol 100mg qDa for gout prophylaxis.    -Low Na diet   -Discussed making sure he is up to date on colonoscopy and low dose CT scan for lung cancer.  -Remainder per problem below      Anemia of Renal Disease  Hemoglobin: 13.8  (11/14/2023)  Ferritin:  TSAT:    Plan:  -No need for IV iron/EPO      Lipid Management in CKD  KDIGO 2013 Guidelines recommend the following for patients with CKD:  Adults >/= 51 yo w/ CKD stage 1 or 2: Statin  Adults >/= 51 yo w/ CKD stage 3-5: Statin + Ezetimibe or Statin alone  Adults 18-49 + 1 of the following (CAD, DM, Ischemic stroke, 10 yr ASCVD risk >10%): Statin    KDIGO guidelines recommend Simvastatin 20mg/Ezetimibe 10mg qDay for patients with CKD G3a-G5 (in line with the SHARP trial). If Simvastatin used alone, 40mg qDay is referenced.   Other options include: Atorvastatin 20mg qDay (4D trial) and Rosuvastatin 10mg qDay (RON trial)    Plan:  -Atorvastatin 80mg qDay      Metabolic Acidosis of Renal Disease  Bicarb: 27 (11/14/2023)    Plan:  -No need for NaHCO      Mineral Bone Disease  KDIGO 2017 Guidelines recommend the following for patients with CKD Y4m-H4A:  -Treatments of CKD MBD should be based on serial assessments of phosphate, calcium, and PTH levels, considered together (Not Graded).  -Lowering elevated phosphate levels toward the normal range (2C) and guiding decisions about phosphate-lowering treatment should be based on progressively or persistently elevated serum phosphate (Not Graded). Limiting dietary phosphate intake in the treatment of hyperphosphatemiaalone or in combination with other treatments (2D). It is reasonable to consider phosphate source (e.g., animal, vegetable, additives) in making dietary recommendations (Not Graded).  -Avoiding hypercalcemia (2C)  -In patients with CKD G3a-G5 not on dialysis, the optimal PTH level is not known. However, we suggest that patients with levels of intact PTH progressively rising or persistently above the upper normal limit for the assay be evaluated for modifiable factors, including hyperphosphatemia, hypocalcemia, high phosphate intake, and vitamin D deficiency (2C).  -In adult patients with CKD G3a-G5 not on dialysis, we suggest that  calcitriol and vitamin D analogs not be routinely used (2C). It is reasonable to reserve the use of calcitriol and vitamin D analogs for patients with CKD G4-G5 with severe and progressive hyperparathyroidism (Not Graded).    PTH: 32 (11/14/2023)  Phos: 4 (11/14/2023)  Calcium: 9.5 (11/14/2023)  Vitamin D: 34 (11/14/2023)    Plan:  -No need for phos binder    Other:  -Specialty Care Coordination Referral - CKD G1-G3b w/o imminent RRT planning/needs (Yes/no, Date Referral Placed): No  -Med Therapy Management Referral (Yes/No, Date of Referral): No    Return to clinic: 4 months    Terrell Palma MD   of Medicine  Division of Nephrology and Hypertension  [facility]    45 minutes spent on the date of the encounter doing chart review, history and exam, documentation and further activities as noted above      Again, thank you for allowing me to participate in the care of your patient.      Sincerely,    Terrell Palma MD

## 2024-02-19 DIAGNOSIS — M79.2 NERVE PAIN: ICD-10-CM

## 2024-02-19 NOTE — TELEPHONE ENCOUNTER
Pending Prescriptions:                       Disp   Refills    nortriptyline (PAMELOR) 50 MG capsule     90 cap*1            Sig: Take 1 capsule (50 mg) by mouth at bedtime   Start: 08/25/2023     [facility]    Pt's last office visit: 06/08/2023    Next scheduled office visit: 06/12/2024      Per the RN/LPN medication refill protocol, writer is unable to refill this request.

## 2024-02-20 RX ORDER — NORTRIPTYLINE HYDROCHLORIDE 50 MG/1
50 CAPSULE ORAL AT BEDTIME
Qty: 90 CAPSULE | Refills: 1 | Status: SHIPPED | OUTPATIENT
Start: 2024-02-20 | End: 2024-06-12

## 2024-03-04 ENCOUNTER — LAB (OUTPATIENT)
Dept: LAB | Facility: CLINIC | Age: 69
End: 2024-03-04
Payer: COMMERCIAL

## 2024-03-04 DIAGNOSIS — N18.31 CHRONIC KIDNEY DISEASE, STAGE 3A (H): ICD-10-CM

## 2024-03-04 LAB
ANION GAP SERPL CALCULATED.3IONS-SCNC: 9 MMOL/L (ref 7–15)
BUN SERPL-MCNC: 20 MG/DL (ref 8–23)
CALCIUM SERPL-MCNC: 9.3 MG/DL (ref 8.8–10.2)
CHLORIDE SERPL-SCNC: 104 MMOL/L (ref 98–107)
CREAT SERPL-MCNC: 1.24 MG/DL (ref 0.67–1.17)
DEPRECATED HCO3 PLAS-SCNC: 28 MMOL/L (ref 22–29)
EGFRCR SERPLBLD CKD-EPI 2021: 63 ML/MIN/1.73M2
GLUCOSE SERPL-MCNC: 81 MG/DL (ref 70–99)
POTASSIUM SERPL-SCNC: 4.3 MMOL/L (ref 3.4–5.3)
SODIUM SERPL-SCNC: 141 MMOL/L (ref 135–145)

## 2024-03-04 PROCEDURE — 36415 COLL VENOUS BLD VENIPUNCTURE: CPT

## 2024-03-04 PROCEDURE — 80048 BASIC METABOLIC PNL TOTAL CA: CPT

## 2024-03-04 NOTE — PROGRESS NOTES
"    Nephrology Clinic Visit  Pedrito Negron MRN: [id number] YOB: 1955  Primary Care Provider: Taurus Lane  ----------------------------------------------------------------------------------------------------------------------  Visit 3/5/2024:  -BP Control: 123/78 today in office. His home readings are very similar to   --Current Regimen: Metop succinate 50mg qDay, Losartan 25mg qDay  -DM Control: 5.6 (6/17/2022)  --Current Regimen: N/A  -Creatinine Trend: 1.24 (3/4/2024) from 1.22 (11/14/2023) from 1.26 (9/12/2023)  -No issues with urinary retention since last appt. Discussed symptoms to watch for.  -Discussed watching for SE from Allopurinol  -Discussed potential use of Propranolol for Essential tremor.     Visit 11/21/2023:  --BP Control: 110/69 today in office.   --Current Regimen: Metop succinate 50mg qDay, Losartan 25mg qDay  -DM Control: 5.6 (6/17/2022)  --Current Regimen: N/A  -Creatinine Trend: 1.22 (11/14/2023) from 1.26 (9/12/2023)  -Nocturia: 2x per night and emptying well now. He can tell when his bladder is full now. This is improved from 5/2023.   -Hematuria: No  -Nephrolithiasis: No  -NSAID Use (especially around 5/2023): Yes, around 5/2023, but is now off these  -Herbal/OTC Medication Use: Vitamin D and a multivitamin  -Family History of Kidney Disease: Sister has passed but was on dialysis prior to passing, Brother has passed as well and had kidney stones.     -Other:  --Health issues around 5/2023: Drop in eGFR to the 30's 5/2023. He reports severe urinary retention symptoms during that time (started towards the end of April 2023 - lasted until close to the end of May). Symptoms were severe at nighttime. He would wake up in the middle of the night and feel the sensation to pee but couldn't. Symptom were not as severe in the daytime. He reports having this happen years ago as well. \"I forgot how to pee!\". He didn't have a sensation of needing to urinate. 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 No new medications around that time.   -He was also having some issues with his lumbar spine hurting at this time and reports use of NSAIDs around that time.   -Had a gout flare around 7/2023 Uric acid 9/27/2023 < 6. On allopurinol 100mg qDay  -Neuropathy: Used to be a relatively heavy drinker from age 18 to 57 (6 pack and 4-5 shots).  No longer. Follows with Neurology. Now on Nortriptyline. Has numbness especially after working out. Feels \"lumpy\". Symptoms are worst in the evening after work but not at bedtime.   --Previous smoker: around 18 to 57. About 1 PPD.     Objective:  PAST MEDICAL HISTORY:  Past Medical History:   Diagnosis Date    Diverticulitis     Gout 07/2023    Hypertension        PAST SURGICAL HISTORY:  Past Surgical History:   Procedure Laterality Date    COLONOSCOPY WITH CO2 INSUFFLATION N/A 9/14/2020    Procedure: COLONOSCOPY, WITH CO2 INSUFFLATION;  Surgeon: Melinda Saleem MD;  Location: [facility] OR    large bowel resection      diverticulitis    STENT  2018    cardiac       MEDICATIONS:  Current Outpatient Medications   Medication Instructions    allopurinol (ZYLOPRIM) 100 mg, Oral, DAILY    aspirin (ASA) 81 mg, Oral, DAILY    atorvastatin (LIPITOR) 80 MG tablet TAKE 1 TABLET(80 MG) BY MOUTH DAILY    Cholecalciferol (VITAMIN D3 PO) Oral, DAILY    ezetimibe (ZETIA) 10 MG tablet TAKE 1 TABLET BY MOUTH EVERY DAY    isosorbide mononitrate (IMDUR) 30 mg, Oral, DAILY    losartan (COZAAR) 25 mg, Oral, DAILY    metoprolol succinate ER (TOPROL XL) 50 mg, Oral, DAILY    Multiple Vitamin (MULTI-VITAMINS) TABS 1 tablet, Oral, DAILY, Centrum Men's 50 +    nitroGLYcerin (NITROSTAT) 0.4 MG sublingual tablet ONE TABLET UNDER TONGUE AS NEEDED FOR CHEST PAIN- IF NO RELIEF AFTER 5 MINUTES CALL 911; USE 1 TABLET EVERY 5 MINUTES- MAX OF 3 TABLETS    nortriptyline (PAMELOR) 50 mg, Oral, AT BEDTIME    omeprazole (PRILOSEC) 20 mg, Oral, 2 TIMES DAILY    " "terbinafine (LAMISIL) 1 % external cream Topical, AT BEDTIME       FAMILY MEDICAL HISTORY:   Family History   Problem Relation Age of Onset    Diabetes Sister        PHYSICAL EXAM:   /78   Pulse 94   Wt 102.1 kg (225 lb)   BMI 33.48 kg/m    GENERAL APPEARANCE: alert and no distress  EYES: nonicteric  ABDOMEN: soft, nontender, normal bowel sounds, no HSM   Extremities: No LE edema  MS: no evidence of inflammation in joints, no muscle tenderness  SKIN: no rash  NEURO: mentation intact and speech normal  PSYCH: affect normal    LABS REVIEWED BY ME:   ANEMIA  Recent Labs   Lab Test 11/14/23  1616 06/08/23  0853 06/17/22  1648 05/07/21  0717 09/29/20  1307 08/24/20  1255   HGB 13.8 14.8 12.8*  --  13.2* 9.1*   IRONSAT  --   --   --   --   --  7*   KAVEH  --   --   --  66  --   --        BMP  Recent Labs   Lab Test 11/14/23  1616 09/12/23  1601 08/17/23  1235 06/08/23  0853 05/07/21  0717 08/19/20  1520 07/10/20  0912 02/19/20  0759    139 138 138   < > 137 139 139   POTASSIUM 4.3 4.2 4.6 4.8   < > 4.6 4.7 4.5   CHLORIDE 101 103 102 101   < > 104 106 105   CO2 27 25 27 27   < > 29 29 28   ANIONGAP 11 11 9 10   < > 4 4 6   BUN 19.2 14.6 15.6 18.8   < > 13 14 22   CR 1.22* 1.26* 1.33* 1.30*   < > 1.10 1.19 1.16   GFRESTIMATED 65 62 58* 60*   < > 70 63 66   PROTTOTAL  --   --   --  7.6  --  7.4 7.7 8.0    < > = values in this interval not displayed.       CBC  Recent Labs   Lab Test 11/14/23  1616 07/12/23  1443 06/08/23  0853 06/17/22  1648 09/29/20  1307   HGB 13.8  --  14.8 12.8* 13.2*   WBC 6.9 7.6 6.2 6.8 6.0   HCT 41.3  --  43.7 38.0* 41.5   MCV 90  --  87 89 95     --  193 180 184       DIABETES  Recent Labs   Lab Test 06/17/22  1648 02/19/20  0759   A1C 5.6 5.1       HYPONATREMIANo lab results found.    Invalid input(s): \"UOSM\", \"OSM\"    MBD  Recent Labs   Lab Test 11/14/23  1616 09/12/23  1601 08/17/23  1235 06/08/23  0853 05/07/21  0717 08/19/20  1520 07/10/20  0912   PUSHPA 9.5 9.7 9.8 9.5   < > " 8.9 8.2*   ALBUMIN 4.4  --   --  4.3  --  3.6 3.4   PHOS 4.0  --   --   --   --   --   --    PTHI 32  --   --   --   --   --   --     < > = values in this interval not displayed.        URINE STUDIES  Recent Labs   Lab Test 11/14/23  1616 09/27/23  1331   COLOR Light Yellow Yellow   APPEARANCE Clear Clear   URINEGLC Negative Negative   URINEBILI Negative Negative   URINEKETONE Negative Negative   SG 1.012 1.020   UBLD Negative Negative   URINEPH 7.5* 7.5*   PROTEIN Negative Negative   UROBILINOGEN  --  0.2   NITRITE Negative Negative   LEUKEST Negative Negative   RBCU None Seen 0-2   WBCU None Seen 0-5     No lab results found.    ADDITIONAL LABS ORDERED/REVIEWED BY ME:  See below    Assessment/Plan  CKD Stage G2A1  Established care with [facility] 11/21/2023    Referred for MEL 5/1/2023 w/ creatinine up to 1.92 from baseline of 1.1-1.3. Improved from peak of 1.92 back to approximate baseline on 6/8/2023 and has remained stable leading up to establishing care with me.     UACR 11/14/2023 negative  UA 11/14/2023 w/o pyuria or hematuria  Renal US w/ duplex 9/14/2023 w/ potential R renal artery stenosis, R kidney 11.8cm, left kidney 10.3cm    CKD Etiology (Biopsy Proven: No):  -Hypertensive Nephrosclerosis  -Potential R renal artery stenosis  -Obstructive Nephropathy  -NSAID use    Plan:  -No further intervetion for potential renal artery stenosis needed at this time. If renal function declines or BP becomes difficult to control may want to pursue CTA to further evaluate. ASA 81mg qDay and Statin for empiric therapy are reasonable (already on Atorvastatin and ASA 81mg qDay). Smoking cessation important (stopped in 2010).   -Watch for urinary retention symptoms. He knows what to look out for and will reach out to me if he runs into issues  -No NSAIDs  -Optimize BP control: Continue Metop succinate 50mg qDay (consider adjustment to Propranolol for ET), Losartan 25mg qDay  -Continue Allopurinol 100mg qDay for gout  prophylaxis.    -Low Na diet discussed again  -Discussed making sure he is up to date low dose CT scan for lung cancer.  -Remainder per problem below      Anemia of Renal Disease  Hemoglobin: 13.8 (11/14/2023)  Ferritin:  TSAT:    Plan:  -No need for IV iron/EPO      Lipid Management in CKD  KDIGO 2013 Guidelines recommend the following for patients with CKD:  Adults >/= 49 yo w/ CKD stage 1 or 2: Statin  Adults >/= 49 yo w/ CKD stage 3-5: Statin + Ezetimibe or Statin alone  Adults 18-49 + 1 of the following (CAD, DM, Ischemic stroke, 10 yr ASCVD risk >10%): Statin    KDIGO guidelines recommend Simvastatin 20mg/Ezetimibe 10mg qDay for patients with CKD G3a-G5 (in line with the SHARP trial). If Simvastatin used alone, 40mg qDay is referenced.   Other options include: Atorvastatin 20mg qDay (4D trial) and Rosuvastatin 10mg qDay (RON trial)    Plan:  -Atorvastatin 80mg qDay      Metabolic Acidosis of Renal Disease  Bicarb: 28 (3/4/2024)    Plan:  -No need for NaHCO      Mineral Bone Disease  KDIGO 2017 Guidelines recommend the following for patients with CKD H6r-S5X:  -Treatments of CKD MBD should be based on serial assessments of phosphate, calcium, and PTH levels, considered together (Not Graded).  -Lowering elevated phosphate levels toward the normal range (2C) and guiding decisions about phosphate-lowering treatment should be based on progressively or persistently elevated serum phosphate (Not Graded). Limiting dietary phosphate intake in the treatment of hyperphosphatemiaalone or in combination with other treatments (2D). It is reasonable to consider phosphate source (e.g., animal, vegetable, additives) in making dietary recommendations (Not Graded).  -Avoiding hypercalcemia (2C)  -In patients with CKD G3a-G5 not on dialysis, the optimal PTH level is not known. However, we suggest that patients with levels of intact PTH progressively rising or persistently above the upper normal limit for the assay be evaluated  for modifiable factors, including hyperphosphatemia, hypocalcemia, high phosphate intake, and vitamin D deficiency (2C).  -In adult patients with CKD G3a-G5 not on dialysis, we suggest that calcitriol and vitamin D analogs not be routinely used (2C). It is reasonable to reserve the use of calcitriol and vitamin D analogs for patients with CKD G4-G5 with severe and progressive hyperparathyroidism (Not Graded).    PTH: 32 (11/14/2023)  Phos: 4 (11/14/2023)  Calcium: 9.3 (3/4/2024)  Vitamin D: 34 (11/14/2023)    Plan:  -No need for phos binder    Other:  -Specialty Care Coordination Referral - CKD G1-G3b w/o imminent RRT planning/needs (Yes/no, Date Referral Placed): No  -Med Therapy Management Referral (Yes/No, Date of Referral): No    Return to clinic: 1 Year    Terrell Palma MD   of Medicine  Division of Nephrology and Hypertension  [facility]    21 minutes spent on the date of the encounter doing chart review, history and exam, documentation and further activities as noted above

## 2024-03-05 ENCOUNTER — OFFICE VISIT (OUTPATIENT)
Dept: NEPHROLOGY | Facility: CLINIC | Age: 69
End: 2024-03-05
Payer: COMMERCIAL

## 2024-03-05 VITALS
DIASTOLIC BLOOD PRESSURE: 78 MMHG | WEIGHT: 225 LBS | HEART RATE: 94 BPM | SYSTOLIC BLOOD PRESSURE: 123 MMHG | BODY MASS INDEX: 33.48 KG/M2

## 2024-03-05 DIAGNOSIS — N18.2 STAGE 2 CHRONIC KIDNEY DISEASE: Primary | ICD-10-CM

## 2024-03-05 PROCEDURE — 99214 OFFICE O/P EST MOD 30 MIN: CPT | Performed by: STUDENT IN AN ORGANIZED HEALTH CARE EDUCATION/TRAINING PROGRAM

## 2024-03-05 NOTE — PATIENT INSTRUCTIONS
"It was a pleasure to see you in nephrology clinic today. I've included a brief summary of our discussion and care plan from today's visit below.  _______________________________________________________________________    My recommendations are summarized as follows:  -Keep a Blood Pressure log. Please make sure that you are using a validated blood pressure device (check \"www.validatebp.org\").  -Avoid all NSAID's. Examples include Ibuprofen (Advil, Motrin), naprosyn (Aleve), celebrex among others. Acetaminophen (Tylenol) is ok with maximum dose in 24 hours of 3000mg.  -Healthy lifestyle measures will keep your kidney's functioning at their current best. This includes regular exercise, maintaining a healthy body weight and smoking cessation.   -See what Dr Cabello thinks about Propranolol instead of Metoprolol for your essential tremor  -Discuss low dose CT with Dr Cabello (make sure you are up to date with these screenings!)    Please return to Nephrology Clinic in 1 year to review your progress.     Who do I call with any questions after my visit?  There are multiple ways to contact your nephrology care team:    -To schedule or reschedule an appointment, please call [phone].  -To contact my nurse Chen, please call [phone]  -Reach out via [employer]. These messages are answered by your nurse or doctor during business hours and typically in 1-2 days. [employer] messages are best for quick questions/clarifications/updates. Frequently, your doctor or nurse will recommend setting up a follow up appointment to address any significant questions/concerns.  -For urgent questions after business hours, you may reach the on-call Nephrology Fellow by contacting the [facility]  at [phone].    To schedule imaging:   -Please call [phone]     To schedule your lab appointment at [facility]  -Please call [phone]    Sincerely,    Dr. Terrell Palma   of " Medicine  Division of Nephrology and Hypertension  [facility]

## 2024-03-07 ENCOUNTER — TELEPHONE (OUTPATIENT)
Dept: FAMILY MEDICINE | Facility: CLINIC | Age: 69
End: 2024-03-07

## 2024-03-07 ENCOUNTER — MYC MEDICAL ADVICE (OUTPATIENT)
Dept: FAMILY MEDICINE | Facility: CLINIC | Age: 69
End: 2024-03-07

## 2024-03-07 NOTE — TELEPHONE ENCOUNTER
"RN Triage    Patient Contact    Attempt # 1    Was call answered?  No.  Left message on voicemail with information to call me back.    Upon patient callback please advise of the below:    \"Provider is out today. Routing to RN to triage as visit notes state \"Gouty foot- metatarsal, cataracts \"     Pt appt was at 2pm, I have canceled that appt. No other providers have opening as they are virtual. Unsure that virtual visit would be appropriate.      Have not contacted pt yet as RN triage is needed     Did grab pt the 3:30 (arrive at 3:10 ) spot for Irineo tomorrow if his symptoms are appropriate to wait\"    MyChart also sent.     LOBITO LugoN, RN  [facility] ~ Registered Nurse  Clinic Triage ~ [place]  March 7, 2024            "

## 2024-03-07 NOTE — TELEPHONE ENCOUNTER
"Provider is out today. Routing to RN to triage as visit notes state \"Gouty foot- metatarsal, cataracts \"    Pt appt was at 2pm, I have canceled that appt. No other providers have opening as they are virtual. Unsure that virtual visit would be appropriate.     Have not contacted pt yet as RN triage is needed    Did grab pt the 3:30 (arrive at 3:10 ) spot for Irineo tomorrow if his symptoms are appropriate to wait   "

## 2024-03-08 NOTE — TELEPHONE ENCOUNTER
Patient viewed MyChart and reports he will call triage nurse line. See "[employer] [place]" 3/7/2024.    LOBITO LugoN, RN  [facility] ~ Registered Nurse  Clinic Triage ~ [place]  March 8, 2024

## 2024-03-14 ENCOUNTER — OFFICE VISIT (OUTPATIENT)
Dept: FAMILY MEDICINE | Facility: CLINIC | Age: 69
End: 2024-03-14
Payer: COMMERCIAL

## 2024-03-14 VITALS
OXYGEN SATURATION: 95 % | HEART RATE: 79 BPM | SYSTOLIC BLOOD PRESSURE: 110 MMHG | HEIGHT: 70 IN | RESPIRATION RATE: 18 BRPM | BODY MASS INDEX: 32.21 KG/M2 | DIASTOLIC BLOOD PRESSURE: 66 MMHG | TEMPERATURE: 97.8 F | WEIGHT: 225 LBS

## 2024-03-14 DIAGNOSIS — H25.9 SENILE CATARACT OF LEFT EYE, UNSPECIFIED AGE-RELATED CATARACT TYPE: ICD-10-CM

## 2024-03-14 DIAGNOSIS — M25.561 ACUTE PAIN OF RIGHT KNEE: ICD-10-CM

## 2024-03-14 DIAGNOSIS — M79.671 RIGHT FOOT PAIN: Primary | ICD-10-CM

## 2024-03-14 DIAGNOSIS — N18.31 CHRONIC KIDNEY DISEASE, STAGE 3A (H): ICD-10-CM

## 2024-03-14 PROCEDURE — 99213 OFFICE O/P EST LOW 20 MIN: CPT | Performed by: FAMILY MEDICINE

## 2024-03-14 ASSESSMENT — PAIN SCALES - GENERAL: PAINLEVEL: NO PAIN (0)

## 2024-03-14 NOTE — PATIENT INSTRUCTIONS
Important Takeaway Points From This Visit:     Call your insurance about where to get the RSV vaccine if interested.     Look into a cut out knee brace

## 2024-03-14 NOTE — PROGRESS NOTES
Assessment & Plan   1. Right foot pain  Not improving with metatarsalgia pads, but making them homemade. Consider marrufo's neuroma given tenderness between metatarsals. Referral to podiatry given.  - Orthopedic  Referral; Future    2. Senile cataract of left eye, unspecified age-related cataract type  Seen at [facility] optometry and told he has a cataract. 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 Would like to know next steps.  - Adult Eye  Referral; Future    3. Acute pain of right knee  Resolved today, likely patella femoral syndrome. Handouts given.    4. Chronic kidney disease, stage 3a (H)  Has followed-up with nephrology.        Taurus Lane MD  [facility]    Disclaimer: This note consists of symbols derived from keyboarding, dictation and/or voice recognition software. As a result, there may be errors in the script that have gone undetected. Please consider this when interpreting information found in this chart.    Doron Major is a 68 year old, presenting for the following health issues:  History of Present Illness        3/14/2024     3:46 PM   Additional Questions   Roomed by Tanika   Accompanied by Self         3/14/2024     3:46 PM   Patient Reported Additional Medications   Patient reports taking the following new medications none     History of Present Illness       Vascular Disease:  He presents for follow up of vascular disease.      He takes daily aspirin.    Reason for visit:  Advice on referrals    He eats 2-3 servings of fruits and vegetables daily.He consumes 1 sweetened beverage(s) daily.He exercises with enough effort to increase his heart rate 10 to 19 minutes per day.  He exercises with enough effort to increase his heart rate 3 or less days per week.   He is taking medications regularly.     MULTIPLE CONCERNS:    Referral to a Podiatrist, patient has noticed possible right foot metatarsal damage caused  "by gout he thinks. Homemade metatarsalgia pads hot helpful.  Check right knee, sharp ligament like pain when he leans down or bends over.  No apparent injury.  Cataracts concerns    Review of Systems  Constitutional, HEENT, cardiovascular, pulmonary, GI, , musculoskeletal, neuro, skin, endocrine and psych systems are negative, except as otherwise noted.      Objective    /66   Pulse 79   Temp 97.8  F (36.6  C) (Temporal)   Resp 18   Ht 1.785 m (5' 10.28\")   Wt 102.1 kg (225 lb)   SpO2 95%   BMI 32.03 kg/m    Body mass index is 32.03 kg/m .  Physical Exam  Vitals reviewed.   HENT:      Head: Normocephalic and atraumatic.   Eyes:      General:         Right eye: No discharge.         Left eye: No discharge.      Extraocular Movements: Extraocular movements intact.      Conjunctiva/sclera: Conjunctivae normal.      Pupils: Pupils are equal, round, and reactive to light.   Cardiovascular:      Rate and Rhythm: Normal rate and regular rhythm.      Heart sounds: Normal heart sounds. No murmur heard.     No friction rub.   Pulmonary:      Effort: Pulmonary effort is normal. No respiratory distress.      Breath sounds: Normal breath sounds. No wheezing or rhonchi.   Musculoskeletal:         General: No swelling.      Cervical back: Normal range of motion and neck supple. No tenderness.      Right knee: Normal.      Right foot: Tenderness present.        Feet:    Lymphadenopathy:      Cervical: No cervical adenopathy.   Skin:     General: Skin is warm.      Findings: No rash.   Neurological:      General: No focal deficit present.      Mental Status: He is alert.      Motor: No weakness.      Coordination: Coordination normal.      Gait: Gait normal.   Psychiatric:         Mood and Affect: Mood normal.         Behavior: Behavior normal.         Thought Content: Thought content normal.         Judgment: Judgment normal.          Labs: None          Signed Electronically by: Taurus Lane MD    "

## 2024-04-01 DIAGNOSIS — I10 ESSENTIAL HYPERTENSION: ICD-10-CM

## 2024-04-01 DIAGNOSIS — M10.9 GOUTY ARTHRITIS OF RIGHT GREAT TOE: ICD-10-CM

## 2024-04-01 DIAGNOSIS — N18.31 CHRONIC KIDNEY DISEASE, STAGE 3A (H): ICD-10-CM

## 2024-04-01 RX ORDER — LOSARTAN POTASSIUM 25 MG/1
25 TABLET ORAL DAILY
Qty: 90 TABLET | Refills: 1 | Status: SHIPPED | OUTPATIENT
Start: 2024-04-01 | End: 2024-10-02

## 2024-04-03 ENCOUNTER — MYC MEDICAL ADVICE (OUTPATIENT)
Dept: PODIATRY | Facility: CLINIC | Age: 69
End: 2024-04-03
Payer: COMMERCIAL

## 2024-04-17 ENCOUNTER — OFFICE VISIT (OUTPATIENT)
Dept: PODIATRY | Facility: CLINIC | Age: 69
End: 2024-04-17
Attending: FAMILY MEDICINE
Payer: COMMERCIAL

## 2024-04-17 VITALS
BODY MASS INDEX: 32.03 KG/M2 | HEART RATE: 74 BPM | DIASTOLIC BLOOD PRESSURE: 75 MMHG | WEIGHT: 225 LBS | SYSTOLIC BLOOD PRESSURE: 118 MMHG

## 2024-04-17 DIAGNOSIS — M77.8 CAPSULITIS OF FOOT, RIGHT: Primary | ICD-10-CM

## 2024-04-17 PROCEDURE — 99203 OFFICE O/P NEW LOW 30 MIN: CPT | Performed by: PODIATRIST

## 2024-04-17 NOTE — PATIENT INSTRUCTIONS
We wish you continued good healing. If you have any questions or concerns, please do not hesitate to contact us at  [phone]    [employer] (secure e-mail communication and access to your chart) to send a message or to make an appointment.    Please remember to call and schedule a follow up appointment if one was recommended at your earliest convenience.     PODIATRY CLINIC HOURS  TELEPHONE NUMBER    Dr. Domingo ALCALAPMARIBELL FACFAS        Clinics:  [facility]   [place]  Tuesday 1PM-6PM  [place]  Wednesday 745AM-330PM  [place]  Monday 2nd,4th  830AM-4PM  Thursday/Friday 745AM-230PM     [place] APPOINTMENTS  [phone]    [place] APPOINTMENTS  [phone]          If you need a medication refill, please contact us you may need lab work and/or a follow up visit prior to your refill (i.e. Antifungal medications).  If MRI needed please call Imaging at [phone]   HOW DO I GET MY KNEE SCOOTER? Knee scooters can be picked up at ANY Medical Supply stores with your knee scooter Prescription.  OR  Bring your signed prescription to an [facility] Medical Equipment showroom.   Set up an appointment for your custom Orthotics. Call any Orthotics locations call [phone]

## 2024-04-17 NOTE — LETTER
4/17/2024         RE: Pedrito Negron  [address]  [place] [address]        Dear Colleague,    Thank you for referring your patient, Pedrito Negron, to the [facility]. Please see a copy of my visit note below.     Subjective:    Pt is seen today in consult from Dr. Lane with the c/c of right foot pain.  Points to the subsecond metatarsal head.  Pain aggravated by activity and relieved by rest.  Has had this for several months.  He denies swelling.  Denies erythema or edema.  Denies weakness or numbness.  States his second toe is spreading over a little bit more.  Denies any trauma.  He does drive a bus.  He has history of gout.  He states this is under much better control now.  Patient has history of peripheral neuropathy and he sees neurology.  He has history of kidney injury.         ROS: See above     No Known Allergies    Current Outpatient Medications   Medication Sig Dispense Refill     allopurinol (ZYLOPRIM) 100 MG tablet Take 1 tablet (100 mg) by mouth daily 90 tablet 3     aspirin (ASA) 81 MG EC tablet Take 81 mg by mouth daily       atorvastatin (LIPITOR) 80 MG tablet TAKE 1 TABLET(80 MG) BY MOUTH DAILY 90 tablet 3     Cholecalciferol (VITAMIN D3 PO) Take by mouth daily       ezetimibe (ZETIA) 10 MG tablet TAKE 1 TABLET BY MOUTH EVERY DAY 90 tablet 3     isosorbide mononitrate (IMDUR) 30 MG 24 hr tablet Take 1 tablet (30 mg) by mouth daily 90 tablet 3     losartan (COZAAR) 25 MG tablet Take 1 tablet (25 mg) by mouth daily 90 tablet 1     metoprolol succinate ER (TOPROL XL) 50 MG 24 hr tablet Take 1 tablet (50 mg) by mouth daily 90 tablet 3     Multiple Vitamin (MULTI-VITAMINS) TABS Take 1 tablet by mouth daily Centrum Men's 50 +       nitroGLYcerin (NITROSTAT) 0.4 MG sublingual tablet ONE TABLET UNDER TONGUE AS NEEDED FOR CHEST PAIN- IF NO RELIEF AFTER 5 MINUTES CALL 911; USE 1 TABLET EVERY 5 MINUTES- MAX OF 3 TABLETS (Patient not taking: Reported on  3/14/2024) 25 tablet 1     nortriptyline (PAMELOR) 50 MG capsule Take 1 capsule (50 mg) by mouth at bedtime 90 capsule 1     omeprazole (PRILOSEC) 20 MG DR capsule Take 20 mg by mouth 2 times daily         Patient Active Problem List   Diagnosis     Coronary atherosclerosis     H/O acute myocardial infarction     H/O diverticulitis of colon     STEMI (ST elevation myocardial infarction) (H)     Hyperlipidemia LDL goal <100     Essential hypertension     Chronic left-sided low back pain without sciatica     Chronic kidney disease, stage 3a (H)       Past Medical History:   Diagnosis Date     Diverticulitis      Gout 2023     Hypertension        Past Surgical History:   Procedure Laterality Date     COLONOSCOPY WITH CO2 INSUFFLATION N/A 2020    Procedure: COLONOSCOPY, WITH CO2 INSUFFLATION;  Surgeon: Melinda Saleem MD;  Location: [facility] OR     large bowel resection      diverticulitis     STENT      cardiac       Family History   Problem Relation Age of Onset     Diabetes Sister        Social History     Tobacco Use     Smoking status: Former     Current packs/day: 0.00     Types: Cigarettes     Quit date:      Years since quittin.3     Passive exposure: Past     Smokeless tobacco: Never   Substance Use Topics     Alcohol use: Yes     Comment: 7 beers per week        Objective:    /75   Pulse 74   Wt 102.1 kg (225 lb)   BMI 32.03 kg/m  .      Constitutional/ general:  Pt is in no apparent distress, appears well-nourished.  Cooperative with history and physical exam.     Psych:  The patient answered questions appropriately.  Normal affect.  Seems to have reasonable expectations, in terms of treatment.     Eyes:  Visual scanning/ tracking without deficit.     Ears:  Response to auditory stimuli is normal.  No  hearing aid devices.  Auricles in proper alignment.     Lymphatic:  Popliteal lymph nodes not enlarged.     Lungs:  Non labored breathing, non labored speech. No cough.  No audible  wheezing. Even, quiet breathing.       Vascular:  Pedal pulses are palpable bilaterally.  CFT < 3 sec. some lower extremity edema noted.     Neuro:  Alert and oriented x 3. Coordinated gait.  Light touch sensation is diminished on toes.  Monofilament is intact.  Muscle strength equal and symmetrical bilaterally.    Derm: Somewhat thin and shiny with scant hair growth noted.    Musculoskeletal:    Patient is ambulatory without an assistive device or brace.   Normal arch with weightbearing.    MS 5/5 all compartments.    No equinus.   Normal ROM all forefoot and rearfoot joints.  Right bunion noted.  Bunion unremarkable at this time.  Pain under the right second metatarsal head.  No pain under the third metatarsal head.  No pain dorsal MTPJ's.  No pain with range of motion.  Negative Lachman's test.  Negative Jessie's click.  Fat pad atrophic.    Radiographic Exam:   Right foot x-rays show long second metatarsal.  No other obvious osseous abnormalities noted.             Component  Ref Range & Units 6 mo ago 7 mo ago 9 mo ago    Uric Acid  3.4 - 7.0 mg/dL 5.7 6.1 8.5 High               Assessment:    Right subsecond capsulitis  gout right foot    Plan:  X-rays from past personally reviewed.  Reviewed recent labs.  Discussed mechanics causing subsecond capsulitis.  Does not have good support for his foot around the house.  Recommend good house shoe at all times.  His current boots today are quite stiff.  Will make sure her outside shoes are stiff.  Dispensed a Budin splint to help offload this.  He has small metatarsal pad in his boot.  Ice for 20 minutes 3-4 times per day.  Avoid activities that bother this until healed and discussed what would bother this.  Doubtful this is gout.  RTC as needed.  Thank you for allowing me participate in the care of this patient.          Domingo Baker DPM, FACFAS         Again, thank you for allowing me to participate in the care of your patient.        Sincerely,        Domingo TERRAZAS  STEPHEN Baker

## 2024-04-17 NOTE — PROGRESS NOTES
Subjective:    Pt is seen today in consult from Dr. Lane with the c/c of right foot pain.  Points to the subsecond metatarsal head.  Pain aggravated by activity and relieved by rest.  Has had this for several months.  He denies swelling.  Denies erythema or edema.  Denies weakness or numbness.  States his second toe is spreading over a little bit more.  Denies any trauma.  He does drive a bus.  He has history of gout.  He states this is under much better control now.  Patient has history of peripheral neuropathy and he sees neurology.  He has history of kidney injury.         ROS: See above     No Known Allergies    Current Outpatient Medications   Medication Sig Dispense Refill    allopurinol (ZYLOPRIM) 100 MG tablet Take 1 tablet (100 mg) by mouth daily 90 tablet 3    aspirin (ASA) 81 MG EC tablet Take 81 mg by mouth daily      atorvastatin (LIPITOR) 80 MG tablet TAKE 1 TABLET(80 MG) BY MOUTH DAILY 90 tablet 3    Cholecalciferol (VITAMIN D3 PO) Take by mouth daily      ezetimibe (ZETIA) 10 MG tablet TAKE 1 TABLET BY MOUTH EVERY DAY 90 tablet 3    isosorbide mononitrate (IMDUR) 30 MG 24 hr tablet Take 1 tablet (30 mg) by mouth daily 90 tablet 3    losartan (COZAAR) 25 MG tablet Take 1 tablet (25 mg) by mouth daily 90 tablet 1    metoprolol succinate ER (TOPROL XL) 50 MG 24 hr tablet Take 1 tablet (50 mg) by mouth daily 90 tablet 3    Multiple Vitamin (MULTI-VITAMINS) TABS Take 1 tablet by mouth daily Centrum Men's 50 +      nitroGLYcerin (NITROSTAT) 0.4 MG sublingual tablet ONE TABLET UNDER TONGUE AS NEEDED FOR CHEST PAIN- IF NO RELIEF AFTER 5 MINUTES CALL 911; USE 1 TABLET EVERY 5 MINUTES- MAX OF 3 TABLETS (Patient not taking: Reported on 3/14/2024) 25 tablet 1    nortriptyline (PAMELOR) 50 MG capsule Take 1 capsule (50 mg) by mouth at bedtime 90 capsule 1    omeprazole (PRILOSEC) 20 MG DR capsule Take 20 mg by mouth 2 times daily         Patient Active Problem List   Diagnosis    Coronary atherosclerosis     H/O acute myocardial infarction    H/O diverticulitis of colon    STEMI (ST elevation myocardial infarction) (H)    Hyperlipidemia LDL goal <100    Essential hypertension    Chronic left-sided low back pain without sciatica    Chronic kidney disease, stage 3a (H)       Past Medical History:   Diagnosis Date    Diverticulitis     Gout 2023    Hypertension        Past Surgical History:   Procedure Laterality Date    COLONOSCOPY WITH CO2 INSUFFLATION N/A 2020    Procedure: COLONOSCOPY, WITH CO2 INSUFFLATION;  Surgeon: Melinda Saleem MD;  Location: [facility] OR    large bowel resection      diverticulitis    STENT      cardiac       Family History   Problem Relation Age of Onset    Diabetes Sister        Social History     Tobacco Use    Smoking status: Former     Current packs/day: 0.00     Types: Cigarettes     Quit date:      Years since quittin.3     Passive exposure: Past    Smokeless tobacco: Never   Substance Use Topics    Alcohol use: Yes     Comment: 7 beers per week        Objective:    /75   Pulse 74   Wt 102.1 kg (225 lb)   BMI 32.03 kg/m  .      Constitutional/ general:  Pt is in no apparent distress, appears well-nourished.  Cooperative with history and physical exam.     Psych:  The patient answered questions appropriately.  Normal affect.  Seems to have reasonable expectations, in terms of treatment.     Eyes:  Visual scanning/ tracking without deficit.     Ears:  Response to auditory stimuli is normal.  No  hearing aid devices.  Auricles in proper alignment.     Lymphatic:  Popliteal lymph nodes not enlarged.     Lungs:  Non labored breathing, non labored speech. No cough.  No audible wheezing. Even, quiet breathing.       Vascular:  Pedal pulses are palpable bilaterally.  CFT < 3 sec. some lower extremity edema noted.     Neuro:  Alert and oriented x 3. Coordinated gait.  Light touch sensation is diminished on toes.  Monofilament is intact.  Muscle strength equal and  symmetrical bilaterally.    Derm: Somewhat thin and shiny with scant hair growth noted.    Musculoskeletal:    Patient is ambulatory without an assistive device or brace.   Normal arch with weightbearing.    MS 5/5 all compartments.    No equinus.   Normal ROM all forefoot and rearfoot joints.  Right bunion noted.  Bunion unremarkable at this time.  Pain under the right second metatarsal head.  No pain under the third metatarsal head.  No pain dorsal MTPJ's.  No pain with range of motion.  Negative Lachman's test.  Negative Jessie's click.  Fat pad atrophic.    Radiographic Exam:   Right foot x-rays show long second metatarsal.  No other obvious osseous abnormalities noted.             Component  Ref Range & Units 6 mo ago 7 mo ago 9 mo ago    Uric Acid  3.4 - 7.0 mg/dL 5.7 6.1 8.5 High               Assessment:    Right subsecond capsulitis  gout right foot    Plan:  X-rays from past personally reviewed.  Reviewed recent labs.  Discussed mechanics causing subsecond capsulitis.  Does not have good support for his foot around the house.  Recommend good house shoe at all times.  His current boots today are quite stiff.  Will make sure her outside shoes are stiff.  Dispensed a Budin splint to help offload this.  He has small metatarsal pad in his boot.  Ice for 20 minutes 3-4 times per day.  Avoid activities that bother this until healed and discussed what would bother this.  Doubtful this is gout.  RTC as needed.  Thank you for allowing me participate in the care of this patient.          Domingo Baker, STEPHEN, FACFAS

## 2024-04-30 ENCOUNTER — OFFICE VISIT (OUTPATIENT)
Dept: OPHTHALMOLOGY | Facility: CLINIC | Age: 69
End: 2024-04-30
Payer: COMMERCIAL

## 2024-04-30 DIAGNOSIS — H52.13 MYOPIA OF BOTH EYES WITH ASTIGMATISM AND PRESBYOPIA: ICD-10-CM

## 2024-04-30 DIAGNOSIS — H52.203 MYOPIA OF BOTH EYES WITH ASTIGMATISM AND PRESBYOPIA: ICD-10-CM

## 2024-04-30 DIAGNOSIS — H52.4 MYOPIA OF BOTH EYES WITH ASTIGMATISM AND PRESBYOPIA: ICD-10-CM

## 2024-04-30 DIAGNOSIS — H25.813 COMBINED FORMS OF AGE-RELATED CATARACT OF BOTH EYES: ICD-10-CM

## 2024-04-30 DIAGNOSIS — H25.9 SENILE CATARACT OF LEFT EYE, UNSPECIFIED AGE-RELATED CATARACT TYPE: Primary | ICD-10-CM

## 2024-04-30 DIAGNOSIS — D31.31 CHOROIDAL NEVUS, RIGHT: ICD-10-CM

## 2024-04-30 PROCEDURE — 99204 OFFICE O/P NEW MOD 45 MIN: CPT | Performed by: OPHTHALMOLOGY

## 2024-04-30 PROCEDURE — 76519 ECHO EXAM OF EYE: CPT | Mod: 50 | Performed by: OPHTHALMOLOGY

## 2024-04-30 ASSESSMENT — CONF VISUAL FIELD
OD_SUPERIOR_NASAL_RESTRICTION: 0
OS_NORMAL: 1
OD_INFERIOR_TEMPORAL_RESTRICTION: 0
OS_SUPERIOR_NASAL_RESTRICTION: 0
OD_SUPERIOR_TEMPORAL_RESTRICTION: 0
OS_INFERIOR_NASAL_RESTRICTION: 0
OD_NORMAL: 1
OS_INFERIOR_TEMPORAL_RESTRICTION: 0
OD_INFERIOR_NASAL_RESTRICTION: 0
OS_SUPERIOR_TEMPORAL_RESTRICTION: 0

## 2024-04-30 ASSESSMENT — REFRACTION_MANIFEST
OD_CYLINDER: +2.00
OD_AXIS: 179
OS_CYLINDER: +2.00
OD_ADD: +2.50
OD_SPHERE: -5.50
OS_ADD: +2.50
OS_AXIS: 178
OS_SPHERE: -7.25

## 2024-04-30 ASSESSMENT — REFRACTION_WEARINGRX
OS_CYLINDER: +2.00
OD_AXIS: 005
SPECS_TYPE: PAL
OS_AXIS: 180
OS_ADD: +2.50
OD_SPHERE: -5.25
OS_SPHERE: -6.25
OD_ADD: +2.50
OD_CYLINDER: +2.00

## 2024-04-30 ASSESSMENT — VISUAL ACUITY
OS_CC: 20/60
CORRECTION_TYPE: GLASSES
OD_CC+: -1
OD_CC: 20/25
METHOD: SNELLEN - LINEAR
OD_BAT_HIGH: 20/40
OS_PH_CC: 20/30
OD_BAT_MED: 20/25-2

## 2024-04-30 ASSESSMENT — CUP TO DISC RATIO
OD_RATIO: 0.5
OS_RATIO: 0.5

## 2024-04-30 ASSESSMENT — SLIT LAMP EXAM - LIDS
COMMENTS: NORMAL
COMMENTS: NORMAL

## 2024-04-30 ASSESSMENT — TONOMETRY
OD_IOP_MMHG: 15
OS_IOP_MMHG: 16
IOP_METHOD: ICARE

## 2024-04-30 NOTE — PROGRESS NOTES
HPI       Cataract    In both eyes.  Duration of 1 year.             Comments    Pt. States that VA has been worsening BE over the last year. Started having issues with driving in the dark this last fall. No pain BE. No dryness BE. No flashes BE, some floaters BE.   Nelli Espinosa COT 2:18 PM April 30, 2024             Last edited by Nelli Espinosa on 4/30/2024  2:18 PM.         Review of systems for the eyes was negative other than the pertinent positives/negatives listed in the HPI.      Assessment & Plan    HPI:  Pedrito Negron is a 68 year old male with history of HLD, HTN, CAD, GERD, myopia with astigmatism and presbyopia presents for exam. He notes worsening vision both eyes over the last year left eye > right eye     POHx: myopia with astigmatism and presbyopia  PMHx: HLD, HTN, CAD, GERD  Current Medications:   Current Outpatient Medications   Medication Sig Dispense Refill    allopurinol (ZYLOPRIM) 100 MG tablet Take 1 tablet (100 mg) by mouth daily 90 tablet 3    aspirin (ASA) 81 MG EC tablet Take 81 mg by mouth daily      atorvastatin (LIPITOR) 80 MG tablet TAKE 1 TABLET(80 MG) BY MOUTH DAILY 90 tablet 3    Cholecalciferol (VITAMIN D3 PO) Take by mouth daily      ezetimibe (ZETIA) 10 MG tablet TAKE 1 TABLET BY MOUTH EVERY DAY 90 tablet 3    isosorbide mononitrate (IMDUR) 30 MG 24 hr tablet Take 1 tablet (30 mg) by mouth daily 90 tablet 3    losartan (COZAAR) 25 MG tablet Take 1 tablet (25 mg) by mouth daily 90 tablet 1    metoprolol succinate ER (TOPROL XL) 50 MG 24 hr tablet Take 1 tablet (50 mg) by mouth daily 90 tablet 3    Multiple Vitamin (MULTI-VITAMINS) TABS Take 1 tablet by mouth daily Centrum Men's 50 +      nitroGLYcerin (NITROSTAT) 0.4 MG sublingual tablet ONE TABLET UNDER TONGUE AS NEEDED FOR CHEST PAIN- IF NO RELIEF AFTER 5 MINUTES CALL 911; USE 1 TABLET EVERY 5 MINUTES- MAX OF 3 TABLETS (Patient not taking: Reported on 3/14/2024) 25 tablet 1    nortriptyline (PAMELOR) 50 MG capsule Take 1  capsule (50 mg) by mouth at bedtime 90 capsule 1    omeprazole (PRILOSEC) 20 MG DR capsule Take 20 mg by mouth 2 times daily       No current facility-administered medications for this visit.     FHx: mom-armd  PSHx: denies history of ocular surgeries       Current Eye Medications:      Assessment & Plan:  (H25.9) Senile cataract of left eye, unspecified age-related cataract type  (primary encounter diagnosis)  (H25.813) Combined forms of age-related cataract of both eyes  Special equipment/needs:  Eye: right  Anesthesia: topical  Dilates to: 6.5  Iris expansion:  Unlikely   Pseudoexfoliation: No  Trypan Blue: No  Trauma: No    Able lay to flat: Yes  Blood Thinner: Yes ASA81  Tamsulosin: Yes 2021 for 7 months  DM: No  Guttae: No    Dominant Eye: right    Plan: [place] right eye, -0.50 left eye, may consider toric    Cataract is believed to be significantly contributing to patient's visual impairment. Cataract surgery recommended and expected to improve vision. Vision is not correctable by glasses or other nonsurgical measures. R/B/A were discussed with the patient. These included, but are not limited to: bleeding, infection, loss of vision, loss of the eye, need for more surgery, glaucoma, retinal detachment, need for glasses, corneal edema. The patient voiced understanding and wishes to proceed. All lens options were discussed with the patient including monofocal lenses, multifocal lenses, and toric lenses. The risks and benefits of each were discussed, including halos, glare, and possible need for glasses. No guarantees about vision after surgery were given. The patient voiced understanding and wishes to proceed    Proceed with CE/IOL right eye followed by left eye  May consider tecnis toric eyehance.      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        Esteban Zamora MD     Attending Physician Attestation:  Complete documentation of historical and exam elements from today's encounter can be found in the full encounter summary report (not reduplicated in this progress note).  I personally obtained the chief complaint(s) and history of present illness.  I confirmed and edited as necessary the review of systems, past medical/surgical history, family history, social history, and examination findings as documented by others; and I examined the patient myself.  I personally reviewed the relevant tests, images, and reports as documented above.  I formulated and edited as necessary the assessment and plan and discussed the findings and management plan with the patient and family. - Esteban Zamora MD

## 2024-05-02 ENCOUNTER — TELEPHONE (OUTPATIENT)
Dept: OPHTHALMOLOGY | Facility: CLINIC | Age: 69
End: 2024-05-02
Payer: COMMERCIAL

## 2024-05-02 NOTE — TELEPHONE ENCOUNTER
Called and LVM for patient to schedule surgery with Dr. Zamora. Provided direct call back number [phone].      Pilar Fernandes on 5/2/2024 at 9:52 AM

## 2024-05-06 NOTE — TELEPHONE ENCOUNTER
Called and LVM for patient to schedule surgery with Dr. Zamora. Provided direct call back number [phone].      Pilar Fernandes on 5/6/2024 at 8:29 AM

## 2024-05-13 NOTE — TELEPHONE ENCOUNTER
[facility] to schedule surgery with  [facility] reached Letter sent next     Anna C. Schoenecker on 5/13/2024 at 11:07 AM

## 2024-05-13 NOTE — TELEPHONE ENCOUNTER
Patient called and LVM for surgery scheduling stating he works for [employer] and cannot  during the day. He is calling back to schedule surgery.      Pilar Fernandes on 5/13/2024 at 2:23 PM

## 2024-05-14 PROBLEM — H25.813 COMBINED FORMS OF AGE-RELATED CATARACT OF BOTH EYES: Status: ACTIVE | Noted: 2024-04-30

## 2024-05-14 NOTE — TELEPHONE ENCOUNTER
Called patient to schedule surgery with Dr Zamora    Spoke with: patient    Date of Surgery: 7/25 (Right) and 8/8 (Left)     Patient aware of approximate arrival time: Yes at morning     Location of surgery: [facility] (Right) and [facility] (Left)     Pre-Op H&P: [facility] at [facility]     Informed patient that they need to call to schedule pre-op H&P with [facility]  within 30 days of surgery date: Yes    Post-Op Appt Dates: 7/30, 8/13, 9/3     Discussed with patient pre-op RN will call 2-3 days prior to surgery with arrival time and instructions:  Yes    Discussed with patient PAC RN will provide arrival time and instructions for surgery at the time of the appointment: [[facility] only]: Not Applicable      Standard Surgery Packet Sent: Yes 05/14/24  via [employer]      Surgical Soap Discussed with Patient: No      Additional Information Sent in Packet: Post-op Appointment Itinerary      Informed patient that they will need an adult  to bring patient home from surgery: Yes  : Patient is aware of need for adult          Additional Comments:  Patient had questions regarding the different types of lenses. Message sent to Dr Zamora.      All patients questions were answered and was instructed to review surgical packet and call back [phone] with any questions or concerns.       Liz Tyson on 5/14/2024 at 2:03 PM

## 2024-05-24 NOTE — PROGRESS NOTES
Pedrito CREWS Carrillo is a 64 year old patient who comes in today for a Blood Pressure check.  Initial BP:  /84   Pulse 66      66  Disposition: follow-up as previously indicated by provider     Labored

## 2024-05-24 NOTE — TELEPHONE ENCOUNTER
Patient called and stated that he did not see his surgery for 8/8 and the [facility]. Confirmed with patient that he is still scheduled for surgery with Dr Zamora on 7/25 & 8/8. Mailed surgery date and post op appointments to patient on 5/24.  Liz Tyson on 5/24/2024 at 9:24 AM

## 2024-06-11 NOTE — PROGRESS NOTES
[facility]  Section of General Neurology  Return Patient Visit    Pedrito Negron MRN# [id number]   Age: 68 year old YOB: 1955            Assessment and Plan:   Pedrito Negron is a very pleasant 67 year old man who is seen in follow up for familial essential tremor,  probable mild length dependent neuropathy.  His low back symptoms remain improved.  His nerve pain improved previously with nortriptyline, this also facilitates sleep for him.  He tolerated but did not derive much benefit from gabapentin which we were trying to use to treat pain/tremor previously.  Tremor is similar but after a bout of gout his foot pain/nerve pain is worse.  We will try to treat this with Lyrica as below     Continue nortriptyline 50 mg for nerve pain,  Trial of lyrica to uptitrate to 50 mg in  mg at bedtime for worsening nerve pain as instructed, can increase in between visits as needed  Follow up in 6 months, sooner with any issues questions or changes    Bobby Daniels MD   of Neurology   [facility]      Interval history:     Lasik is his plan for eyes  Pain control overall is good.   Had some gout in foot, resolved but did cause some worsening pain.   Inversion table helps his back.    Tremor--similar, worse with movement  Previously Gabapentin 600-300-600 didn't help  Mom with similar tremor and brother Praneeth.   No big changes, will work to improve symptoms.      A/P at last visit  Pedrito Negron is a very pleasant 67 year old man who is seen in follow up for familial essential tremor,  probable mild length dependent neuropathy.  We also discussed his MRI L spine imaging as below.  His low back symptoms are improved.    His nerve pain improved greatly with nortriptyline, this also facilitates sleep for him.  He tolerated but did not derive much benefit from gabapentin which we were trying to use to treat pain/tremor.   He is on metoprolol for  cardiac/BP reasons and we discussed this may be helping his tremor as well.  To me he is doing quite well/quite stable overall neurologically.       Continue nortriptyline 50 mg for nerve pain,  OK to stay off of gabapentin, in future lyrica (pregabalin).   To discuss with cardiology team: if propranolol as needed for tough tremor that days (20 mg twice a day as needed) would be an OK option in addition to metoprolol  Follow up in 1 year, sooner with any issues questions or changes      Past Medical History:     Patient Active Problem List   Diagnosis    Coronary atherosclerosis    H/O acute myocardial infarction    H/O diverticulitis of colon    STEMI (ST elevation myocardial infarction) (H)    Hyperlipidemia LDL goal <100    Essential hypertension    Chronic left-sided low back pain without sciatica    Chronic kidney disease, stage 3a (H)    Combined forms of age-related cataract of both eyes     Past Medical History:   Diagnosis Date    Diverticulitis     Gout 2023    Hypertension         Past Surgical History:     Past Surgical History:   Procedure Laterality Date    COLONOSCOPY WITH CO2 INSUFFLATION N/A 2020    Procedure: COLONOSCOPY, WITH CO2 INSUFFLATION;  Surgeon: Melinda Saleem MD;  Location: [facility] OR    large bowel resection      diverticulitis    STENT      cardiac        Social History:     Social History     Tobacco Use    Smoking status: Former     Current packs/day: 0.00     Types: Cigarettes     Quit date:      Years since quittin.4     Passive exposure: Past    Smokeless tobacco: Never   Vaping Use    Vaping status: Never Used   Substance Use Topics    Alcohol use: Yes     Comment: 7 beers per week     Drug use: Never        Family History:     Family History   Problem Relation Age of Onset    Diabetes Sister     Glaucoma No family hx of     Macular Degeneration No family hx of     Retinal detachment No family hx of         Medications:     Current Outpatient Medications  "  Medication Sig Dispense Refill    allopurinol (ZYLOPRIM) 100 MG tablet Take 1 tablet (100 mg) by mouth daily 90 tablet 3    aspirin (ASA) 81 MG EC tablet Take 81 mg by mouth daily      atorvastatin (LIPITOR) 80 MG tablet TAKE 1 TABLET(80 MG) BY MOUTH DAILY 90 tablet 3    Cholecalciferol (VITAMIN D3 PO) Take by mouth daily      ezetimibe (ZETIA) 10 MG tablet TAKE 1 TABLET BY MOUTH EVERY DAY 90 tablet 3    isosorbide mononitrate (IMDUR) 30 MG 24 hr tablet Take 1 tablet (30 mg) by mouth daily 90 tablet 3    losartan (COZAAR) 25 MG tablet Take 1 tablet (25 mg) by mouth daily 90 tablet 1    metoprolol succinate ER (TOPROL XL) 50 MG 24 hr tablet Take 1 tablet (50 mg) by mouth daily 90 tablet 3    Multiple Vitamin (MULTI-VITAMINS) TABS Take 1 tablet by mouth daily Centrum Men's 50 +      nitroGLYcerin (NITROSTAT) 0.4 MG sublingual tablet ONE TABLET UNDER TONGUE AS NEEDED FOR CHEST PAIN- IF NO RELIEF AFTER 5 MINUTES CALL 911; USE 1 TABLET EVERY 5 MINUTES- MAX OF 3 TABLETS (Patient not taking: Reported on 3/14/2024) 25 tablet 1    nortriptyline (PAMELOR) 50 MG capsule Take 1 capsule (50 mg) by mouth at bedtime 90 capsule 1    omeprazole (PRILOSEC) 20 MG DR capsule Take 20 mg by mouth 2 times daily       No current facility-administered medications for this visit.        Allergies:   No Known Allergies       Physical Exam:   Vitals: /74 (BP Location: Right arm, Patient Position: Sitting, Cuff Size: Adult Regular)   Pulse 64   Ht 1.784 m (5' 10.25\")   Wt 102.1 kg (225 lb)   BMI 32.05 kg/m     Neuro:   General Appearance: No apparent distress, well-nourished, well-groomed, pleasant      Mental Status: Alert and oriented to person, place, and time. Speech fluent and comprehension intact. No dysarthria.     Cranial Nerves:   II: Visual fields: normal  III: Pupils: 3 mm, equal, round, reactive to light   III,IV,VI: Extraocular Movements: intact   V: Facial sensation: intact to light touch  VII: Facial strength: intact " without asymmetry  VIII: Hearing: intact grossly  IX: Palate: intact         Motor Exam:   5/5 diffusely     Fine b/l action tremor is present. Tone is normal throughout.     Sensory: intact to LT/PP/vibration today     Coordination: no dysmetria with finger-to-nose bilaterally     Reflexes: biceps, triceps, brachioradialis, patellar, and ankle jerks 1+ and symmetric.         Data: Pertinent prior to visit   Imaging--10/2022 MRI L spine     IMPRESSION:  Multilevel lumbar spondylosis and epidural lipomatosis, most  significantly resulting in severe concentric thecal sac stenosis with  at least moderate degree spinal canal narrowing at L2-3 and L3-4  resulting in clumping of cauda equina fibers. Additionally, there is   narrowing of the right lateral recess at L4-5 resulting in abutment of  the descending right L5 nerve root.        Lab:            Lab Results   Component Value Date     A1C 5.6 06/17/2022     A1C 5.1 02/19/2020      B12 WNL in 2021          The longitudinal plan of care for essential/familial tremor, nerve pain was addressed during this visit. Due to the added complexity in care, I will continue to support Mr. Negron in the subsequent management of this condition(s) and with the ongoing continuity of care of this condition(s).

## 2024-06-12 ENCOUNTER — OFFICE VISIT (OUTPATIENT)
Dept: NEUROLOGY | Facility: CLINIC | Age: 69
End: 2024-06-12
Payer: COMMERCIAL

## 2024-06-12 VITALS
WEIGHT: 225 LBS | DIASTOLIC BLOOD PRESSURE: 74 MMHG | SYSTOLIC BLOOD PRESSURE: 112 MMHG | HEIGHT: 70 IN | HEART RATE: 64 BPM | BODY MASS INDEX: 32.21 KG/M2

## 2024-06-12 DIAGNOSIS — G25.0 ESSENTIAL TREMOR: Primary | ICD-10-CM

## 2024-06-12 DIAGNOSIS — G62.9 LENGTH-DEPENDENT PERIPHERAL NEUROPATHY: ICD-10-CM

## 2024-06-12 DIAGNOSIS — M79.2 NERVE PAIN: ICD-10-CM

## 2024-06-12 PROCEDURE — 99214 OFFICE O/P EST MOD 30 MIN: CPT | Performed by: STUDENT IN AN ORGANIZED HEALTH CARE EDUCATION/TRAINING PROGRAM

## 2024-06-12 PROCEDURE — G2211 COMPLEX E/M VISIT ADD ON: HCPCS | Performed by: STUDENT IN AN ORGANIZED HEALTH CARE EDUCATION/TRAINING PROGRAM

## 2024-06-12 RX ORDER — PREGABALIN 50 MG/1
50 CAPSULE ORAL 3 TIMES DAILY
Qty: 90 CAPSULE | Refills: 5 | Status: SHIPPED | OUTPATIENT
Start: 2024-06-12

## 2024-06-12 RX ORDER — NORTRIPTYLINE HYDROCHLORIDE 50 MG/1
50 CAPSULE ORAL AT BEDTIME
Qty: 90 CAPSULE | Refills: 3 | Status: SHIPPED | OUTPATIENT
Start: 2024-06-12

## 2024-06-12 NOTE — LETTER
6/12/2024      Pedrito Negron  [id number]  [place] [address]      Dear Colleague,    Thank you for referring your patient, Pedrito Negron, to the [facility] [place]. Please see a copy of my visit note below.    [facility]  Section of General Neurology  Return Patient Visit    Pedrito Negron MRN# [id number]   Age: 68 year old YOB: 1955            Assessment and Plan:   Pedrito Negron is a very pleasant 67 year old man who is seen in follow up for familial essential tremor,  probable mild length dependent neuropathy.  His low back symptoms remain improved.  His nerve pain improved previously with nortriptyline, this also facilitates sleep for him.  He tolerated but did not derive much benefit from gabapentin which we were trying to use to treat pain/tremor previously.  Tremor is similar but after a bout of gout his foot pain/nerve pain is worse.  We will try to treat this with Lyrica as below     Continue nortriptyline 50 mg for nerve pain,  Trial of lyrica to uptitrate to 50 mg in  mg at bedtime for worsening nerve pain as instructed, can increase in between visits as needed  Follow up in 6 months, sooner with any issues questions or changes    Bobby Daniels MD   of Neurology   [facility]      Interval history:     Lasik is his plan for eyes  Pain control overall is good.   Had some gout in foot, resolved but did cause some worsening pain.   Inversion table helps his back.    Tremor--similar, worse with movement  Previously Gabapentin 600-300-600 didn't help  Mom with similar tremor and brother Praneeth.   No big changes, will work to improve symptoms.      A/P at last visit  Pedrito Negron is a very pleasant 67 year old man who is seen in follow up for familial essential tremor,  probable mild length dependent neuropathy.  We also discussed his MRI L spine imaging as below.  His low back symptoms  are improved.    His nerve pain improved greatly with nortriptyline, this also facilitates sleep for him.  He tolerated but did not derive much benefit from gabapentin which we were trying to use to treat pain/tremor.   He is on metoprolol for cardiac/BP reasons and we discussed this may be helping his tremor as well.  To me he is doing quite well/quite stable overall neurologically.       Continue nortriptyline 50 mg for nerve pain,  OK to stay off of gabapentin, in future lyrica (pregabalin).   To discuss with cardiology team: if propranolol as needed for tough tremor that days (20 mg twice a day as needed) would be an OK option in addition to metoprolol  Follow up in 1 year, sooner with any issues questions or changes      Past Medical History:     Patient Active Problem List   Diagnosis     Coronary atherosclerosis     H/O acute myocardial infarction     H/O diverticulitis of colon     STEMI (ST elevation myocardial infarction) (H)     Hyperlipidemia LDL goal <100     Essential hypertension     Chronic left-sided low back pain without sciatica     Chronic kidney disease, stage 3a (H)     Combined forms of age-related cataract of both eyes     Past Medical History:   Diagnosis Date     Diverticulitis      Gout 2023     Hypertension         Past Surgical History:     Past Surgical History:   Procedure Laterality Date     COLONOSCOPY WITH CO2 INSUFFLATION N/A 2020    Procedure: COLONOSCOPY, WITH CO2 INSUFFLATION;  Surgeon: Melinda Saleem MD;  Location: [facility] OR     large bowel resection      diverticulitis     STENT      cardiac        Social History:     Social History     Tobacco Use     Smoking status: Former     Current packs/day: 0.00     Types: Cigarettes     Quit date:      Years since quittin.4     Passive exposure: Past     Smokeless tobacco: Never   Vaping Use     Vaping status: Never Used   Substance Use Topics     Alcohol use: Yes     Comment: 7 beers per week      Drug use: Never  "       Family History:     Family History   Problem Relation Age of Onset     Diabetes Sister      Glaucoma No family hx of      Macular Degeneration No family hx of      Retinal detachment No family hx of         Medications:     Current Outpatient Medications   Medication Sig Dispense Refill     allopurinol (ZYLOPRIM) 100 MG tablet Take 1 tablet (100 mg) by mouth daily 90 tablet 3     aspirin (ASA) 81 MG EC tablet Take 81 mg by mouth daily       atorvastatin (LIPITOR) 80 MG tablet TAKE 1 TABLET(80 MG) BY MOUTH DAILY 90 tablet 3     Cholecalciferol (VITAMIN D3 PO) Take by mouth daily       ezetimibe (ZETIA) 10 MG tablet TAKE 1 TABLET BY MOUTH EVERY DAY 90 tablet 3     isosorbide mononitrate (IMDUR) 30 MG 24 hr tablet Take 1 tablet (30 mg) by mouth daily 90 tablet 3     losartan (COZAAR) 25 MG tablet Take 1 tablet (25 mg) by mouth daily 90 tablet 1     metoprolol succinate ER (TOPROL XL) 50 MG 24 hr tablet Take 1 tablet (50 mg) by mouth daily 90 tablet 3     Multiple Vitamin (MULTI-VITAMINS) TABS Take 1 tablet by mouth daily Centrum Men's 50 +       nitroGLYcerin (NITROSTAT) 0.4 MG sublingual tablet ONE TABLET UNDER TONGUE AS NEEDED FOR CHEST PAIN- IF NO RELIEF AFTER 5 MINUTES CALL 911; USE 1 TABLET EVERY 5 MINUTES- MAX OF 3 TABLETS (Patient not taking: Reported on 3/14/2024) 25 tablet 1     nortriptyline (PAMELOR) 50 MG capsule Take 1 capsule (50 mg) by mouth at bedtime 90 capsule 1     omeprazole (PRILOSEC) 20 MG DR capsule Take 20 mg by mouth 2 times daily       No current facility-administered medications for this visit.        Allergies:   No Known Allergies       Physical Exam:   Vitals: /74 (BP Location: Right arm, Patient Position: Sitting, Cuff Size: Adult Regular)   Pulse 64   Ht 1.784 m (5' 10.25\")   Wt 102.1 kg (225 lb)   BMI 32.05 kg/m     Neuro:   General Appearance: No apparent distress, well-nourished, well-groomed, pleasant      Mental Status: Alert and oriented to person, place, and time. " Speech fluent and comprehension intact. No dysarthria.     Cranial Nerves:   II: Visual fields: normal  III: Pupils: 3 mm, equal, round, reactive to light   III,IV,VI: Extraocular Movements: intact   V: Facial sensation: intact to light touch  VII: Facial strength: intact without asymmetry  VIII: Hearing: intact grossly  IX: Palate: intact         Motor Exam:   5/5 diffusely     Fine b/l action tremor is present. Tone is normal throughout.     Sensory: intact to LT/PP/vibration today     Coordination: no dysmetria with finger-to-nose bilaterally     Reflexes: biceps, triceps, brachioradialis, patellar, and ankle jerks 1+ and symmetric.         Data: Pertinent prior to visit   Imaging--10/2022 MRI L spine     IMPRESSION:  Multilevel lumbar spondylosis and epidural lipomatosis, most  significantly resulting in severe concentric thecal sac stenosis with  at least moderate degree spinal canal narrowing at L2-3 and L3-4  resulting in clumping of cauda equina fibers. Additionally, there is   narrowing of the right lateral recess at L4-5 resulting in abutment of  the descending right L5 nerve root.        Lab:            Lab Results   Component Value Date     A1C 5.6 06/17/2022     A1C 5.1 02/19/2020      B12 WNL in 2021          The longitudinal plan of care for essential/familial tremor, nerve pain was addressed during this visit. Due to the added complexity in care, I will continue to support Mr. Negron in the subsequent management of this condition(s) and with the ongoing continuity of care of this condition(s).      Again, thank you for allowing me to participate in the care of your patient.        Sincerely,        Juan A Daniels MD

## 2024-06-12 NOTE — PATIENT INSTRUCTIONS
Start lyrica 50 mg nightly then x1 week go to twice a day then either three times a day, or 50 mg in the morning 100 mg a night  A common side effect of gabapentin is fatigue, which is why we advise patients to start taking the medication at nighttime. The side effect of fatigue should resolve with time.  Other possible side effects include swelling, and less likely diarrhea.  It is commonly a well tolerated medicine.      Message me we can go higher.      Continue nortriptyline 50 mg at night     Follow up in 6 months if stable can space out to yearly again

## 2024-06-12 NOTE — NURSING NOTE
"Pedrito Negron's goals for this visit include:   Chief Complaint   Patient presents with    RECHECK     Nerve pain essential tremor //       He requests these members of his care team be copied on today's visit information: yes    PCP: [address]    Referring Provider:  Referred Self, MD  No address on file    /74 (BP Location: Right arm, Patient Position: Sitting, Cuff Size: Adult Regular)   Pulse 64   Ht 1.784 m (5' 10.25\")   Wt 102.1 kg (225 lb)   BMI 32.05 kg/m      Do you need any medication refills at today's visit? Yes  SKYLER Hughes, SIDDHARTHA ([facility])      "

## 2024-07-10 ENCOUNTER — OFFICE VISIT (OUTPATIENT)
Dept: FAMILY MEDICINE | Facility: CLINIC | Age: 69
End: 2024-07-10
Payer: COMMERCIAL

## 2024-07-10 VITALS
HEART RATE: 64 BPM | TEMPERATURE: 98.1 F | DIASTOLIC BLOOD PRESSURE: 70 MMHG | HEIGHT: 70 IN | OXYGEN SATURATION: 96 % | BODY MASS INDEX: 32.43 KG/M2 | SYSTOLIC BLOOD PRESSURE: 100 MMHG | WEIGHT: 226.5 LBS | RESPIRATION RATE: 14 BRPM

## 2024-07-10 DIAGNOSIS — E78.5 HYPERLIPIDEMIA LDL GOAL <100: ICD-10-CM

## 2024-07-10 DIAGNOSIS — M54.50 CHRONIC LEFT-SIDED LOW BACK PAIN WITHOUT SCIATICA: ICD-10-CM

## 2024-07-10 DIAGNOSIS — H25.813 COMBINED FORMS OF AGE-RELATED CATARACT OF BOTH EYES: ICD-10-CM

## 2024-07-10 DIAGNOSIS — G89.29 CHRONIC LEFT-SIDED LOW BACK PAIN WITHOUT SCIATICA: ICD-10-CM

## 2024-07-10 DIAGNOSIS — M1A.00X0 CHRONIC GOUTY ARTHRITIS: ICD-10-CM

## 2024-07-10 DIAGNOSIS — N18.31 CHRONIC KIDNEY DISEASE, STAGE 3A (H): ICD-10-CM

## 2024-07-10 DIAGNOSIS — I10 ESSENTIAL HYPERTENSION: ICD-10-CM

## 2024-07-10 DIAGNOSIS — Z01.818 PREOP GENERAL PHYSICAL EXAM: Primary | ICD-10-CM

## 2024-07-10 DIAGNOSIS — I21.3 ST ELEVATION MYOCARDIAL INFARCTION (STEMI), UNSPECIFIED ARTERY (H): ICD-10-CM

## 2024-07-10 PROCEDURE — 99214 OFFICE O/P EST MOD 30 MIN: CPT | Performed by: FAMILY MEDICINE

## 2024-07-10 PROCEDURE — G2211 COMPLEX E/M VISIT ADD ON: HCPCS | Performed by: FAMILY MEDICINE

## 2024-07-10 NOTE — PATIENT INSTRUCTIONS
How to Take Your Medication Before Surgery  Preoperative Medication Instructions   Antiplatelet or Anticoagulation Medication Instructions   - Bleeding risk is low for this procedure (e.g. dental, skin, cataract).   - aspirin: Bleeding risk is low for this procedure and daily aspirin may be continued without modification.     Additional Medication Instructions   - ACE/ARB: Continue without modification (e.g., MAC anesthesia, neurosurgery, spine surgery, heart failure, or labile hypertension with risk of hypertension).   - Beta Blockers: Continue taking on the day of surgery.   - fenofibrate, niacin, non-statin lipid meds (Zetia): DO NOT TAKE on day of surgery.   - Statins: Continue taking on the day of surgery.    - Herbal medications and vitamins: DO NOT TAKE 14 days prior to surgery.   - pregabalin, gabapentin: Continue without modification.   - SSRIs, SNRIs, TCAs, Antipsychotics: Continue without modification.          Patient Education   Preparing for Your Surgery  Getting started  A nurse will call you to review your health history and instructions. They will give you an arrival time based on your scheduled surgery time. Please be ready to share:  Your doctor's clinic name and phone number  Your medical, surgical, and anesthesia history  A list of allergies and sensitivities  A list of medicines, including herbal treatments and over-the-counter drugs  Whether the patient has a legal guardian (ask how to send us the papers in advance)  Please tell us if you're pregnant--or if there's any chance you might be pregnant. Some surgeries may injure a fetus (unborn baby), so they require a pregnancy test. Surgeries that are safe for a fetus don't always need a test, and you can choose whether to have one.   If you have a child who's having surgery, please ask for a copy of Preparing for Your Child's Surgery.    Preparing for surgery  Within 10 to 30 days of surgery: Have a pre-op exam (sometimes called an H&P, or  History and Physical). This can be done at a clinic or pre-operative center.  If you're having a , you may not need this exam. Talk to your care team.  At your pre-op exam, talk to your care team about all medicines you take. If you need to stop any medicines before surgery, ask when to start taking them again.  We do this for your safety. Many medicines can make you bleed too much during surgery. Some change how well surgery (anesthesia) drugs work.  Call your insurance company to let them know you're having surgery. (If you don't have insurance, call [phone].)  Call your clinic if there's any change in your health. This includes signs of a cold or flu (sore throat, runny nose, cough, rash, fever). It also includes a scrape or scratch near the surgery site.  If you have questions on the day of surgery, call your hospital or surgery center.  Eating and drinking guidelines  For your safety: Unless your surgeon tells you otherwise, follow the guidelines below.  Eat and drink as usual until 8 hours before you arrive for surgery. After that, no food or milk.  Drink clear liquids until 2 hours before you arrive. These are liquids you can see through, like water, Gatorade, and Propel Water. They also include plain black coffee and tea (no cream or milk), candy, and breath mints. You can spit out gum when you arrive.  If you drink alcohol: Stop drinking it the night before surgery.  If your care team tells you to take medicine on the morning of surgery, it's okay to take it with a sip of water.  Preventing infection  Shower or bathe the night before and morning of your surgery. Follow the instructions your clinic gave you. (If no instructions, use regular soap.)  Don't shave or clip hair near your surgery site. We'll remove the hair if needed.  Don't smoke or vape the morning of surgery. You may chew nicotine gum up to 2 hours before surgery. A nicotine patch is okay.  Note: Some surgeries require you to  completely quit smoking and nicotine. Check with your surgeon.  Your care team will make every effort to keep you safe from infection. We will:  Clean our hands often with soap and water (or an alcohol-based hand rub).  Clean the skin at your surgery site with a special soap that kills germs.  Give you a special gown to keep you warm. (Cold raises the risk of infection.)  Wear special hair covers, masks, gowns and gloves during surgery.  Give antibiotic medicine, if prescribed. Not all surgeries need antibiotics.  What to bring on the day of surgery  Photo ID and insurance card  Copy of your health care directive, if you have one  Glasses and hearing aids (bring cases)  You can't wear contacts during surgery  Inhaler and eye drops, if you use them (tell us about these when you arrive)  CPAP machine or breathing device, if you use them  A few personal items, if spending the night  If you have . . .  A pacemaker, ICD (cardiac defibrillator) or other implant: Bring the ID card.  An implanted stimulator: Bring the remote control.  A legal guardian: Bring a copy of the certified (court-stamped) guardianship papers.  Please remove any jewelry, including body piercings. Leave jewelry and other valuables at home.  If you're going home the day of surgery  You must have a responsible adult drive you home. They should stay with you overnight as well.  If you don't have someone to stay with you, and you aren't safe to go home alone, we may keep you overnight. Insurance often won't pay for this.  After surgery  If it's hard to control your pain or you need more pain medicine, please call your surgeon's office.  Questions?   If you have any questions for your care team, list them here: _________________________________________________________________________________________________________________________________________________________________________ ____________________________________ ____________________________________  ____________________________________  For informational purposes only. Not to replace the advice of your health care provider. Copyright   2003, 2019 [facility]. All rights reserved. Clinically reviewed by Viji Fierro MD. SMARTworks 104818 - REV 12/22.

## 2024-07-10 NOTE — PROGRESS NOTES
Preoperative Evaluation  [facility] [place]  [address]  [place] [address]  Phone: [phone]  Fax: [phone]  Primary Provider: Taurus Lane MD  Pre-op Performing Provider: Taurus Lane MD  Jul 10, 2024         7/9/2024   Surgical Information   What procedure is being done? Cataract Removal   Facility or Hospital where procedure/surgery will be performed: [facility]   Who is doing the procedure / surgery? Dr. Esteban Zamora   Date of surgery / procedure: July 25, 2024 & August 08, 2024   Time of surgery / procedure: 1:30 PM & 1:30 PM   Where do you plan to recover after surgery? at home alone      Fax number for surgical facility: Note does not need to be faxed, will be available electronically in Epic.    Assessment & Plan     The proposed surgical procedure is considered LOW risk.    1. Preop general physical exam  Medically optimized for proposed procedure.  May proceed as planned.  Up-to-date on tetanus.  Chronic conditions controlled.  Able to tolerate 4 METS.   - Basic metabolic panel  (Ca, Cl, CO2, Creat, Gluc, K, Na, BUN); Future    2. Combined forms of age-related cataract of both eyes  Plan for procedure above.    3. ST elevation myocardial infarction (STEMI), unspecified artery (H)  Continue aspirin for low risk procedure.  - Basic metabolic panel  (Ca, Cl, CO2, Creat, Gluc, K, Na, BUN); Future    4. Essential hypertension  Continue BB and ARB  - Basic metabolic panel  (Ca, Cl, CO2, Creat, Gluc, K, Na, BUN); Future    5. Hyperlipidemia LDL goal <100  Continue statin, hold zetia.    6. Chronic kidney disease, stage 3a (H)  Check BmP  - Basic metabolic panel  (Ca, Cl, CO2, Creat, Gluc, K, Na, BUN); Future    7. Chronic left-sided low back pain without sciatica  Continue lyrica    8. Chronic gouty arthritis  Continue allopurinol.          - No identified additional risk factors other than previously addressed    Preoperative  Medication Instructions  Antiplatelet or Anticoagulation Medication Instructions   - Bleeding risk is low for this procedure (e.g. dental, skin, cataract).   - aspirin: Bleeding risk is low for this procedure and daily aspirin may be continued without modification.     Additional Medication Instructions   - ACE/ARB: Continue without modification (e.g., MAC anesthesia, neurosurgery, spine surgery, heart failure, or labile hypertension with risk of hypertension).   - Beta Blockers: Continue taking on the day of surgery.   - fenofibrate, niacin, non-statin lipid meds (Zetia): DO NOT TAKE on day of surgery.   - Statins: Continue taking on the day of surgery.    - Herbal medications and vitamins: DO NOT TAKE 14 days prior to surgery.   - pregabalin, gabapentin: Continue without modification.   - SSRIs, SNRIs, TCAs, Antipsychotics: Continue without modification.     Recommendation  Approval given to proceed with proposed procedure, without further diagnostic evaluation.    Taurus Lane MD  [facility]    Disclaimer: This note consists of symbols derived from keyboarding, dictation and/or voice recognition software. As a result, there may be errors in the script that have gone undetected. Please consider this when interpreting information found in this chart.    The longitudinal plan of care for the diagnosis(es)/condition(s) as documented were addressed during this visit. Due to the added complexity in care, I will continue to support Pedrito in the subsequent management and with ongoing continuity of care.    Doron Major is a 68 year old, presenting for the following:  Pre-Op Exam        HPI related to upcoming procedure: History of bilateral cataract, plan for procedures above.        7/9/2024   Pre-Op Questionnaire   Have you ever had a heart attack or stroke? (!) YES -   Known CAD with multiple PCIs (inferior STEMI on 12/17/2018 s/p DIMITRI to left circumflex/OM and DIMITRI*2 to the  proximal and distal RCA,  PCI to LAD in 2010 and another PCI in 2003), mildly reduced LV fxn (EF 51% on cardiac MRI 9/2020)   Have you ever had surgery on your heart or blood vessels, such as a stent placement, a coronary artery bypass, or surgery on an artery in your head, neck, heart, or legs? (!) YES - as above   Do you have chest pain with activity? No   Do you have a history of heart failure? YES - EF of 51%.   Do you currently have a cold, bronchitis or symptoms of other infection? No   Do you have a cough, shortness of breath, or wheezing? No   Do you or anyone in your family have previous history of blood clots? No   Do you or does anyone in your family have a serious bleeding problem such as prolonged bleeding following surgeries or cuts? No   Have you ever had problems with anemia or been told to take iron pills? No   Have you had any abnormal blood loss such as black, tarry or bloody stools? No   Have you ever had a blood transfusion? No   Are you willing to have a blood transfusion if it is medically needed before, during, or after your surgery? Yes   Have you or any of your relatives ever had problems with anesthesia? No   Do you have sleep apnea, excessive snoring or daytime drowsiness? No   Do you have any artifical heart valves or other implanted medical devices like a pacemaker, defibrillator, or continuous glucose monitor? No   Do you have artificial joints? No   Are you allergic to latex? No        Health Care Directive  Patient does not have a Health Care Directive or Living Will: Discussed advance care planning with patient; however, patient declined at this time.    Extended Emergency Contact Information  Primary Emergency Contact: Conner Negron  Address: [address] [place] [address] [address] [facility]  Home Phone: [phone]  Relation: Brother    Secondary Emergency Contact: Lily Negron  Address: [address] [place] [address] [place]  Home Phone:  [phone]  Relation: Sister      Preoperative Review of    reviewed - controlled substances reflected in medication list.    Status of Chronic Conditions:  See problem list for active medical problems.  Problems all longstanding and stable, except as noted/documented.  See ROS for pertinent symptoms related to these conditions.    Patient Active Problem List    Diagnosis Date Noted    Combined forms of age-related cataract of both eyes 04/30/2024     Priority: Medium    Chronic kidney disease, stage 3a (H) 08/21/2023     Priority: Medium    Chronic left-sided low back pain without sciatica 01/20/2022     Priority: Medium    Hyperlipidemia LDL goal <100 02/26/2020     Priority: Medium    Essential hypertension 02/26/2020     Priority: Medium    STEMI (ST elevation myocardial infarction) (H) 12/17/2018     Priority: Medium    Coronary atherosclerosis 07/18/2014     Priority: Medium     s/p stent x2  2004 and restent x 1 in 2010      H/O acute myocardial infarction 07/18/2014     Priority: Medium    H/O diverticulitis of colon 07/18/2014     Priority: Medium     s/p partial colectomy        Past Medical History:   Diagnosis Date    Diverticulitis     Gout 07/2023    Heart disease     Hypertension      Past Surgical History:   Procedure Laterality Date    ABDOMEN SURGERY  2001    CARDIAC SURGERY  2003    COLONOSCOPY  2020    COLONOSCOPY WITH CO2 INSUFFLATION N/A 09/14/2020    Procedure: COLONOSCOPY, WITH CO2 INSUFFLATION;  Surgeon: Melinda Saleem MD;  Location: [facility] OR    large bowel resection      diverticulitis    STENT  2018    cardiac     Current Outpatient Medications   Medication Sig Dispense Refill    allopurinol (ZYLOPRIM) 100 MG tablet Take 1 tablet (100 mg) by mouth daily 90 tablet 3    aspirin (ASA) 81 MG EC tablet Take 81 mg by mouth daily      atorvastatin (LIPITOR) 80 MG tablet TAKE 1 TABLET(80 MG) BY MOUTH DAILY 90 tablet 3    Cholecalciferol (VITAMIN D3 PO) Take by mouth daily      ezetimibe  (ZETIA) 10 MG tablet TAKE 1 TABLET BY MOUTH EVERY DAY 90 tablet 3    isosorbide mononitrate (IMDUR) 30 MG 24 hr tablet Take 1 tablet (30 mg) by mouth daily 90 tablet 3    losartan (COZAAR) 25 MG tablet Take 1 tablet (25 mg) by mouth daily 90 tablet 1    metoprolol succinate ER (TOPROL XL) 50 MG 24 hr tablet Take 1 tablet (50 mg) by mouth daily 90 tablet 3    Multiple Vitamin (MULTI-VITAMINS) TABS Take 1 tablet by mouth daily Centrum Men's 50 +      nitroGLYcerin (NITROSTAT) 0.4 MG sublingual tablet ONE TABLET UNDER TONGUE AS NEEDED FOR CHEST PAIN- IF NO RELIEF AFTER 5 MINUTES CALL 911; USE 1 TABLET EVERY 5 MINUTES- MAX OF 3 TABLETS 25 tablet 1    nortriptyline (PAMELOR) 50 MG capsule Take 1 capsule (50 mg) by mouth at bedtime 90 capsule 3    omeprazole (PRILOSEC) 20 MG DR capsule Take 20 mg by mouth 2 times daily      pregabalin (LYRICA) 50 MG capsule Take 1 capsule (50 mg) by mouth 3 times daily 90 capsule 5       No Known Allergies     Social History     Tobacco Use    Smoking status: Former     Current packs/day: 0.00     Average packs/day: 1 pack/day for 30.0 years (30.0 ttl pk-yrs)     Types: Cigarettes     Start date: 1972     Quit date: 2005     Years since quittin.5     Passive exposure: Past    Smokeless tobacco: Never   Substance Use Topics    Alcohol use: Yes     Comment: 7 beers per week      Family History   Problem Relation Age of Onset    Diabetes Sister     Cerebrovascular Disease Mother     Other Cancer Father     Diabetes Sister     Hypertension Sister     Hyperlipidemia Sister     Obesity Sister     Hyperlipidemia Brother     Cerebrovascular Disease Brother     Glaucoma No family hx of     Macular Degeneration No family hx of     Retinal detachment No family hx of      History   Drug Use Unknown             Review of Systems  Constitutional, HEENT, cardiovascular, pulmonary, GI, , musculoskeletal, neuro, skin, endocrine and psych systems are negative, except as otherwise  "noted.    Objective    /70   Pulse 64   Temp 98.1  F (36.7  C) (Temporal)   Resp 14   Ht 1.784 m (5' 10.24\")   Wt 102.7 kg (226 lb 8 oz)   SpO2 96%   BMI 32.28 kg/m     Estimated body mass index is 32.28 kg/m  as calculated from the following:    Height as of this encounter: 1.784 m (5' 10.24\").    Weight as of this encounter: 102.7 kg (226 lb 8 oz).  Physical Exam  HENT:      Head: Normocephalic and atraumatic.      Right Ear: Tympanic membrane, ear canal and external ear normal. There is no impacted cerumen.      Left Ear: Tympanic membrane, ear canal and external ear normal. There is no impacted cerumen.      Nose: Nose normal. No congestion.      Mouth/Throat:      Pharynx: Oropharynx is clear. No oropharyngeal exudate or posterior oropharyngeal erythema.   Eyes:      General:         Right eye: No discharge.         Left eye: No discharge.      Extraocular Movements: Extraocular movements intact.      Conjunctiva/sclera: Conjunctivae normal.      Pupils: Pupils are equal, round, and reactive to light.   Cardiovascular:      Rate and Rhythm: Normal rate and regular rhythm.      Heart sounds: Normal heart sounds. No murmur heard.     No friction rub.   Pulmonary:      Effort: Pulmonary effort is normal. No respiratory distress.      Breath sounds: Normal breath sounds. No wheezing or rhonchi.   Abdominal:      General: Abdomen is flat. There is no distension.      Palpations: Abdomen is soft. There is no mass.      Tenderness: There is no abdominal tenderness. There is no guarding.   Musculoskeletal:         General: No swelling.      Cervical back: Normal range of motion and neck supple. No tenderness.      Right lower leg: No edema.      Left lower leg: No edema.   Lymphadenopathy:      Cervical: No cervical adenopathy.   Skin:     General: Skin is warm.      Findings: No rash.   Neurological:      General: No focal deficit present.      Mental Status: He is alert.      Cranial Nerves: No cranial " nerve deficit.      Motor: No weakness.      Coordination: Coordination normal.      Gait: Gait normal.   Psychiatric:         Mood and Affect: Mood normal.         Behavior: Behavior normal.         Thought Content: Thought content normal.         Judgment: Judgment normal.         Recent Labs   Lab Test 03/04/24  1628 11/14/23  1616   HGB  --  13.8   PLT  --  277    139   POTASSIUM 4.3 4.3   CR 1.24* 1.22*        Diagnostics  Labs pending at this time.  Results will be reviewed when available.   No EKG required for low risk surgery (cataract, skin procedure, breast biopsy, etc).    Revised Cardiac Risk Index (RCRI)  The patient has the following serious cardiovascular risks for perioperative complications:   - Coronary Artery Disease (MI, positive stress test, angina, Qs on EKG) = 1 point     RCRI Interpretation: 1 point: Class II (low risk - 0.9% complication rate)         Signed Electronically by: Taurus Lane MD  Copy of this evaluation report is provided to requesting physician.

## 2024-07-16 ENCOUNTER — LAB (OUTPATIENT)
Dept: LAB | Facility: CLINIC | Age: 69
End: 2024-07-16
Payer: COMMERCIAL

## 2024-07-16 DIAGNOSIS — I10 ESSENTIAL HYPERTENSION: ICD-10-CM

## 2024-07-16 DIAGNOSIS — I21.3 ST ELEVATION MYOCARDIAL INFARCTION (STEMI), UNSPECIFIED ARTERY (H): ICD-10-CM

## 2024-07-16 DIAGNOSIS — Z01.818 PREOP GENERAL PHYSICAL EXAM: ICD-10-CM

## 2024-07-16 DIAGNOSIS — N18.31 CHRONIC KIDNEY DISEASE, STAGE 3A (H): ICD-10-CM

## 2024-07-16 LAB
ANION GAP SERPL CALCULATED.3IONS-SCNC: 9 MMOL/L (ref 7–15)
BUN SERPL-MCNC: 19.2 MG/DL (ref 8–23)
CALCIUM SERPL-MCNC: 9.6 MG/DL (ref 8.8–10.4)
CHLORIDE SERPL-SCNC: 105 MMOL/L (ref 98–107)
CREAT SERPL-MCNC: 1.26 MG/DL (ref 0.67–1.17)
EGFRCR SERPLBLD CKD-EPI 2021: 62 ML/MIN/1.73M2
GLUCOSE SERPL-MCNC: 85 MG/DL (ref 70–99)
HCO3 SERPL-SCNC: 27 MMOL/L (ref 22–29)
POTASSIUM SERPL-SCNC: 4.7 MMOL/L (ref 3.4–5.3)
SODIUM SERPL-SCNC: 141 MMOL/L (ref 135–145)

## 2024-07-16 PROCEDURE — 36415 COLL VENOUS BLD VENIPUNCTURE: CPT

## 2024-07-16 PROCEDURE — 80048 BASIC METABOLIC PNL TOTAL CA: CPT

## 2024-07-17 DIAGNOSIS — H25.813 COMBINED FORMS OF AGE-RELATED CATARACT OF BOTH EYES: Primary | ICD-10-CM

## 2024-07-17 RX ORDER — KETOROLAC TROMETHAMINE 5 MG/ML
SOLUTION OPHTHALMIC
Qty: 5 ML | Refills: 0 | Status: SHIPPED | OUTPATIENT
Start: 2024-07-23 | End: 2024-07-30

## 2024-07-17 RX ORDER — MOXIFLOXACIN 5 MG/ML
1 SOLUTION/ DROPS OPHTHALMIC 4 TIMES DAILY
Qty: 3 ML | Refills: 0 | Status: SHIPPED | OUTPATIENT
Start: 2024-07-17 | End: 2024-07-30

## 2024-07-17 RX ORDER — PREDNISOLONE ACETATE 10 MG/ML
SUSPENSION/ DROPS OPHTHALMIC
Qty: 5 ML | Refills: 0 | Status: SHIPPED | OUTPATIENT
Start: 2024-07-25 | End: 2024-07-30

## 2024-07-17 NOTE — RESULT ENCOUNTER NOTE
Rakan Major,    If you have not viewed these results on [employer] within 3 days, we will use an alternative method to contact you. We will contact you via the following protocol:    - Via letter if your results are normal.  - Via phone ([phone]) if your results are abnormal.     Here are my comments about your recent results:    BMP - Your blood sugar was normal. Your kidney function and electrolytes were normal/stable for you.    Please call the clinic ([phone]), or message us on [employer] with any questions you may have.     Have a great day,    Dr. Cabello

## 2024-07-21 DIAGNOSIS — E78.5 HYPERLIPIDEMIA LDL GOAL <100: ICD-10-CM

## 2024-07-21 DIAGNOSIS — I25.10 CORONARY ATHEROSCLEROSIS: ICD-10-CM

## 2024-07-24 ENCOUNTER — ANESTHESIA EVENT (OUTPATIENT)
Dept: SURGERY | Facility: AMBULATORY SURGERY CENTER | Age: 69
End: 2024-07-24
Payer: COMMERCIAL

## 2024-07-25 ENCOUNTER — ANESTHESIA (OUTPATIENT)
Dept: SURGERY | Facility: AMBULATORY SURGERY CENTER | Age: 69
End: 2024-07-25
Payer: COMMERCIAL

## 2024-07-25 ENCOUNTER — HOSPITAL ENCOUNTER (OUTPATIENT)
Facility: AMBULATORY SURGERY CENTER | Age: 69
Discharge: HOME | End: 2024-07-25
Attending: OPHTHALMOLOGY | Admitting: OPHTHALMOLOGY
Payer: COMMERCIAL

## 2024-07-25 ENCOUNTER — OFFICE VISIT (OUTPATIENT)
Dept: OPHTHALMOLOGY | Facility: CLINIC | Age: 69
End: 2024-07-25
Payer: COMMERCIAL

## 2024-07-25 VITALS
DIASTOLIC BLOOD PRESSURE: 74 MMHG | WEIGHT: 225 LBS | HEIGHT: 70 IN | BODY MASS INDEX: 32.21 KG/M2 | RESPIRATION RATE: 16 BRPM | TEMPERATURE: 97 F | OXYGEN SATURATION: 98 % | SYSTOLIC BLOOD PRESSURE: 130 MMHG | HEART RATE: 57 BPM

## 2024-07-25 DIAGNOSIS — Z96.1 PSEUDOPHAKIA, RIGHT EYE: Primary | ICD-10-CM

## 2024-07-25 DIAGNOSIS — H25.811 COMBINED FORM OF AGE-RELATED CATARACT, RIGHT EYE: Primary | ICD-10-CM

## 2024-07-25 PROCEDURE — G8907 PT DOC NO EVENTS ON DISCHARG: HCPCS

## 2024-07-25 PROCEDURE — 99024 POSTOP FOLLOW-UP VISIT: CPT | Performed by: OPHTHALMOLOGY

## 2024-07-25 PROCEDURE — G8918 PT W/O PREOP ORDER IV AB PRO: HCPCS

## 2024-07-25 PROCEDURE — 66984 XCAPSL CTRC RMVL W/O ECP: CPT | Mod: RT

## 2024-07-25 PROCEDURE — V2599 CONTACT LENS/ES OTHER TYPE: HCPCS

## 2024-07-25 DEVICE — TEC EYHANCE TOR II SMPLCTY 14.5D CYL2.25
Type: IMPLANTABLE DEVICE | Site: EYE | Status: FUNCTIONAL
Brand: TECNIS IOL

## 2024-07-25 RX ORDER — LABETALOL HYDROCHLORIDE 5 MG/ML
10 INJECTION, SOLUTION INTRAVENOUS
Status: DISCONTINUED | OUTPATIENT
Start: 2024-07-25 | End: 2024-07-26 | Stop reason: HOSPADM

## 2024-07-25 RX ORDER — ALBUTEROL SULFATE 0.83 MG/ML
2.5 SOLUTION RESPIRATORY (INHALATION) EVERY 4 HOURS PRN
Status: DISCONTINUED | OUTPATIENT
Start: 2024-07-25 | End: 2024-07-26 | Stop reason: HOSPADM

## 2024-07-25 RX ORDER — DEXAMETHASONE SODIUM PHOSPHATE 4 MG/ML
4 INJECTION, SOLUTION INTRA-ARTICULAR; INTRALESIONAL; INTRAMUSCULAR; INTRAVENOUS; SOFT TISSUE
Status: DISCONTINUED | OUTPATIENT
Start: 2024-07-25 | End: 2024-07-26 | Stop reason: HOSPADM

## 2024-07-25 RX ORDER — FENTANYL CITRATE 50 UG/ML
25 INJECTION, SOLUTION INTRAMUSCULAR; INTRAVENOUS EVERY 5 MIN PRN
Status: DISCONTINUED | OUTPATIENT
Start: 2024-07-25 | End: 2024-07-26 | Stop reason: HOSPADM

## 2024-07-25 RX ORDER — KETOROLAC TROMETHAMINE 30 MG/ML
15 INJECTION, SOLUTION INTRAMUSCULAR; INTRAVENOUS
Status: DISCONTINUED | OUTPATIENT
Start: 2024-07-25 | End: 2024-07-26 | Stop reason: HOSPADM

## 2024-07-25 RX ORDER — SODIUM CHLORIDE, SODIUM LACTATE, POTASSIUM CHLORIDE, CALCIUM CHLORIDE 600; 310; 30; 20 MG/100ML; MG/100ML; MG/100ML; MG/100ML
INJECTION, SOLUTION INTRAVENOUS CONTINUOUS
Status: DISCONTINUED | OUTPATIENT
Start: 2024-07-25 | End: 2024-07-26 | Stop reason: HOSPADM

## 2024-07-25 RX ORDER — TIMOLOL MALEATE 5 MG/ML
SOLUTION/ DROPS OPHTHALMIC PRN
Status: DISCONTINUED | OUTPATIENT
Start: 2024-07-25 | End: 2024-07-25 | Stop reason: HOSPADM

## 2024-07-25 RX ORDER — FENTANYL CITRATE 50 UG/ML
50 INJECTION, SOLUTION INTRAMUSCULAR; INTRAVENOUS EVERY 5 MIN PRN
Status: DISCONTINUED | OUTPATIENT
Start: 2024-07-25 | End: 2024-07-26 | Stop reason: HOSPADM

## 2024-07-25 RX ORDER — ONDANSETRON 4 MG/1
4 TABLET, ORALLY DISINTEGRATING ORAL EVERY 30 MIN PRN
Status: DISCONTINUED | OUTPATIENT
Start: 2024-07-25 | End: 2024-07-26 | Stop reason: HOSPADM

## 2024-07-25 RX ORDER — HYDRALAZINE HYDROCHLORIDE 20 MG/ML
2.5-5 INJECTION INTRAMUSCULAR; INTRAVENOUS EVERY 10 MIN PRN
Status: DISCONTINUED | OUTPATIENT
Start: 2024-07-25 | End: 2024-07-26 | Stop reason: HOSPADM

## 2024-07-25 RX ORDER — OXYCODONE HYDROCHLORIDE 5 MG/1
10 TABLET ORAL
Status: DISCONTINUED | OUTPATIENT
Start: 2024-07-25 | End: 2024-07-26 | Stop reason: HOSPADM

## 2024-07-25 RX ORDER — ONDANSETRON 2 MG/ML
4 INJECTION INTRAMUSCULAR; INTRAVENOUS EVERY 30 MIN PRN
Status: DISCONTINUED | OUTPATIENT
Start: 2024-07-25 | End: 2024-07-26 | Stop reason: HOSPADM

## 2024-07-25 RX ORDER — DICLOFENAC SODIUM 1 MG/ML
1 SOLUTION/ DROPS OPHTHALMIC
Status: COMPLETED | OUTPATIENT
Start: 2024-07-25 | End: 2024-07-25

## 2024-07-25 RX ORDER — CYCLOPENTOLAT/TROPIC/PHENYLEPH 1%-1%-2.5%
1 DROPS (EA) OPHTHALMIC (EYE)
Status: COMPLETED | OUTPATIENT
Start: 2024-07-25 | End: 2024-07-25

## 2024-07-25 RX ORDER — DIPHENHYDRAMINE HCL 12.5MG/5ML
12.5 LIQUID (ML) ORAL EVERY 6 HOURS PRN
Status: DISCONTINUED | OUTPATIENT
Start: 2024-07-25 | End: 2024-07-26 | Stop reason: HOSPADM

## 2024-07-25 RX ORDER — ACETAMINOPHEN 325 MG/1
975 TABLET ORAL ONCE
Status: DISCONTINUED | OUTPATIENT
Start: 2024-07-25 | End: 2024-07-26 | Stop reason: HOSPADM

## 2024-07-25 RX ORDER — NALOXONE HYDROCHLORIDE 0.4 MG/ML
0.1 INJECTION, SOLUTION INTRAMUSCULAR; INTRAVENOUS; SUBCUTANEOUS
Status: DISCONTINUED | OUTPATIENT
Start: 2024-07-25 | End: 2024-07-26 | Stop reason: HOSPADM

## 2024-07-25 RX ORDER — DIPHENHYDRAMINE HYDROCHLORIDE 50 MG/ML
12.5 INJECTION INTRAMUSCULAR; INTRAVENOUS EVERY 6 HOURS PRN
Status: DISCONTINUED | OUTPATIENT
Start: 2024-07-25 | End: 2024-07-26 | Stop reason: HOSPADM

## 2024-07-25 RX ORDER — LIDOCAINE 40 MG/G
CREAM TOPICAL
Status: DISCONTINUED | OUTPATIENT
Start: 2024-07-25 | End: 2024-07-26 | Stop reason: HOSPADM

## 2024-07-25 RX ORDER — EZETIMIBE 10 MG/1
10 TABLET ORAL DAILY
Qty: 90 TABLET | Refills: 0 | Status: SHIPPED | OUTPATIENT
Start: 2024-07-25 | End: 2024-08-03

## 2024-07-25 RX ORDER — PROPARACAINE HYDROCHLORIDE 5 MG/ML
1 SOLUTION/ DROPS OPHTHALMIC
Status: DISCONTINUED | OUTPATIENT
Start: 2024-07-25 | End: 2024-07-26 | Stop reason: HOSPADM

## 2024-07-25 RX ORDER — OXYCODONE HYDROCHLORIDE 5 MG/1
5 TABLET ORAL
Status: DISCONTINUED | OUTPATIENT
Start: 2024-07-25 | End: 2024-07-26 | Stop reason: HOSPADM

## 2024-07-25 RX ORDER — ACETAMINOPHEN 325 MG/1
975 TABLET ORAL ONCE
Status: COMPLETED | OUTPATIENT
Start: 2024-07-25 | End: 2024-07-25

## 2024-07-25 RX ORDER — TETRACAINE HYDROCHLORIDE 5 MG/ML
SOLUTION OPHTHALMIC PRN
Status: DISCONTINUED | OUTPATIENT
Start: 2024-07-25 | End: 2024-07-25 | Stop reason: HOSPADM

## 2024-07-25 RX ORDER — MOXIFLOXACIN IN NACL,ISO-OS/PF 0.3MG/0.3
SYRINGE (ML) INTRAOCULAR PRN
Status: DISCONTINUED | OUTPATIENT
Start: 2024-07-25 | End: 2024-07-25 | Stop reason: HOSPADM

## 2024-07-25 RX ORDER — BALANCED SALT SOLUTION 6.4; .75; .48; .3; 3.9; 1.7 MG/ML; MG/ML; MG/ML; MG/ML; MG/ML; MG/ML
SOLUTION OPHTHALMIC PRN
Status: DISCONTINUED | OUTPATIENT
Start: 2024-07-25 | End: 2024-07-25 | Stop reason: HOSPADM

## 2024-07-25 RX ORDER — MOXIFLOXACIN 5 MG/ML
1 SOLUTION/ DROPS OPHTHALMIC
Status: COMPLETED | OUTPATIENT
Start: 2024-07-25 | End: 2024-07-25

## 2024-07-25 RX ORDER — FENTANYL CITRATE 50 UG/ML
25 INJECTION, SOLUTION INTRAMUSCULAR; INTRAVENOUS
Status: DISCONTINUED | OUTPATIENT
Start: 2024-07-25 | End: 2024-07-26 | Stop reason: HOSPADM

## 2024-07-25 RX ORDER — MEPERIDINE HYDROCHLORIDE 25 MG/ML
12.5 INJECTION INTRAMUSCULAR; INTRAVENOUS; SUBCUTANEOUS EVERY 5 MIN PRN
Status: DISCONTINUED | OUTPATIENT
Start: 2024-07-25 | End: 2024-07-26 | Stop reason: HOSPADM

## 2024-07-25 RX ORDER — LORAZEPAM 2 MG/ML
.5-1 INJECTION INTRAMUSCULAR
Status: DISCONTINUED | OUTPATIENT
Start: 2024-07-25 | End: 2024-07-26 | Stop reason: HOSPADM

## 2024-07-25 RX ADMIN — ACETAMINOPHEN 975 MG: 325 TABLET ORAL at 07:35

## 2024-07-25 RX ADMIN — DICLOFENAC SODIUM 1 DROP: 1 SOLUTION/ DROPS OPHTHALMIC at 07:49

## 2024-07-25 RX ADMIN — PROPARACAINE HYDROCHLORIDE 1 DROP: 5 SOLUTION/ DROPS OPHTHALMIC at 07:49

## 2024-07-25 RX ADMIN — DICLOFENAC SODIUM 1 DROP: 1 SOLUTION/ DROPS OPHTHALMIC at 07:36

## 2024-07-25 RX ADMIN — PROPARACAINE HYDROCHLORIDE 1 DROP: 5 SOLUTION/ DROPS OPHTHALMIC at 07:36

## 2024-07-25 RX ADMIN — MOXIFLOXACIN 1 DROP: 5 SOLUTION/ DROPS OPHTHALMIC at 07:36

## 2024-07-25 RX ADMIN — DICLOFENAC SODIUM 1 DROP: 1 SOLUTION/ DROPS OPHTHALMIC at 07:44

## 2024-07-25 RX ADMIN — MOXIFLOXACIN 1 DROP: 5 SOLUTION/ DROPS OPHTHALMIC at 07:49

## 2024-07-25 RX ADMIN — MOXIFLOXACIN 1 DROP: 5 SOLUTION/ DROPS OPHTHALMIC at 07:44

## 2024-07-25 RX ADMIN — Medication 1 DROP: at 07:49

## 2024-07-25 RX ADMIN — PROPARACAINE HYDROCHLORIDE 1 DROP: 5 SOLUTION/ DROPS OPHTHALMIC at 07:44

## 2024-07-25 RX ADMIN — Medication 1 DROP: at 07:36

## 2024-07-25 RX ADMIN — Medication 1 DROP: at 07:44

## 2024-07-25 ASSESSMENT — TONOMETRY
OD_IOP_MMHG: 29
IOP_METHOD: TONOPEN

## 2024-07-25 ASSESSMENT — VISUAL ACUITY
OD_SC+: -2
OD_SC: 20/70
METHOD: SNELLEN - LINEAR

## 2024-07-25 NOTE — OP NOTE
PREOPERATIVE DIAGNOSIS: Visually significant cataract, Right eye   POSTOPERATIVE DIAGNOSIS: Same   PROCEDURES:   1. Cataract extraction with intraocular lens implant Right eye.  SURGEON: Esteban Zamora M.D.  INDICATIONS: The patient Hong Negron presented to the eye clinic with decreased vision secondary to cataract in the Right eye. The risks, benefits and alternatives to cataract extraction were discussed. The patient elected to proceed. All questions were answered to the patient's satisfaction.   DESCRIPTION OF PROCEDURE:   Prior to the procedure, appropriate cardiac and respiratory monitors were applied to the patient.  A drop of topical tetracaine was placed.  A hong at the 90-degree axis was placed using a gentian violet marker while the patient was seated upright and marked at 007 degrees using the Robomarker.  The patient was brought to the operating room where a surgical pause was carried out to identify with all members of the surgical team the correct surgical site.  The Right eye was prepped and draped in the usual sterile fashion. A lid speculum was placed, and the operating microscope was rotated into position. A paracentesis was created.  Through this limbal paracentesis, the anterior chamber was filled with preservative-free lidocaine followed by viscoelastic.  A temporal wound was created at the limbus using a 2.6 mm blade. A capsulorrhexis was initiated using a bent 25-gauge needle and was completed in continuous and circular fashion using the capsulorrhexis forceps. The lens nucleus was hydrodissected using balanced salt solution.  The lens nucleus was rotated and removed using phacoemulsification in a stop and chop technique.  Residual cortical material was removed using irrigation-aspiration.  The capsular bag was reinflated to its maximal extent with cohesive viscoelastic. A Arango ring was used to confirm the hong at 007 degrees. A 14.5 diopter [id number] was inserted into the capsular bag.   The lens power selected was reviewed using the intraocular lens power measurements that were obtained preoperatively to confirm that the correct lens was selected for the desired post-operative refractive state. The residual viscoelastic was removed in its entirety, the wounds were hydrated and found to be self-sealing.  Intracameral moxifloxacin was administered. Tactile pressure was confirmed to be in a normal range.  The lid speculum was removed and a patch and shield were applied.   The patient tolerated the procedure well, and there were no complications.    PLAN: The patient will be discharged to home and will follow up today  EBL:  None  Complications:  None  Implant Name Type Inv. Item Serial No.  Lot No. LRB No. Used Action   Eyehance Toric II IOL [id number]   [id number]   Right 1 Implanted

## 2024-07-25 NOTE — PROGRESS NOTES
Review of systems for the eyes was negative other than the pertinent positives/negatives listed in the HPI.      Assessment & Plan    HPI:  Pedrito Negron is a 68 year old male with history of HLD, HTN, CAD, GERD, myopia with astigmatism and presbyopia presents for Postoperative day 0 right eye     POHx: myopia with astigmatism and presbyopia  PMHx: HLD, HTN, CAD, GERD  Current Medications:   Current Outpatient Medications   Medication Sig Dispense Refill    allopurinol (ZYLOPRIM) 100 MG tablet Take 1 tablet (100 mg) by mouth daily 90 tablet 3    aspirin (ASA) 81 MG EC tablet Take 81 mg by mouth daily      atorvastatin (LIPITOR) 80 MG tablet TAKE 1 TABLET(80 MG) BY MOUTH DAILY 90 tablet 3    Cholecalciferol (VITAMIN D3 PO) Take by mouth daily      ezetimibe (ZETIA) 10 MG tablet TAKE 1 TABLET BY MOUTH EVERY DAY 90 tablet 3    isosorbide mononitrate (IMDUR) 30 MG 24 hr tablet Take 1 tablet (30 mg) by mouth daily 90 tablet 3    ketorolac (ACULAR) 0.5 % ophthalmic solution Place 1 drop into the right eye 4 times daily for 7 days, THEN 1 drop 3 times daily for 7 days, THEN 1 drop 2 times daily for 7 days, THEN 1 drop daily for 7 days. Start 2 days prior to surgery. 5 mL 0    losartan (COZAAR) 25 MG tablet Take 1 tablet (25 mg) by mouth daily 90 tablet 1    metoprolol succinate ER (TOPROL XL) 50 MG 24 hr tablet Take 1 tablet (50 mg) by mouth daily 90 tablet 3    Multiple Vitamin (MULTI-VITAMINS) TABS Take 1 tablet by mouth daily Centrum Men's 50 +      nitroGLYcerin (NITROSTAT) 0.4 MG sublingual tablet ONE TABLET UNDER TONGUE AS NEEDED FOR CHEST PAIN- IF NO RELIEF AFTER 5 MINUTES CALL 911; USE 1 TABLET EVERY 5 MINUTES- MAX OF 3 TABLETS 25 tablet 1    nortriptyline (PAMELOR) 50 MG capsule Take 1 capsule (50 mg) by mouth at bedtime 90 capsule 3    omeprazole (PRILOSEC) 20 MG DR capsule Take 20 mg by mouth 2 times daily      prednisoLONE acetate (PRED FORTE) 1 % ophthalmic suspension Place 1 drop into the right eye 4  times daily for 7 days, THEN 1 drop 3 times daily for 7 days, THEN 1 drop 2 times daily for 7 days, THEN 1 drop daily for 7 days. Start after surgery. 5 mL 0    pregabalin (LYRICA) 50 MG capsule Take 1 capsule (50 mg) by mouth 3 times daily 90 capsule 5     No current facility-administered medications for this visit.     Facility-Administered Medications Ordered in Other Visits   Medication Dose Route Frequency Provider Last Rate Last Admin    acetaminophen (TYLENOL) tablet 975 mg  975 mg Oral Once Margarito Parada MD        acetaminophen (TYLENOL) tablet 975 mg  975 mg Oral Once Margarito Parada MD        albuterol (PROVENTIL) neb solution 2.5 mg  2.5 mg Nebulization Q4H PRN Margarito Parada MD        dexAMETHasone (DECADRON) injection 4 mg  4 mg Intravenous Once PRN Margarito Parada MD        dexAMETHasone (DECADRON) injection 4 mg  4 mg Intravenous Once PRN Margarito Parada MD        diphenhydrAMINE (BENADRYL) elixir 12.5 mg  12.5 mg Oral Q6H PRN Margarito Parada MD        Or    diphenhydrAMINE (BENADRYL) injection 12.5 mg  12.5 mg Intravenous Q6H PRN Margarito Parada MD        fentaNYL (PF) (SUBLIMAZE) injection 25 mcg  25 mcg Intravenous Q5 Min PRN Margarito Parada MD        fentaNYL (PF) (SUBLIMAZE) injection 25 mcg  25 mcg Intravenous Q15 Min PRN Margarito Parada MD        fentaNYL (PF) (SUBLIMAZE) injection 50 mcg  50 mcg Intravenous Q5 Min PRN Margarito Parada MD        hydrALAZINE (APRESOLINE) injection 2.5-5 mg  2.5-5 mg Intravenous Q10 Min PRN Margarito Parada MD        HYDROmorphone (DILAUDID) injection 0.2 mg  0.2 mg Intravenous Q5 Min PRN Margarito Parada MD        HYDROmorphone (DILAUDID) injection 0.4 mg  0.4 mg Intravenous Q5 Min PRN Margarito Parada MD        ketorolac (TORADOL) injection 15 mg  15 mg Intravenous Once PRN Margarito Parada MD        labetalol (NORMODYNE/TRANDATE) injection 10 mg  10 mg Intravenous Once PRN Margarito Parada MD        lactated ringers infusion   Intravenous Continuous Margarito Parada MD         lactated ringers infusion   Intravenous Continuous Margarito Parada MD        lactated ringers infusion   Intravenous Continuous Esteban Zamora MD        lidocaine (LMX4) kit   Topical Q1H PRN Margarito Parada MD        lidocaine 1 % 0.1-1 mL  0.1-1 mL Other Q1H PRN Margarito Parada MD        LORazepam (ATIVAN) injection 0.5-1 mg  0.5-1 mg Intravenous Once PRN Margarito Parada MD        meperidine (DEMEROL) injection 12.5 mg  12.5 mg Intravenous Q5 Min PRN Margarito Parada MD        naloxone (NARCAN) injection 0.1 mg  0.1 mg Intravenous Q2 Min PRN Margarito Parada MD        naloxone (NARCAN) injection 0.1 mg  0.1 mg Intravenous Q2 Min PRN Margarito Parada MD        ondansetron (ZOFRAN ODT) ODT tab 4 mg  4 mg Oral Q30 Min PRN Margarito Parada MD        Or    ondansetron (ZOFRAN) injection 4 mg  4 mg Intravenous Q30 Min PRN Margarito Parada MD        ondansetron (ZOFRAN ODT) ODT tab 4 mg  4 mg Oral Q30 Min PRN Margarito Parada MD        Or    ondansetron (ZOFRAN) injection 4 mg  4 mg Intravenous Q30 Min PRN Margarito Parada MD        oxyCODONE (ROXICODONE) tablet 10 mg  10 mg Oral Once PRN Margarito Parada MD        oxyCODONE (ROXICODONE) tablet 5 mg  5 mg Oral Once PRN Margarito Parada MD        prochlorperazine (COMPAZINE) injection 5 mg  5 mg Intravenous Q6H PRN Margarito Parada MD        prochlorperazine (COMPAZINE) injection 5 mg  5 mg Intravenous Q6H PRN Margarito Parada MD        proparacaine (ALCAINE) 0.5 % ophthalmic solution 1 drop  1 drop Ophthalmic q5 Min Prior to Surgery Esteban Zamora MD   1 drop at 07/25/24 0749    racEPINEPHrine neb solution 0.5 mL  0.5 mL Nebulization Q4H PRN Margarito Parada MD        sodium chloride (PF) 0.9% PF flush 3 mL  3 mL Intracatheter Q8H Margarito Parada MD        sodium chloride (PF) 0.9% PF flush 3 mL  3 mL Intracatheter q1 min prn Margarito Parada MD         FHx: mom-armd  PSHx: Cataract extraction/intraocular lens Zamora right eye 07/25/24       Current Eye  Medications:      Assessment & Plan:  Pseudophakia, right eye, day 0  Sera right eye 07/25/24   Doing well  Timolol and brimonidine adminstered in clinic for IOP 29  Keep patch in place at night for 7 days  Start post-operative drops and taper according to instructions  Post-operative do's and don'ts reviewed, questions answered    Recheck 1 week    (H25.9) Senile cataract of left eye, unspecified age-related cataract type  (primary encounter diagnosis)  (H25.813) Combined forms of age-related cataract of both eyes  Special equipment/needs:  Eye: right  Anesthesia: topical  Dilates to: 6.5  Iris expansion:  Unlikely   Pseudoexfoliation: No  Trypan Blue: No  Trauma: No    Able lay to flat: Yes  Blood Thinner: Yes ASA81  Tamsulosin: Yes 2021 for 7 months  DM: No  Guttae: No    Dominant Eye: right    Plan: [place] right eye, -0.50 left eye, may consider toric    Proceed with CE/IOL right eye followed by left eye  May consider tecnis toric eyehance.      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        Esteban Zamora MD     Attending Physician Attestation:  Complete documentation of historical and exam elements from today's encounter can be found in the full encounter summary report (not reduplicated in this progress note).  I personally obtained the chief complaint(s) and history of present illness.  I confirmed and edited as necessary the review of systems, past medical/surgical history, family history, social history, and examination findings as documented by others; and I examined the patient myself.  I personally reviewed the relevant tests, images, and reports as documented above.  I formulated and edited as necessary the assessment and plan  and discussed the findings and management plan with the patient and family. - Esteban Zamora MD

## 2024-07-25 NOTE — DISCHARGE INSTRUCTIONS
Tylenol 975 mg was given at 7:35 am.    You should not take more then 4,000 mg of tylenol/acetaminophen in a 24 hour period.

## 2024-07-26 NOTE — TELEPHONE ENCOUNTER
Last Clinic Visit:   8/4/2023  [facility]

## 2024-07-30 ENCOUNTER — OFFICE VISIT (OUTPATIENT)
Dept: OPHTHALMOLOGY | Facility: CLINIC | Age: 69
End: 2024-07-30
Payer: COMMERCIAL

## 2024-07-30 DIAGNOSIS — Z96.1 PSEUDOPHAKIA, RIGHT EYE: Primary | ICD-10-CM

## 2024-07-30 DIAGNOSIS — H25.813 COMBINED FORMS OF AGE-RELATED CATARACT OF BOTH EYES: ICD-10-CM

## 2024-07-30 PROCEDURE — 99024 POSTOP FOLLOW-UP VISIT: CPT | Performed by: OPHTHALMOLOGY

## 2024-07-30 RX ORDER — KETOROLAC TROMETHAMINE 5 MG/ML
SOLUTION OPHTHALMIC
Qty: 5 ML | Refills: 0 | Status: SHIPPED | OUTPATIENT
Start: 2024-08-06 | End: 2024-09-03

## 2024-07-30 RX ORDER — MOXIFLOXACIN 5 MG/ML
1 SOLUTION/ DROPS OPHTHALMIC 4 TIMES DAILY
Qty: 3 ML | Refills: 0 | Status: SHIPPED | OUTPATIENT
Start: 2024-08-08 | End: 2024-08-15

## 2024-07-30 RX ORDER — PREDNISOLONE ACETATE 10 MG/ML
SUSPENSION/ DROPS OPHTHALMIC
Qty: 5 ML | Refills: 0 | Status: SHIPPED | OUTPATIENT
Start: 2024-08-08 | End: 2024-09-05

## 2024-07-30 ASSESSMENT — REFRACTION_MANIFEST
METHOD_AUTOREFRACTION: 1
OD_SPHERE: -0.50
OD_AXIS: 004
OD_SPHERE: PLANO
OD_CYLINDER: +1.50
OD_AXIS: 010
OD_CYLINDER: +1.25

## 2024-07-30 ASSESSMENT — VISUAL ACUITY
METHOD: SNELLEN - LINEAR
OS_CC+: -2
METHOD_MR: DX PURPOSES
OS_CC: 20/30
OD_SC: 20/30
CORRECTION_TYPE: GLASSES

## 2024-07-30 ASSESSMENT — REFRACTION_WEARINGRX
OD_ADD: +2.50
OS_ADD: +2.50
OS_CYLINDER: +2.00
OS_AXIS: 180
SPECS_TYPE: PAL
OD_CYLINDER: +2.00
OD_AXIS: 005
OD_SPHERE: -5.25
OS_SPHERE: -6.25

## 2024-07-30 ASSESSMENT — SLIT LAMP EXAM - LIDS
COMMENTS: NORMAL
COMMENTS: NORMAL

## 2024-07-30 ASSESSMENT — TONOMETRY
IOP_METHOD: TONOPEN
OD_IOP_MMHG: 17
OS_IOP_MMHG: 18

## 2024-07-30 ASSESSMENT — REFRACTION
OD_CYLINDER: +1.00
OD_AXIS: 011
OD_SPHERE: PLANO

## 2024-07-30 NOTE — PROGRESS NOTES
HPI       Post Op (Ophthalmology) Right Eye    In right eye.  Since onset it is stable.  Associated symptoms include Negative for eye pain, flashes and floaters.  Treatments tried include eye drops.             Comments    Here for post op right eye. [facility] doing fine. No pain. Compliant with drops.    Nick MCKENNA 1:10 PM July 30, 2024             Last edited by Nick Amezcua on 7/30/2024  1:10 PM.           Review of systems for the eyes was negative other than the pertinent positives/negatives listed in the HPI.      Assessment & Plan    HPI:  Pedrito Negron is a 68 year old male with history of HLD, HTN, CAD, GERD, myopia with astigmatism and presbyopia presents for Postoperative week 1 right eye     POHx: myopia with astigmatism and presbyopia  PMHx: HLD, HTN, CAD, GERD  Current Medications:   Current Outpatient Medications   Medication Sig Dispense Refill    allopurinol (ZYLOPRIM) 100 MG tablet Take 1 tablet (100 mg) by mouth daily 90 tablet 3    aspirin (ASA) 81 MG EC tablet Take 81 mg by mouth daily      atorvastatin (LIPITOR) 80 MG tablet TAKE 1 TABLET(80 MG) BY MOUTH DAILY 90 tablet 3    Cholecalciferol (VITAMIN D3 PO) Take by mouth daily      ezetimibe (ZETIA) 10 MG tablet Take 1 tablet (10 mg) by mouth daily 90 tablet 0    isosorbide mononitrate (IMDUR) 30 MG 24 hr tablet Take 1 tablet (30 mg) by mouth daily 90 tablet 3    [START ON 8/6/2024] ketorolac (ACULAR) 0.5 % ophthalmic solution Place 1 drop Into the left eye 4 times daily for 7 days, THEN 1 drop 3 times daily for 7 days, THEN 1 drop 2 times daily for 7 days, THEN 1 drop daily for 7 days. Start 2 days prior to surgery. 5 mL 0    losartan (COZAAR) 25 MG tablet Take 1 tablet (25 mg) by mouth daily 90 tablet 1    metoprolol succinate ER (TOPROL XL) 50 MG 24 hr tablet Take 1 tablet (50 mg) by mouth daily 90 tablet 3    [START ON 8/8/2024] moxifloxacin (VIGAMOX) 0.5 % ophthalmic solution Place 1 drop Into the left eye 4 times daily for 7 days Start after  surgery. 3 mL 0    Multiple Vitamin (MULTI-VITAMINS) TABS Take 1 tablet by mouth daily Centrum Men's 50 +      nitroGLYcerin (NITROSTAT) 0.4 MG sublingual tablet ONE TABLET UNDER TONGUE AS NEEDED FOR CHEST PAIN- IF NO RELIEF AFTER 5 MINUTES CALL 911; USE 1 TABLET EVERY 5 MINUTES- MAX OF 3 TABLETS 25 tablet 1    nortriptyline (PAMELOR) 50 MG capsule Take 1 capsule (50 mg) by mouth at bedtime 90 capsule 3    omeprazole (PRILOSEC) 20 MG DR capsule Take 20 mg by mouth 2 times daily      [START ON 8/8/2024] prednisoLONE acetate (PRED FORTE) 1 % ophthalmic suspension Place 1 drop Into the left eye 4 times daily for 7 days, THEN 1 drop 3 times daily for 7 days, THEN 1 drop 2 times daily for 7 days, THEN 1 drop daily for 7 days. Start after surgery. 5 mL 0    pregabalin (LYRICA) 50 MG capsule Take 1 capsule (50 mg) by mouth 3 times daily 90 capsule 5     No current facility-administered medications for this visit.     FHx: mom-armd  PSHx: Cataract extraction/intraocular lens Zamora right eye 07/25/24       Current Eye Medications:      Assessment & Plan:  Pseudophakia, right eye  Zamora right eye 07/25/24   Unexpected cylinder today  Patient appears at proper alignment  at ~4 degrees with some anterior displacement of the inferior edge of the lens anterior to the rhexis.   Return in 1 week to recheck  May need lens exchange vs glasses vs  lasik    (H25.9) Senile cataract of left eye, unspecified age-related cataract type  (primary encounter diagnosis)  (H25.813) Combined forms of age-related cataract of both eyes  Special equipment/needs:  Eye: right  Anesthesia: topical  Dilates to: 6.5  Iris expansion:  Unlikely   Pseudoexfoliation: No  Trypan Blue: No  Trauma: No    Able lay to flat: Yes  Blood Thinner: Yes ASA81  Tamsulosin: Yes 2021 for 7 months  DM: No  Guttae: No    Dominant Eye: right    Plan: [place] right eye, -0.50 left eye, may consider toric    Proceed with CE/IOL right eye followed by left eye  May  consider tecnis toric eyehance.      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        Esteban Zamora MD     Attending Physician Attestation:  Complete documentation of historical and exam elements from today's encounter can be found in the full encounter summary report (not reduplicated in this progress note).  I personally obtained the chief complaint(s) and history of present illness.  I confirmed and edited as necessary the review of systems, past medical/surgical history, family history, social history, and examination findings as documented by others; and I examined the patient myself.  I personally reviewed the relevant tests, images, and reports as documented above.  I formulated and edited as necessary the assessment and plan and discussed the findings and management plan with the patient and family. - Esteban Zamora MD

## 2024-08-02 ENCOUNTER — ANCILLARY PROCEDURE (OUTPATIENT)
Dept: CARDIOLOGY | Facility: CLINIC | Age: 69
End: 2024-08-02
Attending: INTERNAL MEDICINE
Payer: COMMERCIAL

## 2024-08-02 DIAGNOSIS — I21.3 ST ELEVATION MYOCARDIAL INFARCTION (STEMI), UNSPECIFIED ARTERY (H): ICD-10-CM

## 2024-08-02 LAB — LVEF ECHO: NORMAL

## 2024-08-02 PROCEDURE — 93306 TTE W/DOPPLER COMPLETE: CPT | Performed by: INTERNAL MEDICINE

## 2024-08-02 RX ADMIN — Medication 5 ML: at 09:54

## 2024-08-03 ENCOUNTER — MYC REFILL (OUTPATIENT)
Dept: CARDIOLOGY | Facility: CLINIC | Age: 69
End: 2024-08-03
Payer: COMMERCIAL

## 2024-08-03 DIAGNOSIS — I25.10 CORONARY ATHEROSCLEROSIS: ICD-10-CM

## 2024-08-03 DIAGNOSIS — E78.5 HYPERLIPIDEMIA LDL GOAL <100: ICD-10-CM

## 2024-08-05 RX ORDER — EZETIMIBE 10 MG/1
10 TABLET ORAL DAILY
Qty: 90 TABLET | Refills: 0 | Status: SHIPPED | OUTPATIENT
Start: 2024-08-05 | End: 2024-09-19

## 2024-08-05 NOTE — TELEPHONE ENCOUNTER
Noted pt cancelled follow-up with Dr Agosto this week. Denise refill sent until pt rescheduled with new cardiologist. Pt notified via [employer]           Requested Prescriptions   Pending Prescriptions Disp Refills    ezetimibe (ZETIA) 10 MG tablet 90 tablet 0     Sig: Take 1 tablet (10 mg) by mouth daily       Antihyperlipidemic agents Failed - 8/3/2024  9:20 PM        Failed - Recent (12 mo) or future (90 days) visit within the authorizing provider's specialty     The patient must have completed an in-person or virtual visit within the past 12 months or has a future visit scheduled within the next 90 days with the authorizing provider s specialty.  Urgent care and e-visits do not quality as an office visit for this protocol.          Passed - LDL on file in the past 12 months        Passed - Medication is active on med list        Passed - Patient is age 18 years or older

## 2024-08-07 ENCOUNTER — ANESTHESIA EVENT (OUTPATIENT)
Dept: SURGERY | Facility: AMBULATORY SURGERY CENTER | Age: 69
End: 2024-08-07
Payer: COMMERCIAL

## 2024-08-08 ENCOUNTER — ANESTHESIA (OUTPATIENT)
Dept: SURGERY | Facility: AMBULATORY SURGERY CENTER | Age: 69
End: 2024-08-08
Payer: COMMERCIAL

## 2024-08-08 ENCOUNTER — HOSPITAL ENCOUNTER (OUTPATIENT)
Facility: AMBULATORY SURGERY CENTER | Age: 69
Discharge: HOME | End: 2024-08-08
Attending: OPHTHALMOLOGY
Payer: COMMERCIAL

## 2024-08-08 ENCOUNTER — OFFICE VISIT (OUTPATIENT)
Dept: OPHTHALMOLOGY | Facility: CLINIC | Age: 69
End: 2024-08-08
Payer: COMMERCIAL

## 2024-08-08 VITALS
HEART RATE: 60 BPM | OXYGEN SATURATION: 93 % | SYSTOLIC BLOOD PRESSURE: 162 MMHG | RESPIRATION RATE: 18 BRPM | WEIGHT: 224.87 LBS | TEMPERATURE: 97.6 F | BODY MASS INDEX: 32.27 KG/M2 | DIASTOLIC BLOOD PRESSURE: 91 MMHG

## 2024-08-08 DIAGNOSIS — H25.812 COMBINED FORM OF AGE-RELATED CATARACT, LEFT EYE: Primary | ICD-10-CM

## 2024-08-08 DIAGNOSIS — Z96.1 PSEUDOPHAKIA, LEFT EYE: Primary | ICD-10-CM

## 2024-08-08 PROCEDURE — 96999 UNLISTED SPEC DERM SVC/PX: CPT | Mod: LT | Performed by: OPHTHALMOLOGY

## 2024-08-08 PROCEDURE — 66984 XCAPSL CTRC RMVL W/O ECP: CPT | Mod: 79 | Performed by: OPHTHALMOLOGY

## 2024-08-08 PROCEDURE — 99024 POSTOP FOLLOW-UP VISIT: CPT | Performed by: OPHTHALMOLOGY

## 2024-08-08 DEVICE — TEC EYHANCE TOR II SMPLCTY 15.0D CYL2.25
Type: IMPLANTABLE DEVICE | Site: EYE | Status: FUNCTIONAL
Brand: TECNIS IOL

## 2024-08-08 RX ORDER — LIDOCAINE 40 MG/G
CREAM TOPICAL
Status: DISCONTINUED | OUTPATIENT
Start: 2024-08-08 | End: 2024-08-09 | Stop reason: HOSPADM

## 2024-08-08 RX ORDER — TIMOLOL MALEATE 5 MG/ML
SOLUTION/ DROPS OPHTHALMIC PRN
Status: DISCONTINUED | OUTPATIENT
Start: 2024-08-08 | End: 2024-08-08 | Stop reason: HOSPADM

## 2024-08-08 RX ORDER — DICLOFENAC SODIUM 1 MG/ML
1 SOLUTION/ DROPS OPHTHALMIC
Status: COMPLETED | OUTPATIENT
Start: 2024-08-08 | End: 2024-08-08

## 2024-08-08 RX ORDER — BALANCED SALT SOLUTION 6.4; .75; .48; .3; 3.9; 1.7 MG/ML; MG/ML; MG/ML; MG/ML; MG/ML; MG/ML
SOLUTION OPHTHALMIC PRN
Status: DISCONTINUED | OUTPATIENT
Start: 2024-08-08 | End: 2024-08-08 | Stop reason: HOSPADM

## 2024-08-08 RX ORDER — FENTANYL CITRATE 50 UG/ML
25 INJECTION, SOLUTION INTRAMUSCULAR; INTRAVENOUS EVERY 5 MIN PRN
Status: DISCONTINUED | OUTPATIENT
Start: 2024-08-08 | End: 2024-08-09 | Stop reason: HOSPADM

## 2024-08-08 RX ORDER — NALOXONE HYDROCHLORIDE 0.4 MG/ML
0.1 INJECTION, SOLUTION INTRAMUSCULAR; INTRAVENOUS; SUBCUTANEOUS
Status: DISCONTINUED | OUTPATIENT
Start: 2024-08-08 | End: 2024-08-09 | Stop reason: HOSPADM

## 2024-08-08 RX ORDER — DEXAMETHASONE SODIUM PHOSPHATE 4 MG/ML
4 INJECTION, SOLUTION INTRA-ARTICULAR; INTRALESIONAL; INTRAMUSCULAR; INTRAVENOUS; SOFT TISSUE
Status: DISCONTINUED | OUTPATIENT
Start: 2024-08-08 | End: 2024-08-09 | Stop reason: HOSPADM

## 2024-08-08 RX ORDER — TETRACAINE HYDROCHLORIDE 5 MG/ML
SOLUTION OPHTHALMIC PRN
Status: DISCONTINUED | OUTPATIENT
Start: 2024-08-08 | End: 2024-08-08 | Stop reason: HOSPADM

## 2024-08-08 RX ORDER — MOXIFLOXACIN 5 MG/ML
1 SOLUTION/ DROPS OPHTHALMIC
Status: COMPLETED | OUTPATIENT
Start: 2024-08-08 | End: 2024-08-08

## 2024-08-08 RX ORDER — SODIUM CHLORIDE, SODIUM LACTATE, POTASSIUM CHLORIDE, CALCIUM CHLORIDE 600; 310; 30; 20 MG/100ML; MG/100ML; MG/100ML; MG/100ML
INJECTION, SOLUTION INTRAVENOUS CONTINUOUS
Status: DISCONTINUED | OUTPATIENT
Start: 2024-08-08 | End: 2024-08-09 | Stop reason: HOSPADM

## 2024-08-08 RX ORDER — CYCLOPENTOLAT/TROPIC/PHENYLEPH 1%-1%-2.5%
1 DROPS (EA) OPHTHALMIC (EYE)
Status: COMPLETED | OUTPATIENT
Start: 2024-08-08 | End: 2024-08-08

## 2024-08-08 RX ORDER — ONDANSETRON 4 MG/1
4 TABLET, ORALLY DISINTEGRATING ORAL EVERY 30 MIN PRN
Status: DISCONTINUED | OUTPATIENT
Start: 2024-08-08 | End: 2024-08-09 | Stop reason: HOSPADM

## 2024-08-08 RX ORDER — MOXIFLOXACIN IN NACL,ISO-OS/PF 0.3MG/0.3
SYRINGE (ML) INTRAOCULAR PRN
Status: DISCONTINUED | OUTPATIENT
Start: 2024-08-08 | End: 2024-08-08 | Stop reason: HOSPADM

## 2024-08-08 RX ORDER — FENTANYL CITRATE 50 UG/ML
50 INJECTION, SOLUTION INTRAMUSCULAR; INTRAVENOUS EVERY 5 MIN PRN
Status: DISCONTINUED | OUTPATIENT
Start: 2024-08-08 | End: 2024-08-09 | Stop reason: HOSPADM

## 2024-08-08 RX ORDER — ONDANSETRON 2 MG/ML
4 INJECTION INTRAMUSCULAR; INTRAVENOUS EVERY 30 MIN PRN
Status: DISCONTINUED | OUTPATIENT
Start: 2024-08-08 | End: 2024-08-09 | Stop reason: HOSPADM

## 2024-08-08 RX ORDER — PROPARACAINE HYDROCHLORIDE 5 MG/ML
1 SOLUTION/ DROPS OPHTHALMIC
Status: DISCONTINUED | OUTPATIENT
Start: 2024-08-08 | End: 2024-08-09 | Stop reason: HOSPADM

## 2024-08-08 RX ORDER — ACETAMINOPHEN 325 MG/1
975 TABLET ORAL ONCE
Status: DISCONTINUED | OUTPATIENT
Start: 2024-08-08 | End: 2024-08-09 | Stop reason: HOSPADM

## 2024-08-08 RX ADMIN — Medication 1 DROP: at 07:40

## 2024-08-08 RX ADMIN — MOXIFLOXACIN 1 DROP: 5 SOLUTION/ DROPS OPHTHALMIC at 07:35

## 2024-08-08 RX ADMIN — MOXIFLOXACIN 1 DROP: 5 SOLUTION/ DROPS OPHTHALMIC at 07:40

## 2024-08-08 RX ADMIN — PROPARACAINE HYDROCHLORIDE 1 DROP: 5 SOLUTION/ DROPS OPHTHALMIC at 07:26

## 2024-08-08 RX ADMIN — DICLOFENAC SODIUM 1 DROP: 1 SOLUTION/ DROPS OPHTHALMIC at 07:27

## 2024-08-08 RX ADMIN — DICLOFENAC SODIUM 1 DROP: 1 SOLUTION/ DROPS OPHTHALMIC at 07:35

## 2024-08-08 RX ADMIN — MOXIFLOXACIN 1 DROP: 5 SOLUTION/ DROPS OPHTHALMIC at 07:27

## 2024-08-08 RX ADMIN — Medication 1 DROP: at 07:35

## 2024-08-08 RX ADMIN — DICLOFENAC SODIUM 1 DROP: 1 SOLUTION/ DROPS OPHTHALMIC at 07:40

## 2024-08-08 RX ADMIN — Medication 1 DROP: at 07:27

## 2024-08-08 ASSESSMENT — TONOMETRY
IOP_METHOD: TONOPEN
OS_IOP_MMHG: 23

## 2024-08-08 ASSESSMENT — SLIT LAMP EXAM - LIDS
COMMENTS: NORMAL
COMMENTS: NORMAL

## 2024-08-08 ASSESSMENT — VISUAL ACUITY
OS_SC: 20/100
OD_SC: 20/20
METHOD: SNELLEN - LINEAR
OD_SC+: -1

## 2024-08-08 NOTE — PROGRESS NOTES
Review of systems for the eyes was negative other than the pertinent positives/negatives listed in the HPI.      Assessment & Plan    HPI:  Pedrito Negron is a 68 year old male with history of HLD, HTN, CAD, GERD, myopia with astigmatism and presbyopia presents for Postoperative day 0 left eye      POHx: myopia with astigmatism and presbyopia  PMHx: HLD, HTN, CAD, GERD  Current Medications:   Current Outpatient Medications   Medication Sig Dispense Refill    allopurinol (ZYLOPRIM) 100 MG tablet Take 1 tablet (100 mg) by mouth daily 90 tablet 3    aspirin (ASA) 81 MG EC tablet Take 81 mg by mouth daily      atorvastatin (LIPITOR) 80 MG tablet TAKE 1 TABLET(80 MG) BY MOUTH DAILY 90 tablet 3    Cholecalciferol (VITAMIN D3 PO) Take by mouth daily      ezetimibe (ZETIA) 10 MG tablet Take 1 tablet (10 mg) by mouth daily 90 tablet 0    isosorbide mononitrate (IMDUR) 30 MG 24 hr tablet Take 1 tablet (30 mg) by mouth daily 90 tablet 3    ketorolac (ACULAR) 0.5 % ophthalmic solution Place 1 drop Into the left eye 4 times daily for 7 days, THEN 1 drop 3 times daily for 7 days, THEN 1 drop 2 times daily for 7 days, THEN 1 drop daily for 7 days. Start 2 days prior to surgery. 5 mL 0    losartan (COZAAR) 25 MG tablet Take 1 tablet (25 mg) by mouth daily 90 tablet 1    metoprolol succinate ER (TOPROL XL) 50 MG 24 hr tablet Take 1 tablet (50 mg) by mouth daily 90 tablet 3    moxifloxacin (VIGAMOX) 0.5 % ophthalmic solution Place 1 drop Into the left eye 4 times daily for 7 days Start after surgery. 3 mL 0    Multiple Vitamin (MULTI-VITAMINS) TABS Take 1 tablet by mouth daily Centrum Men's 50 +      nitroGLYcerin (NITROSTAT) 0.4 MG sublingual tablet ONE TABLET UNDER TONGUE AS NEEDED FOR CHEST PAIN- IF NO RELIEF AFTER 5 MINUTES CALL 911; USE 1 TABLET EVERY 5 MINUTES- MAX OF 3 TABLETS 25 tablet 1    nortriptyline (PAMELOR) 50 MG capsule Take 1 capsule (50 mg) by mouth at bedtime 90 capsule 3    omeprazole (PRILOSEC) 20 MG   capsule Take 20 mg by mouth 2 times daily      prednisoLONE acetate (PRED FORTE) 1 % ophthalmic suspension Place 1 drop Into the left eye 4 times daily for 7 days, THEN 1 drop 3 times daily for 7 days, THEN 1 drop 2 times daily for 7 days, THEN 1 drop daily for 7 days. Start after surgery. 5 mL 0    pregabalin (LYRICA) 50 MG capsule Take 1 capsule (50 mg) by mouth 3 times daily 90 capsule 5     No current facility-administered medications for this visit.     Facility-Administered Medications Ordered in Other Visits   Medication Dose Route Frequency Provider Last Rate Last Admin    acetaminophen (TYLENOL) tablet 975 mg  975 mg Oral Once Rian Garcia MD        dexAMETHasone (DECADRON) injection 4 mg  4 mg Intravenous Once PRN Rian Garcia MD        fentaNYL (PF) (SUBLIMAZE) injection 25 mcg  25 mcg Intravenous Q5 Min PRN Rian Garcia MD        fentaNYL (PF) (SUBLIMAZE) injection 50 mcg  50 mcg Intravenous Q5 Min PRN Rian Garcia MD        HYDROmorphone (DILAUDID) injection 0.2 mg  0.2 mg Intravenous Q5 Min PRN Rian Garcia MD        HYDROmorphone (DILAUDID) injection 0.4 mg  0.4 mg Intravenous Q5 Min PRN Rian Garcia MD        lactated ringers infusion   Intravenous Continuous Rian Garcia MD        lactated ringers infusion   Intravenous Continuous Rian Garcia MD        lactated ringers infusion   Intravenous Continuous Esteban Zamora MD        lidocaine (LMX4) kit   Topical Q1H PRN Rian Garcia MD        lidocaine 1 % 0.1-1 mL  0.1-1 mL Other Q1H PRN Rian Garcia MD        naloxone (NARCAN) injection 0.1 mg  0.1 mg Intravenous Q2 Min PRN Rian Garcia MD        ondansetron (ZOFRAN ODT) ODT tab 4 mg  4 mg Oral Q30 Min PRN Rian Garcia MD        Or    ondansetron (ZOFRAN) injection 4 mg  4 mg Intravenous Q30 Min PRN Rian Garcia MD        prochlorperazine (COMPAZINE) injection 5 mg  5 mg Intravenous Q6H PRN  Rian Garcia MD        proparacaine (ALCAINE) 0.5 % ophthalmic solution 1 drop  1 drop Ophthalmic q5 Min Prior to Surgery Esteban Zamora MD   1 drop at 08/08/24 0726    sodium chloride (PF) 0.9% PF flush 3 mL  3 mL Intracatheter Q8H Rian Garcia MD        sodium chloride (PF) 0.9% PF flush 3 mL  3 mL Intracatheter q1 min prn Rian Garcia MD         FHx: mom-armd  PSHx: Cataract extraction/intraocular lens Zamora right eye 07/25/24, left eye 08/08/24      Current Eye Medications:  pred forte/ketorolac two times a day right eye     Assessment & Plan:  Pseudophakia, left eye, day 0  Zamora left eye 08/08/24   Doing well  Keep patch in place at night for 7 days  Start post-operative drops and taper according to instructions  Post-operative do's and don'ts reviewed, questions answered    Recheck 1 week    Pseudophakia, right eye  Zamora right eye 07/25/24   Patient appears at proper alignment  at ~4 degrees with some anterior displacement of the inferior edge of the lens anterior to the rhexis.       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        Esteban Zamora MD     Attending Physician Attestation:  Complete documentation of historical and exam elements from today's encounter can be found in the full encounter summary report (not reduplicated in this progress note).  I personally obtained the chief complaint(s) and history of present illness.  I confirmed and edited as necessary the review of systems, past medical/surgical history, family history, social history, and examination findings as documented by others; and I examined the patient myself.  I personally reviewed the relevant tests, images, and reports as  documented above.  I formulated and edited as necessary the assessment and plan and discussed the findings and management plan with the patient and family. - Esteban Zamora MD

## 2024-08-08 NOTE — OP NOTE
PREOPERATIVE DIAGNOSIS: Visually significant cataract, Left eye   POSTOPERATIVE DIAGNOSIS: Same   PROCEDURES:   1. Cataract extraction with intraocular lens implant Left eye.  SURGEON: Esteban Zamora M.D.  INDICATIONS: The patient Hong Negron presented to the eye clinic with decreased vision secondary to cataract in the Left eye. The risks, benefits and alternatives to cataract extraction were discussed. The patient elected to proceed. All questions were answered to the patient's satisfaction.   DESCRIPTION OF PROCEDURE:   Prior to the procedure, appropriate cardiac and respiratory monitors were applied to the patient.  A drop of topical tetracaine was placed.  A hong at the 90-degree axis was placed using a gentian violet marker while the patient was seated upright and marked at 167 degrees using the Robomarker.  The patient was brought to the operating room where a surgical pause was carried out to identify with all members of the surgical team the correct surgical site.  With adequate anesthesia, the Left eye was prepped and draped in the usual sterile fashion. A lid speculum was placed, and the operating microscope was rotated into position. A paracentesis was created.  Through this limbal paracentesis, the anterior chamber was filled with preservative-free lidocaine followed by viscoelastic.  A temporal wound was created at the limbus using a 2.6 mm blade. A capsulorrhexis was initiated using a bent 25-gauge needle and was completed in continuous and circular fashion using the capsulorrhexis forceps. The lens nucleus was hydrodissected using balanced salt solution.  The lens nucleus was rotated and removed using phacoemulsification in a stop and chop technique.  Residual cortical material was removed using irrigation-aspiration.  The capsular bag was reinflated to its maximal extent with cohesive viscoelastic. A Arango ring was used to confirm the hong at 167 degrees. A 15.0 diopter [id number] was inserted  into the capsular bag.  The lens power selected was reviewed using the intraocular lens power measurements that were obtained preoperatively to confirm that the correct lens was selected for the desired post-operative refractive state. The residual viscoelastic was removed in its entirety, the wounds were hydrated and found to be self-sealing.  Intracameral moxifloxacin was administered. Tactile pressure was confirmed to be in a normal range.  The lid speculum was removed and a patch and shield were applied.   The patient tolerated the procedure well, and there were no complications.    PLAN: The patient will be discharged to home and will follow up today  EBL:  None  Complications:  None  Implant Name Type Inv. Item Serial No.  Lot No. LRB No. Used Action   Raoul Toric II IOL [id number] +15.0   [id number]   Left 1 Implanted

## 2024-08-13 ENCOUNTER — OFFICE VISIT (OUTPATIENT)
Dept: OPHTHALMOLOGY | Facility: CLINIC | Age: 69
End: 2024-08-13
Payer: COMMERCIAL

## 2024-08-13 DIAGNOSIS — Z96.1 PSEUDOPHAKIA, BOTH EYES: Primary | ICD-10-CM

## 2024-08-13 PROCEDURE — 99024 POSTOP FOLLOW-UP VISIT: CPT | Performed by: OPHTHALMOLOGY

## 2024-08-13 ASSESSMENT — REFRACTION_MANIFEST
OD_SPHERE: -1.00
OS_SPHERE: PLANO
METHOD_AUTOREFRACTION: 1
OS_CYLINDER: +0.50
OS_AXIS: 138
OD_AXIS: 077
OD_CYLINDER: +0.75

## 2024-08-13 ASSESSMENT — VISUAL ACUITY
OS_SC+: -1
OD_SC+: -1
METHOD: SNELLEN - LINEAR
OS_SC: 20/30
OD_SC: 20/20

## 2024-08-13 ASSESSMENT — SLIT LAMP EXAM - LIDS
COMMENTS: NORMAL
COMMENTS: NORMAL

## 2024-08-13 ASSESSMENT — TONOMETRY
IOP_METHOD: ICARE
OS_IOP_MMHG: 12
OD_IOP_MMHG: 14

## 2024-08-13 NOTE — PROGRESS NOTES
HPI       Post Op (Ophthalmology) Left Eye    In left eye.  Since onset it is stable.  Associated symptoms include Negative for eye pain, flashes and floaters.  Treatments tried include eye drops.             Comments    Here for 1 week post op left eye. VA is gradually improving. No pain. Compliant with drops.    Nick MCKENNA 1:09 PM August 13, 2024             Last edited by Nick Amezcua on 8/13/2024  1:09 PM.             Review of systems for the eyes was negative other than the pertinent positives/negatives listed in the HPI.      Assessment & Plan    HPI:  Pedrito Negron is a 68 year old male with history of HLD, HTN, CAD, GERD, myopia with astigmatism and presbyopia presents for Postoperative week 1 left eye      POHx: myopia with astigmatism and presbyopia  PMHx: HLD, HTN, CAD, GERD  Current Medications:   Current Outpatient Medications   Medication Sig Dispense Refill    allopurinol (ZYLOPRIM) 100 MG tablet Take 1 tablet (100 mg) by mouth daily 90 tablet 3    aspirin (ASA) 81 MG EC tablet Take 81 mg by mouth daily      atorvastatin (LIPITOR) 80 MG tablet TAKE 1 TABLET(80 MG) BY MOUTH DAILY 90 tablet 3    Cholecalciferol (VITAMIN D3 PO) Take by mouth daily      ezetimibe (ZETIA) 10 MG tablet Take 1 tablet (10 mg) by mouth daily 90 tablet 0    isosorbide mononitrate (IMDUR) 30 MG 24 hr tablet Take 1 tablet (30 mg) by mouth daily 90 tablet 3    ketorolac (ACULAR) 0.5 % ophthalmic solution Place 1 drop Into the left eye 4 times daily for 7 days, THEN 1 drop 3 times daily for 7 days, THEN 1 drop 2 times daily for 7 days, THEN 1 drop daily for 7 days. Start 2 days prior to surgery. 5 mL 0    losartan (COZAAR) 25 MG tablet Take 1 tablet (25 mg) by mouth daily 90 tablet 1    metoprolol succinate ER (TOPROL XL) 50 MG 24 hr tablet Take 1 tablet (50 mg) by mouth daily 90 tablet 3    moxifloxacin (VIGAMOX) 0.5 % ophthalmic solution Place 1 drop Into the left eye 4 times daily for 7 days Start after surgery. 3 mL 0     Multiple Vitamin (MULTI-VITAMINS) TABS Take 1 tablet by mouth daily Centrum Men's 50 +      nitroGLYcerin (NITROSTAT) 0.4 MG sublingual tablet ONE TABLET UNDER TONGUE AS NEEDED FOR CHEST PAIN- IF NO RELIEF AFTER 5 MINUTES CALL 911; USE 1 TABLET EVERY 5 MINUTES- MAX OF 3 TABLETS 25 tablet 1    nortriptyline (PAMELOR) 50 MG capsule Take 1 capsule (50 mg) by mouth at bedtime 90 capsule 3    omeprazole (PRILOSEC) 20 MG DR capsule Take 20 mg by mouth 2 times daily      prednisoLONE acetate (PRED FORTE) 1 % ophthalmic suspension Place 1 drop Into the left eye 4 times daily for 7 days, THEN 1 drop 3 times daily for 7 days, THEN 1 drop 2 times daily for 7 days, THEN 1 drop daily for 7 days. Start after surgery. 5 mL 0    pregabalin (LYRICA) 50 MG capsule Take 1 capsule (50 mg) by mouth 3 times daily 90 capsule 5     No current facility-administered medications for this visit.     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        Esteban Zamora MD     Attending Physician Attestation:  Complete documentation of historical and exam elements from today's encounter can be found in the full encounter summary report (not reduplicated in this progress note).  I personally obtained the chief complaint(s) and history of present illness.  I confirmed and edited as necessary the  review of systems, past medical/surgical history, family history, social history, and examination findings as documented by others; and I examined the patient myself.  I personally reviewed the relevant tests, images, and reports as documented above.  I formulated and edited as necessary the assessment and plan and discussed the findings and management plan with the patient and family. - Esteban Zamora MD

## 2024-08-21 ENCOUNTER — MYC MEDICAL ADVICE (OUTPATIENT)
Dept: FAMILY MEDICINE | Facility: CLINIC | Age: 69
End: 2024-08-21
Payer: COMMERCIAL

## 2024-08-21 DIAGNOSIS — I49.9 CARDIAC ARRHYTHMIA, UNSPECIFIED CARDIAC ARRHYTHMIA TYPE: Primary | ICD-10-CM

## 2024-08-22 ENCOUNTER — ORDERS ONLY (AUTO-RELEASED) (OUTPATIENT)
Dept: FAMILY MEDICINE | Facility: CLINIC | Age: 69
End: 2024-08-22
Payer: COMMERCIAL

## 2024-08-22 DIAGNOSIS — I49.9 CARDIAC ARRHYTHMIA, UNSPECIFIED CARDIAC ARRHYTHMIA TYPE: ICD-10-CM

## 2024-08-27 NOTE — ANESTHESIA PREPROCEDURE EVALUATION
Anesthesia Pre-Procedure Evaluation    Patient: Pedrito Rankinng   MRN: [id number] : 1955        Procedure : Procedure(s):  PHACOEMULSIFICATION, CATARACT, WITH INTRAOCULAR LENS IMPLANT          Past Medical History:   Diagnosis Date    Diverticulitis     Gout 2023    Heart disease     Hypertension       Past Surgical History:   Procedure Laterality Date    ABDOMEN SURGERY      CARDIAC SURGERY      COLONOSCOPY      COLONOSCOPY WITH CO2 INSUFFLATION N/A 2020    Procedure: COLONOSCOPY, WITH CO2 INSUFFLATION;  Surgeon: Melinda Saleem MD;  Location: [facility] OR    large bowel resection      diverticulitis    PHACOEMULSIFICATION CLEAR CORNEA WITH STANDARD INTRAOCULAR LENS IMPLANT Right 2024    Procedure: PHACOEMULSIFICATION, CATARACT, WITH INTRAOCULAR LENS IMPLANT;  Surgeon: Esteban Zamora MD;  Location: [facility] OR    PHACOEMULSIFICATION CLEAR CORNEA WITH STANDARD INTRAOCULAR LENS IMPLANT Left 2024    Procedure: PHACOEMULSIFICATION, CATARACT, WITH INTRAOCULAR LENS IMPLANT;  Surgeon: Esteban Zamora MD;  Location: [facility] OR    STENT      cardiac      No Known Allergies   Social History     Tobacco Use    Smoking status: Former     Current packs/day: 0.00     Average packs/day: 1 pack/day for 30.0 years (30.0 ttl pk-yrs)     Types: Cigarettes     Start date: 1972     Quit date: 2005     Years since quittin.6     Passive exposure: Past    Smokeless tobacco: Never   Substance Use Topics    Alcohol use: Yes     Comment: 7 beers per week       Wt Readings from Last 1 Encounters:   24 102 kg (224 lb 13.9 oz)              OUTSIDE LABS:  CBC:   Lab Results   Component Value Date    WBC 6.9 2023    WBC 7.6 2023    HGB 13.8 2023    HGB 14.8 2023    HCT 41.3 2023    HCT 43.7 2023     2023     2023     BMP:   Lab Results   Component Value Date     2024     2024    POTASSIUM  "4.7 07/16/2024    POTASSIUM 4.3 03/04/2024    CHLORIDE 105 07/16/2024    CHLORIDE 104 03/04/2024    CO2 27 07/16/2024    CO2 28 03/04/2024    BUN 19.2 07/16/2024    BUN 20.0 03/04/2024    CR 1.26 (H) 07/16/2024    CR 1.24 (H) 03/04/2024    GLC 85 07/16/2024    GLC 81 03/04/2024     COAGS: No results found for: \"PTT\", \"INR\", \"FIBR\"  POC: No results found for: \"BGM\", \"HCG\", \"HCGS\"  HEPATIC:   Lab Results   Component Value Date    ALBUMIN 4.4 11/14/2023    PROTTOTAL 7.6 06/08/2023    ALT 21 06/08/2023    AST 29 06/08/2023    ALKPHOS 101 06/08/2023    BILITOTAL 0.6 06/08/2023     OTHER:   Lab Results   Component Value Date    A1C 5.6 06/17/2022    PUSHPA 9.6 07/16/2024    PHOS 4.0 11/14/2023    TSH 1.98 08/19/2020       Anesthesia Plan       - Procedure: Procedure only, no anesthetic delivered                    Consents            Postoperative Care            Comments:               Naresh Spence MD    I have reviewed the pertinent notes and labs in the chart from the past 30 days and (re)examined the patient.  Any updates or changes from those notes are reflected in this note.             # Drug Induced Platelet Defect: home medication list includes an antiplatelet medication      "

## 2024-09-02 DIAGNOSIS — M10.9 GOUTY ARTHRITIS OF RIGHT GREAT TOE: ICD-10-CM

## 2024-09-02 RX ORDER — ALLOPURINOL 100 MG/1
100 TABLET ORAL DAILY
Qty: 90 TABLET | Refills: 3 | OUTPATIENT
Start: 2024-09-02

## 2024-09-03 ENCOUNTER — OFFICE VISIT (OUTPATIENT)
Dept: OPHTHALMOLOGY | Facility: CLINIC | Age: 69
End: 2024-09-03
Payer: COMMERCIAL

## 2024-09-03 DIAGNOSIS — H52.4 PRESBYOPIA: Primary | ICD-10-CM

## 2024-09-03 DIAGNOSIS — Z96.1 PSEUDOPHAKIA, BOTH EYES: ICD-10-CM

## 2024-09-03 PROCEDURE — 92015 DETERMINE REFRACTIVE STATE: CPT | Performed by: OPHTHALMOLOGY

## 2024-09-03 PROCEDURE — 99024 POSTOP FOLLOW-UP VISIT: CPT | Performed by: OPHTHALMOLOGY

## 2024-09-03 ASSESSMENT — REFRACTION_MANIFEST
OS_CYLINDER: SPHERE
OD_CYLINDER: SPHERE
OD_SPHERE: PLANO
OS_ADD: +2.50
OS_SPHERE: PLANO
OD_ADD: +2.50

## 2024-09-03 ASSESSMENT — VISUAL ACUITY
OS_SC: 20/20
OD_SC+: -3
METHOD_MR_RETINOSCOPY: 1
OD_SC: 20/15
METHOD: SNELLEN - LINEAR
OS_SC+: -2

## 2024-09-03 ASSESSMENT — SLIT LAMP EXAM - LIDS
COMMENTS: NORMAL
COMMENTS: NORMAL

## 2024-09-03 ASSESSMENT — CUP TO DISC RATIO
OD_RATIO: 0.5
OS_RATIO: 0.5

## 2024-09-03 ASSESSMENT — TONOMETRY
OS_IOP_MMHG: 18
IOP_METHOD: TONOPEN
OD_IOP_MMHG: 11

## 2024-09-03 NOTE — PROGRESS NOTES
HPI       Post Op (Ophthalmology) Both Eyes    In both eyes.  Since onset it is stable.  Associated symptoms include Negative for eye pain, flashes and floaters.  Treatments tried include artificial tears.             Comments    Here for final post op both eyes. VA doing okay - VA in left is not as sharp as left. Finished course of surgery drops. Using AT as needed. No pain.    Nick Amezcua COT 1:52 PM September 3, 2024             Last edited by Nick Amezcua on 9/3/2024  1:55 PM.             Review of systems for the eyes was negative other than the pertinent positives/negatives listed in the HPI.      Assessment & Plan    HPI:  Pedrito Negron is a 68 year old male with history of HLD, HTN, CAD, GERD, myopia with astigmatism and presbyopia presents for final post op both eyes     POHx: myopia with astigmatism and presbyopia  PMHx: HLD, HTN, CAD, GERD  Current Medications:   Current Outpatient Medications   Medication Sig Dispense Refill    allopurinol (ZYLOPRIM) 100 MG tablet Take 1 tablet (100 mg) by mouth daily 90 tablet 3    aspirin (ASA) 81 MG EC tablet Take 81 mg by mouth daily      atorvastatin (LIPITOR) 80 MG tablet TAKE 1 TABLET(80 MG) BY MOUTH DAILY 90 tablet 3    Cholecalciferol (VITAMIN D3 PO) Take by mouth daily      ezetimibe (ZETIA) 10 MG tablet Take 1 tablet (10 mg) by mouth daily 90 tablet 0    isosorbide mononitrate (IMDUR) 30 MG 24 hr tablet Take 1 tablet (30 mg) by mouth daily 90 tablet 3    ketorolac (ACULAR) 0.5 % ophthalmic solution Place 1 drop Into the left eye 4 times daily for 7 days, THEN 1 drop 3 times daily for 7 days, THEN 1 drop 2 times daily for 7 days, THEN 1 drop daily for 7 days. Start 2 days prior to surgery. 5 mL 0    losartan (COZAAR) 25 MG tablet Take 1 tablet (25 mg) by mouth daily 90 tablet 1    metoprolol succinate ER (TOPROL XL) 50 MG 24 hr tablet Take 1 tablet (50 mg) by mouth daily 90 tablet 3    Multiple Vitamin (MULTI-VITAMINS) TABS Take 1 tablet by mouth daily Centrum Men's  50 +      nitroGLYcerin (NITROSTAT) 0.4 MG sublingual tablet ONE TABLET UNDER TONGUE AS NEEDED FOR CHEST PAIN- IF NO RELIEF AFTER 5 MINUTES CALL 911; USE 1 TABLET EVERY 5 MINUTES- MAX OF 3 TABLETS 25 tablet 1    nortriptyline (PAMELOR) 50 MG capsule Take 1 capsule (50 mg) by mouth at bedtime 90 capsule 3    omeprazole (PRILOSEC) 20 MG DR capsule Take 20 mg by mouth 2 times daily      prednisoLONE acetate (PRED FORTE) 1 % ophthalmic suspension Place 1 drop Into the left eye 4 times daily for 7 days, THEN 1 drop 3 times daily for 7 days, THEN 1 drop 2 times daily for 7 days, THEN 1 drop daily for 7 days. Start after surgery. 5 mL 0    pregabalin (LYRICA) 50 MG capsule Take 1 capsule (50 mg) by mouth 3 times daily 90 capsule 5     No current facility-administered medications for this visit.     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        Esteban Zamora MD     Attending Physician Attestation:  Complete documentation of historical and exam elements from today's encounter can be found in the full encounter summary report (not reduplicated in this progress note).  I personally obtained the chief complaint(s) and history of present illness.  I confirmed and edited as necessary the review of systems, past medical/surgical history, family history, social history, and examination findings as documented by others; and I examined the patient myself.  I  personally reviewed the relevant tests, images, and reports as documented above.  I formulated and edited as necessary the assessment and plan and discussed the findings and management plan with the patient and family. - Esteban Zamora MD

## 2024-09-04 ENCOUNTER — TELEPHONE (OUTPATIENT)
Dept: OPHTHALMOLOGY | Facility: CLINIC | Age: 69
End: 2024-09-04

## 2024-09-04 NOTE — TELEPHONE ENCOUNTER
Left Voicemail (1st Attempt) for the patient to call back and schedule the following:    Appointment type: return  Provider: dr. cardoza  Return date: 9/3/2025  Specialty phone number: [phone]  Additonal Notes: Return in about 1 year (around 9/3/2025) for Annual Visit-v/t/ofe/Nayana guillen Complex   Orthopedics, Podiatry, Sports Medicine, Ent ,Eye , Audiology, Adult Endocrine & Diabetes, Nutrition & Medication Therapy Management Specialties   [facility]

## 2024-09-06 ENCOUNTER — MYC MEDICAL ADVICE (OUTPATIENT)
Dept: FAMILY MEDICINE | Facility: CLINIC | Age: 69
End: 2024-09-06

## 2024-09-06 DIAGNOSIS — M10.9 GOUTY ARTHRITIS OF RIGHT GREAT TOE: ICD-10-CM

## 2024-09-09 RX ORDER — ALLOPURINOL 100 MG/1
100 TABLET ORAL DAILY
Qty: 90 TABLET | Refills: 0 | Status: SHIPPED | OUTPATIENT
Start: 2024-09-09 | End: 2024-10-03

## 2024-09-09 NOTE — TELEPHONE ENCOUNTER
Last seen 7/10/24    Requesting refill of Allopurinol and only has a week left.    Future appt. 10/3/24    Valentina Kirby RN  [facility] - Registered Nurse  Clinic Triage [place]   September 9, 2024

## 2024-09-12 ENCOUNTER — PATIENT OUTREACH (OUTPATIENT)
Dept: CARDIOLOGY | Facility: CLINIC | Age: 69
End: 2024-09-12
Payer: COMMERCIAL

## 2024-09-12 NOTE — TELEPHONE ENCOUNTER
RN call to patient leaving detailed VM that he is due for lipid panel. If he elects to complete through cardiology to review at his upcoming appt, he is requested to return call or mychart    If pt elects to have pcp monitor, okay to not compete labs prior    Apple Fitzpatrick RN

## 2024-09-13 PROCEDURE — 93248 EXT ECG>7D<15D REV&INTERPJ: CPT | Performed by: INTERNAL MEDICINE

## 2024-09-13 NOTE — RESULT ENCOUNTER NOTE
Rakan Major,    If you have not viewed these results on [employer] within 3 days, we will use an alternative method to contact you. We will contact you via the following protocol:    - Via letter if your results are normal.  - Via phone ([phone]) if your results are abnormal.     Here are my comments about your recent results:    Your holter monitor showed frequent benign extra beats called PVCs as well as a few short runs of a rhythm called SVT.     It looks like you have an appointment coming up with cardiology next week 9/19/24. I would discuss these results with them to determine if other medications should be used to suppress them given their frequency.     It is not urgent we do anything. These are not rhythms you would need a blood thinner for.    Please call the clinic ([phone]), or message us on [employer] with any questions you may have.     Have a great day,    Dr. Cabello

## 2024-09-16 ENCOUNTER — TELEPHONE (OUTPATIENT)
Dept: FAMILY MEDICINE | Facility: CLINIC | Age: 69
End: 2024-09-16
Payer: COMMERCIAL

## 2024-09-16 NOTE — TELEPHONE ENCOUNTER
RN called patient and relayed message below. Patient verbalized understanding and all questions answered.     Palomopearl Major,     If you have not viewed these results on [employer] within 3 days, we will use an alternative method to contact you. We will contact you via the following protocol:    - Via letter if your results are normal.  - Via phone ([phone]) if your results are abnormal.     Here are my comments about your recent results:     Your holter monitor showed frequent benign extra beats called PVCs as well as a few short runs of a rhythm called SVT.     It looks like you have an appointment coming up with cardiology next week 9/19/24. I would discuss these results with them to determine if other medications should be used to suppress them given their frequency.     It is not urgent we do anything. These are not rhythms you would need a blood thinner for.     Please call the clinic ([phone]), or message us on [employer] with any questions you may have.     Have a great day,     Dr. Irineo Kirby RN  [facility] - Registered Nurse  Clinic Triage [place]   September 16, 2024

## 2024-09-19 ENCOUNTER — OFFICE VISIT (OUTPATIENT)
Dept: CARDIOLOGY | Facility: CLINIC | Age: 69
End: 2024-09-19
Payer: COMMERCIAL

## 2024-09-19 VITALS
DIASTOLIC BLOOD PRESSURE: 73 MMHG | WEIGHT: 227 LBS | BODY MASS INDEX: 32.57 KG/M2 | HEART RATE: 68 BPM | OXYGEN SATURATION: 94 % | SYSTOLIC BLOOD PRESSURE: 126 MMHG

## 2024-09-19 DIAGNOSIS — E78.5 HYPERLIPIDEMIA LDL GOAL <100: ICD-10-CM

## 2024-09-19 DIAGNOSIS — E78.5 HYPERLIPIDEMIA, ACQUIRED: ICD-10-CM

## 2024-09-19 DIAGNOSIS — I10 ESSENTIAL HYPERTENSION: ICD-10-CM

## 2024-09-19 DIAGNOSIS — I49.3 PVC'S (PREMATURE VENTRICULAR CONTRACTIONS): ICD-10-CM

## 2024-09-19 DIAGNOSIS — I20.89 STABLE ANGINA (H): ICD-10-CM

## 2024-09-19 DIAGNOSIS — I25.118 CORONARY ARTERY DISEASE OF NATIVE ARTERY OF NATIVE HEART WITH STABLE ANGINA PECTORIS (H): Primary | ICD-10-CM

## 2024-09-19 DIAGNOSIS — G25.0 ESSENTIAL TREMOR: ICD-10-CM

## 2024-09-19 DIAGNOSIS — I20.0 UNSTABLE ANGINA (H): ICD-10-CM

## 2024-09-19 DIAGNOSIS — I47.10 PAROXYSMAL SUPRAVENTRICULAR TACHYCARDIA (H): ICD-10-CM

## 2024-09-19 PROCEDURE — 99214 OFFICE O/P EST MOD 30 MIN: CPT | Mod: 24 | Performed by: INTERNAL MEDICINE

## 2024-09-19 RX ORDER — NITROGLYCERIN 0.4 MG/1
TABLET SUBLINGUAL
Qty: 25 TABLET | Refills: 1 | Status: SHIPPED | OUTPATIENT
Start: 2024-09-19

## 2024-09-19 RX ORDER — METOPROLOL SUCCINATE 50 MG/1
50 TABLET, EXTENDED RELEASE ORAL DAILY
Qty: 90 TABLET | Refills: 3 | Status: SHIPPED | OUTPATIENT
Start: 2024-09-19

## 2024-09-19 RX ORDER — ISOSORBIDE MONONITRATE 30 MG/1
30 TABLET, EXTENDED RELEASE ORAL DAILY
Qty: 90 TABLET | Refills: 3 | Status: SHIPPED | OUTPATIENT
Start: 2024-09-19

## 2024-09-19 RX ORDER — EZETIMIBE 10 MG/1
10 TABLET ORAL DAILY
Qty: 90 TABLET | Refills: 0 | Status: SHIPPED | OUTPATIENT
Start: 2024-09-19

## 2024-09-19 RX ORDER — ATORVASTATIN CALCIUM 80 MG/1
TABLET, FILM COATED ORAL
Qty: 90 TABLET | Refills: 3 | Status: SHIPPED | OUTPATIENT
Start: 2024-09-19

## 2024-09-19 ASSESSMENT — PAIN SCALES - GENERAL: PAINLEVEL: NO PAIN (0)

## 2024-09-19 NOTE — PATIENT INSTRUCTIONS
Take your medicines every day, as directed     Changes made today:  No medication changes   Let us know if symptom (chest discomfort) during exercise        Cardiology Care Coordinators:      Mallory SMITH RN     Cardiology Rooming staff:  Robina DIALLO    Phone  [phone]      Fax [phone]    To Contact us     During Business Hours:  [phone]     If you are needing refills please contact your pharmacy.     For urgent after hour care please call the [facility] at [phone] or the [facility] at [phone] and ask to speak to the cardiologist on call.            HOW TO CHECK YOUR BLOOD PRESSURE AT HOME:     Avoid eating, smoking, and exercising for at least 30 minutes before taking a reading.     Be sure you have taken your BP medication at least 2-3 hours before you check it.      Sit quietly for 10 minutes before a reading.      Sit in a chair with your feet flat on the floor. Rest your  arm on a table so that the arm cuff is at the same level as your heart.     Remain still during the reading.  Record your blood pressure and pulse in a log and bring to your next appointment.       [employer] allows you to communicate directly with your heart team through secure messaging.  [employer] can be accessed any time on your phone, computer, or tablet.  If you need assistance, we'd be happy to help!             Keep your Heart Appointments:     Follow-up in 1 year

## 2024-10-01 DIAGNOSIS — M10.9 GOUTY ARTHRITIS OF RIGHT GREAT TOE: ICD-10-CM

## 2024-10-01 DIAGNOSIS — I10 ESSENTIAL HYPERTENSION: ICD-10-CM

## 2024-10-01 DIAGNOSIS — N18.31 CHRONIC KIDNEY DISEASE, STAGE 3A (H): ICD-10-CM

## 2024-10-02 RX ORDER — LOSARTAN POTASSIUM 25 MG/1
25 TABLET ORAL DAILY
Qty: 90 TABLET | Refills: 0 | Status: SHIPPED | OUTPATIENT
Start: 2024-10-02 | End: 2024-10-03

## 2024-10-03 ENCOUNTER — OFFICE VISIT (OUTPATIENT)
Dept: FAMILY MEDICINE | Facility: CLINIC | Age: 69
End: 2024-10-03
Attending: FAMILY MEDICINE
Payer: COMMERCIAL

## 2024-10-03 VITALS
WEIGHT: 227 LBS | OXYGEN SATURATION: 94 % | TEMPERATURE: 97.7 F | BODY MASS INDEX: 31.78 KG/M2 | SYSTOLIC BLOOD PRESSURE: 116 MMHG | HEART RATE: 57 BPM | HEIGHT: 71 IN | RESPIRATION RATE: 18 BRPM | DIASTOLIC BLOOD PRESSURE: 64 MMHG

## 2024-10-03 DIAGNOSIS — Z00.00 ROUTINE GENERAL MEDICAL EXAMINATION AT A HEALTH CARE FACILITY: Primary | ICD-10-CM

## 2024-10-03 DIAGNOSIS — Z23 NEED FOR IMMUNIZATION AGAINST INFLUENZA: ICD-10-CM

## 2024-10-03 DIAGNOSIS — Z87.39 HISTORY OF GOUT: ICD-10-CM

## 2024-10-03 DIAGNOSIS — E66.811 CLASS 1 OBESITY DUE TO EXCESS CALORIES WITH SERIOUS COMORBIDITY AND BODY MASS INDEX (BMI) OF 31.0 TO 31.9 IN ADULT: ICD-10-CM

## 2024-10-03 DIAGNOSIS — I47.10 SVT (SUPRAVENTRICULAR TACHYCARDIA) (H): ICD-10-CM

## 2024-10-03 DIAGNOSIS — Z87.891 PERSONAL HISTORY OF TOBACCO USE, PRESENTING HAZARDS TO HEALTH: ICD-10-CM

## 2024-10-03 DIAGNOSIS — I10 BENIGN ESSENTIAL HYPERTENSION: ICD-10-CM

## 2024-10-03 DIAGNOSIS — E66.09 CLASS 1 OBESITY DUE TO EXCESS CALORIES WITH SERIOUS COMORBIDITY AND BODY MASS INDEX (BMI) OF 31.0 TO 31.9 IN ADULT: ICD-10-CM

## 2024-10-03 DIAGNOSIS — I21.3 ST ELEVATION MYOCARDIAL INFARCTION (STEMI), UNSPECIFIED ARTERY (H): ICD-10-CM

## 2024-10-03 DIAGNOSIS — N18.31 CHRONIC KIDNEY DISEASE, STAGE 3A (H): ICD-10-CM

## 2024-10-03 LAB
BASOPHILS # BLD AUTO: 0 10E3/UL (ref 0–0.2)
BASOPHILS NFR BLD AUTO: 0 %
EOSINOPHIL # BLD AUTO: 0.3 10E3/UL (ref 0–0.7)
EOSINOPHIL NFR BLD AUTO: 4 %
ERYTHROCYTE [DISTWIDTH] IN BLOOD BY AUTOMATED COUNT: 12.2 % (ref 10–15)
HCT VFR BLD AUTO: 43.5 % (ref 40–53)
HGB BLD-MCNC: 14.5 G/DL (ref 13.3–17.7)
IMM GRANULOCYTES # BLD: 0 10E3/UL
IMM GRANULOCYTES NFR BLD: 0 %
LYMPHOCYTES # BLD AUTO: 1.6 10E3/UL (ref 0.8–5.3)
LYMPHOCYTES NFR BLD AUTO: 21 %
MCH RBC QN AUTO: 30 PG (ref 26.5–33)
MCHC RBC AUTO-ENTMCNC: 33.3 G/DL (ref 31.5–36.5)
MCV RBC AUTO: 90 FL (ref 78–100)
MONOCYTES # BLD AUTO: 0.9 10E3/UL (ref 0–1.3)
MONOCYTES NFR BLD AUTO: 11 %
NEUTROPHILS # BLD AUTO: 5 10E3/UL (ref 1.6–8.3)
NEUTROPHILS NFR BLD AUTO: 64 %
PLATELET # BLD AUTO: 200 10E3/UL (ref 150–450)
RBC # BLD AUTO: 4.83 10E6/UL (ref 4.4–5.9)
WBC # BLD AUTO: 7.8 10E3/UL (ref 4–11)

## 2024-10-03 PROCEDURE — G0103 PSA SCREENING: HCPCS | Performed by: FAMILY MEDICINE

## 2024-10-03 PROCEDURE — 99397 PER PM REEVAL EST PAT 65+ YR: CPT | Mod: 25 | Performed by: FAMILY MEDICINE

## 2024-10-03 PROCEDURE — 90662 IIV NO PRSV INCREASED AG IM: CPT | Performed by: FAMILY MEDICINE

## 2024-10-03 PROCEDURE — 99213 OFFICE O/P EST LOW 20 MIN: CPT | Mod: 25 | Performed by: FAMILY MEDICINE

## 2024-10-03 PROCEDURE — 80061 LIPID PANEL: CPT | Performed by: FAMILY MEDICINE

## 2024-10-03 PROCEDURE — 85025 COMPLETE CBC W/AUTO DIFF WBC: CPT | Performed by: FAMILY MEDICINE

## 2024-10-03 PROCEDURE — 36415 COLL VENOUS BLD VENIPUNCTURE: CPT | Performed by: FAMILY MEDICINE

## 2024-10-03 PROCEDURE — 90471 IMMUNIZATION ADMIN: CPT | Performed by: FAMILY MEDICINE

## 2024-10-03 PROCEDURE — 84550 ASSAY OF BLOOD/URIC ACID: CPT | Performed by: FAMILY MEDICINE

## 2024-10-03 RX ORDER — ALLOPURINOL 100 MG/1
100 TABLET ORAL DAILY
Qty: 90 TABLET | Refills: 3 | Status: SHIPPED | OUTPATIENT
Start: 2024-10-03

## 2024-10-03 RX ORDER — LOSARTAN POTASSIUM 25 MG/1
25 TABLET ORAL DAILY
Qty: 90 TABLET | Refills: 3 | Status: SHIPPED | OUTPATIENT
Start: 2024-10-03

## 2024-10-03 SDOH — HEALTH STABILITY: PHYSICAL HEALTH: ON AVERAGE, HOW MANY DAYS PER WEEK DO YOU ENGAGE IN MODERATE TO STRENUOUS EXERCISE (LIKE A BRISK WALK)?: 5 DAYS

## 2024-10-03 SDOH — HEALTH STABILITY: PHYSICAL HEALTH: ON AVERAGE, HOW MANY MINUTES DO YOU ENGAGE IN EXERCISE AT THIS LEVEL?: 30 MIN

## 2024-10-03 ASSESSMENT — SOCIAL DETERMINANTS OF HEALTH (SDOH): HOW OFTEN DO YOU GET TOGETHER WITH FRIENDS OR RELATIVES?: NEVER

## 2024-10-03 ASSESSMENT — PAIN SCALES - GENERAL: PAINLEVEL: NO PAIN (0)

## 2024-10-03 NOTE — PATIENT INSTRUCTIONS
Patient Education     Call your insurance about where to get the RSV vaccine if interested.     Please call [phone] to schedule your imaging study.   Preventive Care Advice   This is general advice given by our system to help you stay healthy. However, your care team may have specific advice just for you. Please talk to your care team about your preventive care needs.  Nutrition  Eat 5 or more servings of fruits and vegetables each day.  Try wheat bread, brown rice and whole grain pasta (instead of white bread, rice, and pasta).  Get enough calcium and vitamin D. Check the label on foods and aim for 100% of the RDA (recommended daily allowance).  Lifestyle  Exercise at least 150 minutes each week  (30 minutes a day, 5 days a week).  Do muscle strengthening activities 2 days a week. These help control your weight and prevent disease.  No smoking.  Wear sunscreen to prevent skin cancer.  Have a dental exam and cleaning every 6 months.  Yearly exams  See your health care team every year to talk about:  Any changes in your health.  Any medicines your care team has prescribed.  Preventive care, family planning, and ways to prevent chronic diseases.  Shots (vaccines)   HPV shots (up to age 26), if you've never had them before.  Hepatitis B shots (up to age 59), if you've never had them before.  COVID-19 shot: Get this shot when it's due.  Flu shot: Get a flu shot every year.  Tetanus shot: Get a tetanus shot every 10 years.  Pneumococcal, hepatitis A, and RSV shots: Ask your care team if you need these based on your risk.  Shingles shot (for age 50 and up)  General health tests  Diabetes screening:  Starting at age 35, Get screened for diabetes at least every 3 years.  If you are younger than age 35, ask your care team if you should be screened for diabetes.  Cholesterol test: At age 39, start having a cholesterol test every 5 years, or more often if advised.  Bone density scan (DEXA): At age 50, ask your care team  if you should have this scan for osteoporosis (brittle bones).  Hepatitis C: Get tested at least once in your life.  STIs (sexually transmitted infections)  Before age 24: Ask your care team if you should be screened for STIs.  After age 24: Get screened for STIs if you're at risk. You are at risk for STIs (including HIV) if:  You are sexually active with more than one person.  You don't use condoms every time.  You or a partner was diagnosed with a sexually transmitted infection.  If you are at risk for HIV, ask about PrEP medicine to prevent HIV.  Get tested for HIV at least once in your life, whether you are at risk for HIV or not.  Cancer screening tests  Cervical cancer screening: If you have a cervix, begin getting regular cervical cancer screening tests starting at age 21.  Breast cancer scan (mammogram): If you've ever had breasts, begin having regular mammograms starting at age 40. This is a scan to check for breast cancer.  Colon cancer screening: It is important to start screening for colon cancer at age 45.  Have a colonoscopy test every 10 years (or more often if you're at risk) Or, ask your provider about stool tests like a FIT test every year or Cologuard test every 3 years.  To learn more about your testing options, visit:   .  For help making a decision, visit:   https://bit.ly/kw31508.  Prostate cancer screening test: If you have a prostate, ask your care team if a prostate cancer screening test (PSA) at age 55 is right for you.  Lung cancer screening: If you are a current or former smoker ages 50 to 80, ask your care team if ongoing lung cancer screenings are right for you.  For informational purposes only. Not to replace the advice of your health care provider. Copyright   2023 [employer]. All rights reserved. Clinically reviewed by the [facility]. [employer] 532393 - REV 01/24.

## 2024-10-03 NOTE — PROGRESS NOTES
Preventive Care Visit  [facility] SHAMEKA Lane MD, Family Medicine  Oct 3, 2024    Assessment & Plan   1. Routine general medical examination at a health care facility  Discussed personal health and safety. Routine screenings ordered as below. Appropriate anticipatory guidance, vaccinations, and health screening recommendations delivered according to the USPSTF and other appropriate society guidelines. Pedrito reports understanding and in agreement with this mutually agreed upon plan.    Today's Orders:  - PRIMARY CARE FOLLOW-UP SCHEDULING  - INFLUENZA HIGH DOSE, TRIVALENT, PF (FLUZONE)  - Lipid panel reflex to direct LDL Non-fasting; Future  - REVIEW OF HEALTH MAINTENANCE PROTOCOL ORDERS  - PRIMARY CARE FOLLOW-UP SCHEDULING; Future  - [place] Aorta Medicare AAA Screening; Future  - CBC with platelets and differential; Future  - PSA, screen; Future  - Uric acid; Future    2. ST elevation myocardial infarction (STEMI), unspecified artery (H)  3. SVT (supraventricular tachycardia) (H)  Normal EF. Rate controlled today on metoprolol. Did holter and echo this year. No changes by cardiology. On aspirin, statin, zetia as well. BP controlled.  - losartan (COZAAR) 25 MG tablet; Take 1 tablet (25 mg) by mouth daily.  Dispense: 90 tablet; Refill: 3    4. Chronic kidney disease, stage 3a (H)  Continue following with nephrology yearly. On losartan.  - losartan (COZAAR) 25 MG tablet; Take 1 tablet (25 mg) by mouth daily.  Dispense: 90 tablet; Refill: 3    5. Benign essential hypertension  Controlled. Refills given. No side effects.  - losartan (COZAAR) 25 MG tablet; Take 1 tablet (25 mg) by mouth daily.  Dispense: 90 tablet; Refill: 3    6. History of gout  Continue allopurinol and losartan. Refills given. Check uric acid levels.  - Uric acid; Future  - losartan (COZAAR) 25 MG tablet; Take 1 tablet (25 mg) by mouth daily.  Dispense: 90 tablet; Refill: 3    7. Personal history of tobacco use, presenting  hazards to health  >15 years of cessation. Does not qualify for lung cancer screening. AAA screening ordered.  -  Aorta Medicare AAA Screening; Future    8. Class 1 obesity due to excess calories with serious comorbidity and body mass index (BMI) of 31.0 to 31.9 in adult  Noted. Encourage diet and exercise changes for weight loss and overall health improvement.    9. Need for immunization against influenza  - INFLUENZA HIGH DOSE, TRIVALENT, PF (FLUZONE)       Follow-up Visit   Expected date:  Oct 03, 2025 (Approximate)      Follow Up Appointment Details:     Follow-up with whom?: PCP    Follow-Up for what?: Adult Preventive    How?: In Person                   Taurus Lane MD  [facility]    Disclaimer: This note consists of symbols derived from keyboarding, dictation and/or voice recognition software. As a result, there may be errors in the script that have gone undetected. Please consider this when interpreting information found in this chart.    Doron Major is a 68 year old, presenting for the following:  Physical (Blood pressure cuff checked out )        10/3/2024     4:11 PM   Additional Questions   Roomed by Rachael        [facility] Care Directive  Patient does not have a Health Care Directive or Living Will: Discussed advance care planning with patient; information given to patient to review.    HPI  Saw podiatry 4/17/24.  Both cataracts done this summer    Saw nephrology 3/5/24. Recommended 1 year follow-up. No adjustments.  Saw cardiology for SVT runs on 9/19/24. No changes made.        10/3/2024   General Health   How would you rate your overall physical health? Good   Feel stress (tense, anxious, or unable to sleep) Not at all            10/3/2024   Nutrition   Diet: Regular (no restrictions)            10/3/2024   Exercise   Days per week of moderate/strenous exercise 5 days   Average minutes spent exercising at this level 30 min            10/3/2024   Social  Factors   Frequency of gathering with friends or relatives Never   Worry food won't last until get money to buy more No   Food not last or not have enough money for food? No   Do you have housing? (Housing is defined as stable permanent housing and does not include staying ouside in a car, in a tent, in an abandoned building, in an overnight shelter, or couch-surfing.) Yes   Are you worried about losing your housing? No   Lack of transportation? No   Unable to get utilities (heat,electricity)? No      (!) SOCIAL CONNECTIONS CONCERN      10/3/2024   Fall Risk   Fallen 2 or more times in the past year? No    No   Trouble with walking or balance? No    No       Multiple values from one day are sorted in reverse-chronological order          10/3/2024   Activities of Daily Living- Home Safety   Needs help with the following daily activites None of the above   Safety concerns in the home None of the above            10/3/2024   Dental   Dentist two times every year? Yes            10/3/2024   Hearing Screening   Hearing concerns? None of the above            10/3/2024   Driving Risk Screening   Patient/family members have concerns about driving No            10/3/2024   General Alertness/Fatigue Screening   Have you been more tired than usual lately? No            10/3/2024   Urinary Incontinence Screening   Bothered by leaking urine in past 6 months No            10/3/2024   TB Screening   Were you born outside of the [place]? No            Today's PHQ-2 Score:       10/3/2024     6:08 AM   PHQ-2 ( 1999 [employer])   Q1: Little interest or pleasure in doing things 0   Q2: Feeling down, depressed or hopeless 0   PHQ-2 Score 0   Q1: Little interest or pleasure in doing things Not at all   Q2: Feeling down, depressed or hopeless Not at all   PHQ-2 Score 0           10/3/2024   Substance Use   Alcohol more than 3/day or more than 7/wk Not Applicable   Do you have a current opioid prescription? No   How severe/bad is pain from 1 to  10? 0/10 (No Pain)   Do you use any other substances recreationally? No        Social History     Tobacco Use    Smoking status: Former     Current packs/day: 0.00     Average packs/day: 1 pack/day for 30.0 years (30.0 ttl pk-yrs)     Types: Cigarettes     Start date: 1972     Quit date: 2005     Years since quittin.7     Passive exposure: Past    Smokeless tobacco: Never   Vaping Use    Vaping status: Never Used   Substance Use Topics    Alcohol use: Yes     Comment: 7 beers per week     Drug use: Not Currently     Types: Cocaine, Marijuana, Amphetamines, Hashish, LSD       Last PSA:   PSA   Date Value Ref Range Status   2021 1.01 0 - 4 ug/L Final     Comment:     Assay Method:  Chemiluminescence using Siemens Vista analyzer     Prostate Specific Antigen Screen   Date Value Ref Range Status   2023 0.92 0.00 - 4.50 ng/mL Final     ASCVD Risk   The ASCVD Risk score (Jose OGDEN, et al., 2019) failed to calculate for the following reasons:    The patient has a prior MI or stroke diagnosis    Reviewed and updated as needed this visit by Provider   Tobacco  Allergies  Meds  Problems  Med Hx  Surg Hx  Fam Hx        Social Hx Reviewed    Past Medical History:   Diagnosis Date    Diverticulitis     Gout 2023    Heart disease     Hypertension      Past Surgical History:   Procedure Laterality Date    ABDOMEN SURGERY      CARDIAC SURGERY      COLONOSCOPY      COLONOSCOPY WITH CO2 INSUFFLATION N/A 2020    Procedure: COLONOSCOPY, WITH CO2 INSUFFLATION;  Surgeon: Melinda Saleem MD;  Location: [facility] OR    large bowel resection      diverticulitis    PHACOEMULSIFICATION CLEAR CORNEA WITH STANDARD INTRAOCULAR LENS IMPLANT Right 2024    Procedure: PHACOEMULSIFICATION, CATARACT, WITH INTRAOCULAR LENS IMPLANT;  Surgeon: Esteban Zamora MD;  Location: [facility] OR    PHACOEMULSIFICATION CLEAR CORNEA WITH STANDARD INTRAOCULAR LENS IMPLANT Left 2024     Procedure: PHACOEMULSIFICATION, CATARACT, WITH INTRAOCULAR LENS IMPLANT;  Surgeon: Esteban Zamora MD;  Location: [facility] OR    STENT  2018    cardiac     Current providers sharing in care for this patient include:  Patient Care Team:  Taurus Lane MD as PCP - General (Family Medicine)  Juan A Daniels MD as Assigned Neuroscience Provider  Taurus Lane MD as Assigned PCP  Zohaib Duke MD as MD (Internal Medicine)  Terrell Palma MD as MD (Nephrology)  Taurus Lane MD as Referring Physician (Family Medicine)  Terrell Palma MD as Assigned Nephrology Provider  Sohan Agosto MD as MD (Cardiovascular Disease)  Esteban Zamora MD as Physician (Ophthalmology)  Esteban Zamora MD as Assigned Surgical Provider  Sohan Agosto MD as Assigned Heart and Vascular Provider    The following health maintenance items are reviewed in Epic and correct as of today:  Health Maintenance   Topic Date Due    RSV VACCINE (1 - Risk 60-74 years 1-dose series) Never done    AORTIC ANEURYSM SCREENING (SYSTEM ASSIGNED)  Never done    COVID-19 Vaccine (4 - 2024-25 season) 09/01/2024    LIPID  09/27/2024    HEMOGLOBIN  11/14/2024    MICROALBUMIN  11/14/2024    BMP  07/16/2025    COLORECTAL CANCER SCREENING  09/14/2025    MEDICARE ANNUAL WELLNESS VISIT  10/03/2025    ANNUAL REVIEW OF HM ORDERS  10/03/2025    FALL RISK ASSESSMENT  10/03/2025    GLUCOSE  07/16/2027    ADVANCE CARE PLANNING  10/03/2029    DTAP/TDAP/TD IMMUNIZATION (3 - Td or Tdap) 09/16/2031    HEPATITIS C SCREENING  Completed    PHQ-2 (once per calendar year)  Completed    INFLUENZA VACCINE  Completed    Pneumococcal Vaccine: 65+ Years  Completed    URINALYSIS  Completed    ZOSTER IMMUNIZATION  Completed    HPV IMMUNIZATION  Aged Out    MENINGITIS IMMUNIZATION  Aged Out    RSV MONOCLONAL ANTIBODY  Aged Out         Review of Systems  Constitutional, HEENT, cardiovascular, pulmonary, GI, ,  "musculoskeletal, neuro, skin, endocrine and psych systems are negative, except as otherwise noted.     Objective    Exam  /64   Pulse 57   Temp 97.7  F (36.5  C) (Temporal)   Resp 18   Ht 1.8 m (5' 10.87\")   Wt 103 kg (227 lb)   SpO2 94%   BMI 31.78 kg/m     Estimated body mass index is 31.78 kg/m  as calculated from the following:    Height as of this encounter: 1.8 m (5' 10.87\").    Weight as of this encounter: 103 kg (227 lb).    Physical Exam  Constitutional:       Appearance: He is obese.   HENT:      Head: Normocephalic and atraumatic.      Right Ear: Tympanic membrane, ear canal and external ear normal. There is no impacted cerumen.      Left Ear: Tympanic membrane, ear canal and external ear normal. There is no impacted cerumen.      Nose: Nose normal. No congestion.      Mouth/Throat:      Pharynx: Oropharynx is clear. No oropharyngeal exudate or posterior oropharyngeal erythema.   Eyes:      General:         Right eye: No discharge.         Left eye: No discharge.      Extraocular Movements: Extraocular movements intact.      Conjunctiva/sclera: Conjunctivae normal.      Pupils: Pupils are equal, round, and reactive to light.   Cardiovascular:      Rate and Rhythm: Normal rate and regular rhythm.      Heart sounds: Normal heart sounds. No murmur heard.     No friction rub.   Pulmonary:      Effort: Pulmonary effort is normal. No respiratory distress.      Breath sounds: Normal breath sounds. No wheezing or rhonchi.   Abdominal:      General: Abdomen is flat. There is no distension.      Palpations: Abdomen is soft. There is no mass.      Tenderness: There is no abdominal tenderness. There is no guarding.   Musculoskeletal:         General: No swelling.      Cervical back: Normal range of motion and neck supple. No tenderness.      Right lower leg: No edema.      Left lower leg: No edema.   Lymphadenopathy:      Cervical: No cervical adenopathy.   Skin:     General: Skin is warm.      Findings: " No rash.   Neurological:      General: No focal deficit present.      Mental Status: He is alert.      Cranial Nerves: No cranial nerve deficit.      Motor: No weakness.      Coordination: Coordination normal.      Gait: Gait normal.   Psychiatric:         Mood and Affect: Mood normal.         Behavior: Behavior normal.         Thought Content: Thought content normal.         Judgment: Judgment normal.         Labs: Pending        10/3/2024   Mini Cog   Clock Draw Score 2 Normal   3 Item Recall 3 objects recalled   Mini Cog Total Score 5        Signed Electronically by: Taurus Lane MD

## 2024-10-04 LAB
CHOLEST SERPL-MCNC: 129 MG/DL
FASTING STATUS PATIENT QL REPORTED: NO
HDLC SERPL-MCNC: 33 MG/DL
LDLC SERPL CALC-MCNC: 42 MG/DL
NONHDLC SERPL-MCNC: 96 MG/DL
PSA SERPL DL<=0.01 NG/ML-MCNC: 0.98 NG/ML (ref 0–4.5)
TRIGL SERPL-MCNC: 271 MG/DL
URATE SERPL-MCNC: 6.7 MG/DL (ref 3.4–7)

## 2024-10-04 NOTE — RESULT ENCOUNTER NOTE
"Rakan Major,    If you have not viewed these results on [employer] within 3 days, we will use an alternative method to contact you. We will contact you via the following protocol:    - Via letter if your results are normal.  - Via phone ([phone]) if your results are abnormal.     Here are my comments about your recent results:    Lipids - Your \"bad\" cholesterol lab results were normal. Your \"good\" cholesterol, or HDL cholesterol was low. This can be improved with exercise. Continue your atorvastatin.    PSA - Your Prostate cancer screening lab results were normal.    Your uric acid level was above goal of 6. If you get any further gout flares I would recommend increasing your allopurinol dose to 100 mg to reduce flares.      Please call the clinic ([phone]), or message us on [employer] with any questions you may have.     Have a great day,    Dr. Cabello"

## 2024-10-08 ENCOUNTER — TELEPHONE (OUTPATIENT)
Dept: FAMILY MEDICINE | Facility: CLINIC | Age: 69
End: 2024-10-08
Payer: COMMERCIAL

## 2024-10-08 NOTE — TELEPHONE ENCOUNTER
Seen by patient Pedrito Negron on 10/4/2024  5:48 PM     Valentina Kirby RN  [facility] - Registered Nurse  Cl[facility] Triage [place]   October 8, 2024

## 2024-10-08 NOTE — TELEPHONE ENCOUNTER
"Rakan Major,     If you have not viewed these results on [employer] within 3 days, we will use an alternative method to contact you. We will contact you via the following protocol:    - Via letter if your results are normal.  - Via phone ([phone]) if your results are abnormal.     Here are my comments about your recent results:     Lipids - Your \"bad\" cholesterol lab results were normal. Your \"good\" cholesterol, or HDL cholesterol was low. This can be improved with exercise. Continue your atorvastatin.     PSA - Your Prostate cancer screening lab results were normal.     Your uric acid level was above goal of 6. If you get any further gout flares I would recommend increasing your allopurinol dose to 100 mg to reduce flares.        Please call the clinic ([phone]), or message us on [employer] with any questions you may have.     Have a great day,     Dr. Cabello  "

## 2024-11-04 ENCOUNTER — ANCILLARY PROCEDURE (OUTPATIENT)
Dept: ULTRASOUND IMAGING | Facility: CLINIC | Age: 69
End: 2024-11-04
Attending: FAMILY MEDICINE
Payer: COMMERCIAL

## 2024-11-04 DIAGNOSIS — Z87.891 PERSONAL HISTORY OF TOBACCO USE, PRESENTING HAZARDS TO HEALTH: ICD-10-CM

## 2024-11-04 DIAGNOSIS — Z00.00 ROUTINE GENERAL MEDICAL EXAMINATION AT A HEALTH CARE FACILITY: ICD-10-CM

## 2024-11-04 PROCEDURE — 76706 US ABDL AORTA SCREEN AAA: CPT | Mod: GC | Performed by: RADIOLOGY

## 2024-11-04 NOTE — RESULT ENCOUNTER NOTE
Rakan Major,    If you have not viewed these results on [employer] within 3 days, we will use an alternative method to contact you. We will contact you via the following protocol:    - Via letter if your results are normal.  - Via phone ([phone]) if your results are abnormal.     Here are my comments about your recent results:    Essentially normal ultrasound of the blood vessels. No aneurysm seen.    Please call the clinic ([phone]), or message us on [employer] with any questions you may have.     Have a great day,    Dr. Cabello

## 2024-11-06 ENCOUNTER — MYC REFILL (OUTPATIENT)
Dept: CARDIOLOGY | Facility: CLINIC | Age: 69
End: 2024-11-06
Payer: COMMERCIAL

## 2024-11-06 DIAGNOSIS — E78.5 HYPERLIPIDEMIA LDL GOAL <100: ICD-10-CM

## 2024-11-06 DIAGNOSIS — I25.118 CORONARY ARTERY DISEASE OF NATIVE ARTERY OF NATIVE HEART WITH STABLE ANGINA PECTORIS (H): ICD-10-CM

## 2024-11-06 RX ORDER — EZETIMIBE 10 MG/1
10 TABLET ORAL DAILY
Qty: 90 TABLET | Refills: 2 | Status: SHIPPED | OUTPATIENT
Start: 2024-11-06

## 2024-11-06 NOTE — TELEPHONE ENCOUNTER
ezetimibe (ZETIA) 10 MG tablet       Last Written Prescription Date:  9-19-24  Last Fill Quantity: 90,   # refills: 0  Last Office Visit : 9-19-24  Future Office visit:  none   90:2

## 2024-12-09 ENCOUNTER — MYC REFILL (OUTPATIENT)
Dept: CARDIOLOGY | Facility: CLINIC | Age: 69
End: 2024-12-09
Payer: COMMERCIAL

## 2024-12-09 DIAGNOSIS — E78.5 HYPERLIPIDEMIA LDL GOAL <100: ICD-10-CM

## 2024-12-09 DIAGNOSIS — I25.118 CORONARY ARTERY DISEASE OF NATIVE ARTERY OF NATIVE HEART WITH STABLE ANGINA PECTORIS (H): ICD-10-CM

## 2024-12-09 RX ORDER — EZETIMIBE 10 MG/1
10 TABLET ORAL DAILY
Qty: 90 TABLET | Refills: 2 | Status: CANCELLED | OUTPATIENT
Start: 2024-12-09

## 2024-12-10 NOTE — TELEPHONE ENCOUNTER
ezetimibe (ZETIA) 10 MG tablet: refills on file  - Rx sent 11/6/2024 D:90 R:2  [id number] - [place]   - message sent to patient

## 2024-12-11 NOTE — PROGRESS NOTES
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  Neuropathy symptoms stable to improved.  He is off medications now and doing well.  Tremor equivocally slightly worse, remains without Parkinsonism.  Discussed options.  Will continue to monitor over time.  All questions answered.   He will reach out if nerve pain were to worsen, would resume nortriptyline if so.  Follow up in 1 year, sooner with any issues questions or changes    Bobby Daniels MD   of Neurology   [facility]      Interval history:     Now s/p cataract removal.   New cardiac data reviewed  Lyrica--struggles with TID dosing, getting refills.   Abnormal sensations, hard to describe in feet, persist.    Now off of nortriptyline too.    Tremor perhaps slightly worse  Discussed Ozempic, can discuss with Dr. Cabello    From [place], follows the basketball teams    A/P at last visit  Pedrito Negron is a very pleasant 67 year old man who is seen in follow up for familial essential tremor,  probable mild length dependent neuropathy.  His low back symptoms remain improved.  His nerve pain improved previously with nortriptyline, this also facilitates sleep for him.  He tolerated but did not derive much benefit from gabapentin which we were trying to use to treat pain/tremor previously.  Tremor is similar but after a bout of gout his foot pain/nerve pain is worse.  We will try to treat this with Lyrica as below     Continue nortriptyline 50 mg for nerve pain,  Trial of lyrica to uptitrate to 50 mg in  mg at bedtime for worsening nerve pain as instructed, can increase in between visits as needed  Follow up in 6  months, sooner with any issues questions or changes    Last cardiology note reviewed  Testing/Procedures:  I personally visualized and interpreted:  TTE 8/2/24: Normal LV/RV size/function, LVEF=55-60%. No significant valvular abnormalities. Frequent PVCs were present throughout the exam.      oPatch 9/2024: Frequent (8%) PVCs and 357 brief runs of SVT (longest 17 beats)        Assessment and Plan:   CAD s/p multiple PCIs (inferior STEMI on 12/17/2018 s/p DIMITRI to left circumflex/OM and DIMITRI*2 to the proximal and distal RCA with stable angina, well-controlled  CCS1-2 stable angina  HL, controlled  -continue ASA 81 mg daily  -continue statin (goal LDL<70)  -continue ISMN at present dose  -he plans to increase his exercise gradually (joining [employer]) which I encouraged  -he will notify us immediately for recurrence of either of his previous anginal equivalents (left-sided chest discomfort or disproportionate/resting dyspnea)     4. HTN, controlled: continue present therapy     5. PVCs, frequent (8%), asymptomatic 9/2024  6. SVT, brief, asymptomatic on ZioPatch 9/2024  Counseled regarding the generally benign nature of both of these rhythms and that the management of both (barring PVCs >10% with PVC-mediated cardiomyopathy) is symptom-driven. He is asymptomatic and has LVEF>55% on TTE 8/2024.   -no further workup or changes to therapy at present   -periodically reassess LVEF     The patient states understanding and is agreeable with plan.   I spent a total of 33 minutes on the day of the visit.   Time spent by me doing chart review, history and exam, documentation and further activities per the note           Sohan Agosto MD      Past Medical History:     Patient Active Problem List   Diagnosis    Coronary atherosclerosis    H/O acute myocardial infarction    H/O diverticulitis of colon    STEMI (ST elevation myocardial infarction) (H)    Hyperlipidemia LDL goal <100    Essential hypertension    Chronic  left-sided low back pain without sciatica    Chronic kidney disease, stage 3a (H)    Combined forms of age-related cataract of both eyes     Past Medical History:   Diagnosis Date    Diverticulitis     Gout 2023    Heart disease     Hypertension         Past Surgical History:     Past Surgical History:   Procedure Laterality Date    ABDOMEN SURGERY      CARDIAC SURGERY      COLONOSCOPY      COLONOSCOPY WITH CO2 INSUFFLATION N/A 2020    Procedure: COLONOSCOPY, WITH CO2 INSUFFLATION;  Surgeon: Melinda Saleem MD;  Location: [facility] OR    large bowel resection      diverticulitis    PHACOEMULSIFICATION CLEAR CORNEA WITH STANDARD INTRAOCULAR LENS IMPLANT Right 2024    Procedure: PHACOEMULSIFICATION, CATARACT, WITH INTRAOCULAR LENS IMPLANT;  Surgeon: Esteban Zamora MD;  Location: [facility] OR    PHACOEMULSIFICATION CLEAR CORNEA WITH STANDARD INTRAOCULAR LENS IMPLANT Left 2024    Procedure: PHACOEMULSIFICATION, CATARACT, WITH INTRAOCULAR LENS IMPLANT;  Surgeon: Esteban Zamora MD;  Location: [facility] OR    STENT      cardiac        Social History:     Social History     Tobacco Use    Smoking status: Former     Current packs/day: 0.00     Average packs/day: 1 pack/day for 30.0 years (30.0 ttl pk-yrs)     Types: Cigarettes     Start date: 1972     Quit date: 2005     Years since quittin.9     Passive exposure: Past    Smokeless tobacco: Never   Vaping Use    Vaping status: Never Used   Substance Use Topics    Alcohol use: Yes     Comment: 7 beers per week     Drug use: Not Currently     Types: Cocaine, Marijuana, Amphetamines, Hashish, LSD        Family History:     Family History   Problem Relation Age of Onset    Diabetes Sister     Cerebrovascular Disease Mother     Other Cancer Father     Diabetes Sister     Hypertension Sister     Hyperlipidemia Sister     Obesity Sister     Hyperlipidemia Brother     Cerebrovascular Disease Brother     Glaucoma No family hx  "of     Macular Degeneration No family hx of     Retinal detachment No family hx of         Medications:     Current Outpatient Medications   Medication Sig Dispense Refill    allopurinol (ZYLOPRIM) 100 MG tablet Take 1 tablet (100 mg) by mouth daily. 90 tablet 3    aspirin (ASA) 81 MG EC tablet Take 81 mg by mouth daily      atorvastatin (LIPITOR) 80 MG tablet TAKE 1 TABLET(80 MG) BY MOUTH DAILY 90 tablet 3    Cholecalciferol (VITAMIN D3 PO) Take by mouth daily      ezetimibe (ZETIA) 10 MG tablet Take 1 tablet (10 mg) by mouth daily. 90 tablet 2    isosorbide mononitrate (IMDUR) 30 MG 24 hr tablet Take 1 tablet (30 mg) by mouth daily. 90 tablet 3    losartan (COZAAR) 25 MG tablet Take 1 tablet (25 mg) by mouth daily. 90 tablet 3    metoprolol succinate ER (TOPROL XL) 50 MG 24 hr tablet Take 1 tablet (50 mg) by mouth daily. 90 tablet 3    Multiple Vitamin (MULTI-VITAMINS) TABS Take 1 tablet by mouth daily Centrum Men's 50 +      nitroGLYcerin (NITROSTAT) 0.4 MG sublingual tablet ONE TABLET UNDER TONGUE AS NEEDED FOR CHEST PAIN- IF NO RELIEF AFTER 5 MINUTES CALL 911; USE 1 TABLET EVERY 5 MINUTES- MAX OF 3 TABLETS 25 tablet 1    nortriptyline (PAMELOR) 50 MG capsule Take 1 capsule (50 mg) by mouth at bedtime 90 capsule 3    omeprazole (PRILOSEC) 20 MG DR capsule Take 20 mg by mouth 2 times daily.      pregabalin (LYRICA) 50 MG capsule Take 1 capsule (50 mg) by mouth 3 times daily 90 capsule 5     No current facility-administered medications for this visit.        Allergies:   No Known Allergies       Physical Exam:   Vitals: /78 (BP Location: Right arm, Patient Position: Sitting, Cuff Size: Adult Regular)   Pulse 67   Ht 1.803 m (5' 11\")   Wt 103 kg (227 lb)   BMI 31.66 kg/m     General Appearance: No apparent distress, well-nourished, well-groomed, pleasant      Mental Status: Alert and oriented to person, place, and time. Speech fluent and comprehension intact. No dysarthria.     Cranial Nerves:   II: " Visual fields: normal  III: Pupils: 3 mm, equal, round, reactive to light   III,IV,VI: Extraocular Movements: intact   V: Facial sensation: intact to light touch  VII: Facial strength: intact without asymmetry  VIII: Hearing: intact grossly  IX: Palate: intact         Motor Exam:   5/5 diffusely     Fine b/l action tremor is present. Tone is normal throughout.     Sensory: intact to LT/vibration today     Coordination: no dysmetria with finger-to-nose bilaterally     Reflexes: biceps, triceps, brachioradialis, patellar, and ankle jerks 1+ and symmetric.         Data: Pertinent prior to visit   Imaging--10/2022 MRI L spine     IMPRESSION:  Multilevel lumbar spondylosis and epidural lipomatosis, most  significantly resulting in severe concentric thecal sac stenosis with  at least moderate degree spinal canal narrowing at L2-3 and L3-4  resulting in clumping of cauda equina fibers. Additionally, there is   narrowing of the right lateral recess at L4-5 resulting in abutment of  the descending right L5 nerve root.        Lab:            Lab Results   Component Value Date     A1C 5.6 06/17/2022     A1C 5.1 02/19/2020      B12 WNL in 2021           The total time of this encounter today amounted to 30  minutes. This time included time spent with the patient, prep work, ordering tests, and performing post visit documentation.    The longitudinal plan of care for essential tremor, nerve pain was addressed during this visit. Due to the added complexity in care, I will continue to support Mr Negron in the subsequent management of this condition(s) and with the ongoing continuity of care of this condition(s).

## 2024-12-12 ENCOUNTER — OFFICE VISIT (OUTPATIENT)
Dept: NEUROLOGY | Facility: CLINIC | Age: 69
End: 2024-12-12
Payer: COMMERCIAL

## 2024-12-12 VITALS
DIASTOLIC BLOOD PRESSURE: 78 MMHG | WEIGHT: 227 LBS | HEIGHT: 71 IN | HEART RATE: 67 BPM | SYSTOLIC BLOOD PRESSURE: 130 MMHG | BODY MASS INDEX: 31.78 KG/M2

## 2024-12-12 DIAGNOSIS — M79.2 NERVE PAIN: ICD-10-CM

## 2024-12-12 DIAGNOSIS — G62.9 LENGTH-DEPENDENT PERIPHERAL NEUROPATHY: ICD-10-CM

## 2024-12-12 DIAGNOSIS — G25.0 ESSENTIAL TREMOR: Primary | ICD-10-CM

## 2024-12-12 NOTE — NURSING NOTE
"Pedrito Negron's goals for this visit include:   Chief Complaint   Patient presents with    RECHECK      Essential tremor // 6 Month follow up       He requests these members of his care team be copied on today's visit information: yes    PCP: Taurus Lane    Referring Provider:  Referred Self, MD  No address on file    /78 (BP Location: Right arm, Patient Position: Sitting, Cuff Size: Adult Regular)   Pulse 67   Ht 1.803 m (5' 11\")   Wt 103 kg (227 lb)   BMI 31.66 kg/m      Do you need any medication refills at today's visit? Possibly  SKYLER Hughes, SIDDHARTHA ([facility])      "

## 2024-12-12 NOTE — LETTER
12/12/2024      Pedrito Negron  [id number]  [place] [address]      Dear Colleague,    Thank you for referring your patient, Pedrito Negron, to the [facility] [place]. Please see a copy of my visit note below.    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  Neuropathy symptoms stable to improved.  He is off medications now and doing well.  Tremor equivocally slightly worse, remains without Parkinsonism.  Discussed options.  Will continue to monitor over time.  All questions answered.   He will reach out if nerve pain were to worsen, would resume nortriptyline if so.  Follow up in 1 year, sooner with any issues questions or changes    Bobby Daniels MD   of Neurology   [facility]      Interval history:     Now s/p cataract removal.   New cardiac data reviewed  Lyrica--struggles with TID dosing, getting refills.   Abnormal sensations, hard to describe in feet, persist.    Now off of nortriptyline too.    Tremor perhaps slightly worse  Discussed Ozempic, can discuss with Dr. Cabello    From [place], follows the basketball teams    A/P at last visit  Pedrito Negron is a very pleasant 67 year old man who is seen in follow up for familial essential tremor,  probable mild length dependent neuropathy.  His low back symptoms remain improved.  His nerve pain improved previously with nortriptyline, this also facilitates sleep for him.  He tolerated but did not derive much benefit from gabapentin which we were trying to use to treat pain/tremor previously.  Tremor is similar but after a bout of gout his foot pain/nerve pain is worse.  We  will try to treat this with Lyrica as below     Continue nortriptyline 50 mg for nerve pain,  Trial of lyrica to uptitrate to 50 mg in  mg at bedtime for worsening nerve pain as instructed, can increase in between visits as needed  Follow up in 6 months, sooner with any issues questions or changes    Last cardiology note reviewed  Testing/Procedures:  I personally visualized and interpreted:  TTE 8/2/24: Normal LV/RV size/function, LVEF=55-60%. No significant valvular abnormalities. Frequent PVCs were present throughout the exam.      oPatch 9/2024: Frequent (8%) PVCs and 357 brief runs of SVT (longest 17 beats)        Assessment and Plan:   CAD s/p multiple PCIs (inferior STEMI on 12/17/2018 s/p DIMITRI to left circumflex/OM and DIMITRI*2 to the proximal and distal RCA with stable angina, well-controlled  CCS1-2 stable angina  HL, controlled  -continue ASA 81 mg daily  -continue statin (goal LDL<70)  -continue ISMN at present dose  -he plans to increase his exercise gradually (joining [employer]) which I encouraged  -he will notify us immediately for recurrence of either of his previous anginal equivalents (left-sided chest discomfort or disproportionate/resting dyspnea)     4. HTN, controlled: continue present therapy     5. PVCs, frequent (8%), asymptomatic 9/2024  6. SVT, brief, asymptomatic on ZioPatch 9/2024  Counseled regarding the generally benign nature of both of these rhythms and that the management of both (barring PVCs >10% with PVC-mediated cardiomyopathy) is symptom-driven. He is asymptomatic and has LVEF>55% on TTE 8/2024.   -no further workup or changes to therapy at present   -periodically reassess LVEF     The patient states understanding and is agreeable with plan.   I spent a total of 33 minutes on the day of the visit.   Time spent by me doing chart review, history and exam, documentation and further activities per the note           Sohan Agosto MD      Past Medical History:      Patient Active Problem List   Diagnosis     Coronary atherosclerosis     H/O acute myocardial infarction     H/O diverticulitis of colon     STEMI (ST elevation myocardial infarction) (H)     Hyperlipidemia LDL goal <100     Essential hypertension     Chronic left-sided low back pain without sciatica     Chronic kidney disease, stage 3a (H)     Combined forms of age-related cataract of both eyes     Past Medical History:   Diagnosis Date     Diverticulitis      Gout 2023     Heart disease      Hypertension         Past Surgical History:     Past Surgical History:   Procedure Laterality Date     ABDOMEN SURGERY       CARDIAC SURGERY       COLONOSCOPY       COLONOSCOPY WITH CO2 INSUFFLATION N/A 2020    Procedure: COLONOSCOPY, WITH CO2 INSUFFLATION;  Surgeon: Melinda Saleem MD;  Location: [facility] OR     large bowel resection      diverticulitis     PHACOEMULSIFICATION CLEAR CORNEA WITH STANDARD INTRAOCULAR LENS IMPLANT Right 2024    Procedure: PHACOEMULSIFICATION, CATARACT, WITH INTRAOCULAR LENS IMPLANT;  Surgeon: Esteban Zamora MD;  Location: [facility] OR     PHACOEMULSIFICATION CLEAR CORNEA WITH STANDARD INTRAOCULAR LENS IMPLANT Left 2024    Procedure: PHACOEMULSIFICATION, CATARACT, WITH INTRAOCULAR LENS IMPLANT;  Surgeon: Esteban Zamora MD;  Location: [facility] OR     STENT      cardiac        Social History:     Social History     Tobacco Use     Smoking status: Former     Current packs/day: 0.00     Average packs/day: 1 pack/day for 30.0 years (30.0 ttl pk-yrs)     Types: Cigarettes     Start date: 1972     Quit date: 2005     Years since quittin.9     Passive exposure: Past     Smokeless tobacco: Never   Vaping Use     Vaping status: Never Used   Substance Use Topics     Alcohol use: Yes     Comment: 7 beers per week      Drug use: Not Currently     Types: Cocaine, Marijuana, Amphetamines, Hashish, LSD        Family History:     Family History    Problem Relation Age of Onset     Diabetes Sister      Cerebrovascular Disease Mother      Other Cancer Father      Diabetes Sister      Hypertension Sister      Hyperlipidemia Sister      Obesity Sister      Hyperlipidemia Brother      Cerebrovascular Disease Brother      Glaucoma No family hx of      Macular Degeneration No family hx of      Retinal detachment No family hx of         Medications:     Current Outpatient Medications   Medication Sig Dispense Refill     allopurinol (ZYLOPRIM) 100 MG tablet Take 1 tablet (100 mg) by mouth daily. 90 tablet 3     aspirin (ASA) 81 MG EC tablet Take 81 mg by mouth daily       atorvastatin (LIPITOR) 80 MG tablet TAKE 1 TABLET(80 MG) BY MOUTH DAILY 90 tablet 3     Cholecalciferol (VITAMIN D3 PO) Take by mouth daily       ezetimibe (ZETIA) 10 MG tablet Take 1 tablet (10 mg) by mouth daily. 90 tablet 2     isosorbide mononitrate (IMDUR) 30 MG 24 hr tablet Take 1 tablet (30 mg) by mouth daily. 90 tablet 3     losartan (COZAAR) 25 MG tablet Take 1 tablet (25 mg) by mouth daily. 90 tablet 3     metoprolol succinate ER (TOPROL XL) 50 MG 24 hr tablet Take 1 tablet (50 mg) by mouth daily. 90 tablet 3     Multiple Vitamin (MULTI-VITAMINS) TABS Take 1 tablet by mouth daily [facility]r Men's 50 +       nitroGLYcerin (NITROSTAT) 0.4 MG sublingual tablet ONE TABLET UNDER TONGUE AS NEEDED FOR CHEST PAIN- IF NO RELIEF AFTER 5 MINUTES CALL 911; USE 1 TABLET EVERY 5 MINUTES- MAX OF 3 TABLETS 25 tablet 1     nortriptyline (PAMELOR) 50 MG capsule Take 1 capsule (50 mg) by mouth at bedtime 90 capsule 3     omeprazole (PRILOSEC) 20 MG DR capsule Take 20 mg by mouth 2 times daily.       pregabalin (LYRICA) 50 MG capsule Take 1 capsule (50 mg) by mouth 3 times daily 90 capsule 5     No current facility-administered medications for this visit.        Allergies:   No Known Allergies       Physical Exam:   Vitals: /78 (BP Location: Right arm, Patient Position: Sitting, Cuff Size: Adult Regular)   " Pulse 67   Ht 1.803 m (5' 11\")   Wt 103 kg (227 lb)   BMI 31.66 kg/m     General Appearance: No apparent distress, well-nourished, well-groomed, pleasant      Mental Status: Alert and oriented to person, place, and time. Speech fluent and comprehension intact. No dysarthria.     Cranial Nerves:   II: Visual fields: normal  III: Pupils: 3 mm, equal, round, reactive to light   III,IV,VI: Extraocular Movements: intact   V: Facial sensation: intact to light touch  VII: Facial strength: intact without asymmetry  VIII: Hearing: intact grossly  IX: Palate: intact         Motor Exam:   5/5 diffusely     Fine b/l action tremor is present. Tone is normal throughout.     Sensory: intact to LT/vibration today     Coordination: no dysmetria with finger-to-nose bilaterally     Reflexes: biceps, triceps, brachioradialis, patellar, and ankle jerks 1+ and symmetric.         Data: Pertinent prior to visit   Imaging--10/2022 MRI L spine     IMPRESSION:  Multilevel lumbar spondylosis and epidural lipomatosis, most  significantly resulting in severe concentric thecal sac stenosis with  at least moderate degree spinal canal narrowing at L2-3 and L3-4  resulting in clumping of cauda equina fibers. Additionally, there is   narrowing of the right lateral recess at L4-5 resulting in abutment of  the descending right L5 nerve root.        Lab:            Lab Results   Component Value Date     A1C 5.6 06/17/2022     A1C 5.1 02/19/2020      B12 WNL in 2021           The total time of this encounter today amounted to 30  minutes. This time included time spent with the patient, prep work, ordering tests, and performing post visit documentation.    The longitudinal plan of care for essential tremor, nerve pain was addressed during this visit. Due to the added complexity in care, I will continue to support Mr Negron in the subsequent management of this condition(s) and with the ongoing continuity of care of this condition(s).      Again, " thank you for allowing me to participate in the care of your patient.        Sincerely,        Juan A Daniels MD

## 2024-12-12 NOTE — PATIENT INSTRUCTIONS
You have a lot of good years left in you my friend      Ok to monitor tremor over time  Nerve pain--if worsens --let me know I would re add the nortriptyline

## 2025-02-12 ENCOUNTER — TELEPHONE (OUTPATIENT)
Dept: FAMILY MEDICINE | Facility: CLINIC | Age: 70
End: 2025-02-12

## 2025-02-12 ENCOUNTER — OFFICE VISIT (OUTPATIENT)
Dept: FAMILY MEDICINE | Facility: CLINIC | Age: 70
End: 2025-02-12
Payer: COMMERCIAL

## 2025-02-12 VITALS
TEMPERATURE: 97.9 F | SYSTOLIC BLOOD PRESSURE: 114 MMHG | BODY MASS INDEX: 29.54 KG/M2 | OXYGEN SATURATION: 95 % | HEIGHT: 71 IN | HEART RATE: 60 BPM | WEIGHT: 211 LBS | DIASTOLIC BLOOD PRESSURE: 60 MMHG | RESPIRATION RATE: 21 BRPM

## 2025-02-12 DIAGNOSIS — M77.11 RIGHT TENNIS ELBOW: ICD-10-CM

## 2025-02-12 DIAGNOSIS — M54.59 MECHANICAL LOW BACK PAIN: ICD-10-CM

## 2025-02-12 DIAGNOSIS — E66.3 OVERWEIGHT (BMI 25.0-29.9): ICD-10-CM

## 2025-02-12 DIAGNOSIS — I10 ESSENTIAL HYPERTENSION: ICD-10-CM

## 2025-02-12 DIAGNOSIS — M54.2 CERVICALGIA: Primary | ICD-10-CM

## 2025-02-12 DIAGNOSIS — E78.5 HYPERLIPIDEMIA LDL GOAL <100: ICD-10-CM

## 2025-02-12 DIAGNOSIS — M54.50 CHRONIC LEFT-SIDED LOW BACK PAIN WITHOUT SCIATICA: ICD-10-CM

## 2025-02-12 DIAGNOSIS — I25.2 H/O ACUTE MYOCARDIAL INFARCTION: ICD-10-CM

## 2025-02-12 DIAGNOSIS — G89.29 CHRONIC LEFT-SIDED LOW BACK PAIN WITHOUT SCIATICA: ICD-10-CM

## 2025-02-12 PROCEDURE — 99214 OFFICE O/P EST MOD 30 MIN: CPT | Performed by: FAMILY MEDICINE

## 2025-02-12 PROCEDURE — G2211 COMPLEX E/M VISIT ADD ON: HCPCS | Performed by: FAMILY MEDICINE

## 2025-02-12 ASSESSMENT — PAIN SCALES - GENERAL: PAINLEVEL_OUTOF10: MODERATE PAIN (5)

## 2025-02-12 NOTE — PATIENT INSTRUCTIONS
Important Takeaway Points From This Visit:     Regarding your Back Pain:    Home Care Instructions for Back Pain  - Stay optimistic, ~90% of patients' pain goes away within the first 2-4 weeks. Studies show that having a positive outlook helps prevent a prolonged course.     - Prevent Re-injury:  Avoid activities such as prolonged sitting, lifting or jumping until pain is resolved (do not stay in bed).  Return to active duty and your regular activities as soon as pain allows.    - Pain Control:  Apply heat/cold for 20 minutes 2-3 times a day, but only while awake. If one works better than the other, use that temperature.  Use a medium-firm mattress or place a board under a soft mattress.  You can take 1000 mg of tylenol up to three times daily, as long as another provider has not restricted you from taking this type of medication.  You can take 400-600 mg of Ibuprofen up to three times daily with food, as long as another provider has restricted you from taking this type of medication.  You can also try over the counter Aspercreme (lidocaine), Biofreeze (menthol), or Voltaren (diclofenac) for pain.    - Prevent Future Injury:  I have provided you with some strengthening exercises (see below) to help prevent this from happening again in the future.    - When to Follow-Up:  Worsening Pain  Pain that radiates into any arm or leg  Painful urination, blood in the urine, or increased urinary frequency.  Fever    - Seek Emergency Care Immediately if Any of the Following Occur  New onset of persistent numbness/tingling or persistent loss of bowel or bladder control or inability to urinate  Weakness in the limbs  Severe pain                                            Preventing Back Pain at Work and at Home  Almost everyone will experience back pain at some point in their life. Back pain varies greatly from one person to the next. It can range from mild to severe, and can be short-lived or long-lasting.  Unfortunately,  preventing all back pain may not be possible. We cannot avoid the normal wear and tear on our spines that goes along with aging. There are some things we can do at work and at home, however, to help reduce our risk for developing back problems.    Guidelines for Protecting Your Back  Having a healthy lifestyle is a good start to preventing back pain.  Exercise  Combine exercise, like walking or swimming, with specific exercises to keep the core muscles in your back and abdomen strong and flexible. Strengthening your core muscles will help support your spine and can help prevent back pain.  Weight Loss  Maintain a healthy weight. Being overweight puts added pressure on your spine and lower back.  Avoid Smoking  Both smoke and nicotine can cause the flexible discs that lie in between the vertebrae in your spine to degenerate, or wear out, faster than normal.  Proper Posture  Having good posture is important in avoiding low back problems. The way you stand, sit, and lift things has an impact on your spine health.    Guidelines for Proper Lifting     (Left) Wrong way to lift. (Right) Right way to lift.  Take some time to determine your plan for lifting and do not rush.  Position yourself close to the object you want to lift.  Stand with your feet shoulder-width apart to give yourself a solid base of support.  Bend at the knees.  Tighten your stomach muscles.  Lift with your leg muscles as you stand up.  If an object is too heavy or is an awkward shape, do not try to lift it by yourself. Get help.  Picking Up a Light Object  To lift a very light object from the floor, such as a piece of paper, lean over the object, slightly bend one knee and extend the other leg behind you. Hold on to a nearby chair or table for support as you reach down to grab the object.    Picking Up a Heavy Object  Whether you are lifting a heavy laundry basket or a heavy box in your garage, remember to get close to the object, bend at the knees,  and lift with your leg muscles. Do not bend at your waist.  When lifting luggage, stand alongside the luggage, bend at your knees, grasp the handle and straighten up.    Holding an Object  While you are holding the object, keep your knees slightly bent to maintain your balance. If you have to move the object to one side, avoid twisting your body. Point your toes in the direction you want to move and pivot in that direction. Keep the object close to you when moving.    Placing an Object on a Shelf  If you need to place an object on a shelf, move as close as possible to the shelf. Spread your feet in a wide stance, positioning one foot in front of the other to give you a solid base of support. Do not lean forward and do not fully extend your arms while holding the object in your hands.  If the shelf is chest high, move close to the shelf and place your feet apart and one foot forward. Lift the object chest high, keep your elbows at your side and position your hands so you can push the object up and on to the shelf. Remember to tighten your stomach muscles before lifting.    Supporting Your Back While Sitting  When sitting, keep your back in a normal, slightly arched position. Make sure your chair supports your lower back. Keep your head and shoulders erect. Make sure your working surface is at the proper height so you don't have to lean forward.  Once an hour, if possible, stand, and stretch. Place your hands on your lower back and gently arch backward.     Supporting Your Back While Sleeping  There is no evidence that one brand or type of mattress is better than another; however, it is important to choose one with a good support system. How firm a mattress should be is a matter of personal preference and depends upon your own comfort level. If you wake up in the morning with back pain, it could be a sign that your mattress is not properly supporting your lower back and spine.

## 2025-02-12 NOTE — Clinical Note
February 12, 2025      Pedrito Vizcainost  [address]   [facility] [address]        {Comm Man dear:[id number]}          Sincerely,        Taurus Lane MD    Electronically signed

## 2025-02-12 NOTE — LETTER
February 12, 2025      Pedrito Negron  [address]   [facility] [address]      Re: Medical Necessity Letter for Coverage of Wegovy (Semaglutide) for Pedrito Negron      To whom it may concern,      I am writing to appeal for coverage of Wegovy (Semaglutide) for my patient, Pedrito Negron , due to medical necessity. As his primary care provider, I strongly believe that this medication is essential for improving his health and well-being.    Pedrito Negron is a 69 year old individual with a body mass index (BMI) of 29.54, placing him in the category of Overweight. Furthermore, he suffers from comorbidities that significantly impact his quality of life and overall health. These comorbidities include hypertension, dyslipidemia, coronary artery disease, and chronic low back pain.    The rationale for requesting coverage of this medication for Pedrito Negron is as follows based on his weight and co morbidities:    Overweight: Being overweight is associated with a multitude of health risks, including cardiovascular disease, type 2 diabetes, and musculoskeletal disorders. It significantly increases the risk of premature mortality and decreases quality of life.    Hypertension: Being overweight is a major risk factor for hypertension, as excess body weight places strain on the heart and blood vessels, leading to increased blood pressure. Weight loss has been shown to significantly reduce blood pressure levels and decrease the risk of cardiovascular events in individuals with hypertension.    Dyslipidemia: Effective weight management can improve lipid profiles by lowering LDL cholesterol and triglycerides while raising HDL cholesterol, thus decreasing cardiovascular risk.    Coronary Artery Disease (CAD): Effective weight loss can significantly reduce the risk of cardiovascular events in patients with CAD. This reduction is due, in part, to improvements in cardiovascular risk factor such as hypertension, hyperlipidemia,  diabetes etc. Weight loss helps reduce oxygen demand by the heart. Finally, weight loss improves the arterial endothelial dysfunction associated with obesity which contributes to plaque formation. Furthermore, Treatment with GLP-1 medications has been approved for treatment of CAD in association with obesity after demonstrating a reduction in cardiovascular risk based on the SELECT trial.    Chronic Low Back Pain: Reducing weight can decrease strain on the spine and surrounding muscles, resulting in improved pain management and greater mobility.    Failure of Other Weight Loss Therapies: Pedrito Negron has made numerous attempts at weight loss through various diets and lifestyle modifications, all of which have been unsuccessful in achieving significant and sustainable weight loss. Despite his best efforts, he has been unable to achieve meaningful reductions in his BMI or improvement in his obesity-related health conditions.      Ineligibility for Other Therapies: Due to Pedrito Negron's hypertension and coronary artery disease, he is ineligible for other weight loss therapies such as phentermine. As a result, he requires an alternative weight loss medication that is safe and effective based on his past medical history. It also needs to be taken into account the current shortage on other GLP-1 drugs such as Saxenda making them unavailable for treatment.     Wegovy (Semaglutide) is an FDA approved medication for chronic weight management in adults with obesity, or who are overweight and have at least one weight-related comorbidity. It works by acting on the appetite control centers in the brain, leading to reduced hunger and caloric intake. Clinical trials have demonstrated its efficacy in achieving substantial and sustained weight loss, as well as improvements in cardiometabolic risk factors.    Given Pedrito Negron's BMI of 29.54, and the presence of significant weight-related comorbid conditions listed above, I  strongly believe that Wegovy (Semaglutide) is medically necessary for him. It offers a valuable opportunity to address his weight-related health issues and improve his overall health outcomes.    I kindly request that you reconsider your decision and provide coverage for Wegovy (Semaglutide) for Pedrito Negron. Your cooperation in this matter is greatly appreciated and will have a significant impact on his health and well-being.    If you require any additional information or documentation, please do not hesitate to contact me at [phone], or [email or web address].    Thank you for your attention to this matter.      Sincerely,        Taurus Lane MD  [facility]      Electronically signed

## 2025-02-12 NOTE — Clinical Note
February 12, 2025      Pedrito Vizcainost  [address]   [facility] [address]        {Comm Man dear:[id number]}          Sincerely,        Taurus Lane MD    Electronically signed

## 2025-02-12 NOTE — TELEPHONE ENCOUNTER
Prior Authorization Retail Medication Request    Medication/Dose: semaglutide-weight management (WEGOVY) 0.25 MG/0.5ML pen    Plan does not cover the above med.  Please call plan at [phone] to initiate PA or call/fax pharmacy to change med.     Insurance type and ID number: [id number]    Dane Farris, ARRMARIZOL on 2/12/2025 at 5:13 PM

## 2025-02-12 NOTE — PROGRESS NOTES
Assessment & Plan   1. Cervicalgia (Primary)  2. Mechanical low back pain  3. Right tennis elbow  Likely work related muscle strains of trapezius, paraspinal muscles, and extensor tendons. PT referral given.  - Physical Therapy  Referral; Future    4. Essential hypertension  5. Hyperlipidemia LDL goal <100  6. H/O acute myocardial infarction  7. Chronic left-sided low back pain without sciatica  8. Overweight (BMI 25.0-29.9)  Overweight and comorbid conditions including MI for which Wegovy has a FDA indication for. Will order to see if covered. Discussed use, side effects, etc.  - semaglutide-weight management (WEGOVY) 0.25 MG/0.5ML pen; Inject 0.5 mLs (0.25 mg) subcutaneously once a week.  Dispense: 2 mL; Refill: 0  - Semaglutide-Weight Management (WEGOVY) 0.5 MG/0.5ML pen; Inject 0.5 mg subcutaneously once a week.  Dispense: 2 mL; Refill: 0    Taurus Lane MD  [facility]    Disclaimer: This note consists of symbols derived from keyboarding, dictation and/or voice recognition software. As a result, there may be errors in the script that have gone undetected. Please consider this when interpreting information found in this chart.    The longitudinal plan of care for the diagnosis(es)/condition(s) as documented were addressed during this visit. Due to the added complexity in care, I will continue to support Pedrito in the subsequent management and with ongoing continuity of care.    Subjective   Pedrito is a 69 year old, presenting for the following health issues:  Generalized Body Aches        2/12/2025     2:51 PM   Additional Questions   Roomed by VICK   Accompanied by self       History of Present Illness       Reason for visit:  General aches and pains    He eats 4 or more servings of fruits and vegetables daily.He consumes 0 sweetened beverage(s) daily.He exercises with enough effort to increase his heart rate 10 to 19 minutes per day.  He exercises with enough  "effort to increase his heart rate 3 or less days per week.   He is taking medications regularly.     Would like to also discuss general aches and pains that he's noticed since turning 69. Mainly in neck with fast movements, low back, and right elbow. Drives for [employer].  Would like to discuss ozempic/wegovy. Has been maintaining weight at 208 lbs. Eating different than from a year or two ago, but not losing weight.     Hx of HLD, HTN, MI.    Review of Systems  Constitutional, HEENT, cardiovascular, pulmonary, GI, , musculoskeletal, neuro, skin, endocrine and psych systems are negative, except as otherwise noted.      Objective    /60   Pulse 60   Temp 97.9  F (36.6  C) (Temporal)   Resp 21   Ht 1.8 m (5' 10.87\")   Wt 95.7 kg (211 lb)   SpO2 95%   BMI 29.54 kg/m    Body mass index is 29.54 kg/m .  Physical Exam  Vitals reviewed.   HENT:      Head: Normocephalic and atraumatic.   Eyes:      General:         Right eye: No discharge.         Left eye: No discharge.      Extraocular Movements: Extraocular movements intact.      Conjunctiva/sclera: Conjunctivae normal.      Pupils: Pupils are equal, round, and reactive to light.   Cardiovascular:      Rate and Rhythm: Normal rate and regular rhythm.      Heart sounds: Normal heart sounds. No murmur heard.     No friction rub.   Pulmonary:      Effort: Pulmonary effort is normal. No respiratory distress.      Breath sounds: Normal breath sounds. No wheezing or rhonchi.   Musculoskeletal:         General: No swelling.        Arms:       Cervical back: Normal range of motion and neck supple. No tenderness.      Comments: Pain to palpation.   Lymphadenopathy:      Cervical: No cervical adenopathy.   Skin:     General: Skin is warm.      Findings: No rash.   Neurological:      General: No focal deficit present.      Mental Status: He is alert.      Motor: No weakness.      Coordination: Coordination normal.      Gait: Gait normal.   Psychiatric:         " Mood and Affect: Mood normal.         Behavior: Behavior normal.         Thought Content: Thought content normal.         Judgment: Judgment normal.            Labs: None        Signed Electronically by: Taurus Lane MD

## 2025-02-17 NOTE — TELEPHONE ENCOUNTER
Central Prior Authorization Team   Phone: [phone]    PA Initiation    Medication: semaglutide-weight management (WEGOVY) 0.25 MG/0.5ML pen  Insurance Company: [employer] - Phone [phone] Fax [phone]  Pharmacy Filling the Rx: [id number] - [place], [place] - [address]  Filling Pharmacy Phone: [phone]  Filling Pharmacy Fax:    Start Date: 2/17/2025    [facility] KEY: [id number]

## 2025-02-17 NOTE — TELEPHONE ENCOUNTER
PRIOR AUTHORIZATION DENIED    Medication: semaglutide-weight management (WEGOVY) 0.25 MG/0.5ML pen    Denial Date: 2/17/2025    Denial Rational:  Per insurance, medication is excluded from patient's benefit plan and will not be covered. Patient is able to use [employer], [employer] or another discount card to help with the cost of the medication. An appeal is NOT available on excluded medications. Please follow up with the patient and close encounter.   Thank You.           Appeal Information:  N/A

## 2025-03-04 ENCOUNTER — TELEPHONE (OUTPATIENT)
Dept: OPHTHALMOLOGY | Facility: CLINIC | Age: 70
End: 2025-03-04

## 2025-03-04 NOTE — TELEPHONE ENCOUNTER
Left Voicemail (2nd Attempt) for the patient to call back and schedule the following:    Appointment type: return  Provider: dr. cardoza  Return date: 9/3/2025  Specialty phone number: [phone]     Additonal Notes:   Return in about 1 year (around 9/3/2025) for Annual Visit-v/t/d/Afshin guillen Complex   Orthopedics, Podiatry, Sports Medicine, Ent ,Eye , Audiology, Adult Endocrine & Diabetes, Nutrition & Medication Therapy Management Specialties   [facility]

## 2025-06-02 ENCOUNTER — TELEPHONE (OUTPATIENT)
Dept: CARDIOLOGY | Facility: CLINIC | Age: 70
End: 2025-06-02
Payer: COMMERCIAL

## 2025-06-02 NOTE — TELEPHONE ENCOUNTER
Left Voicemail (1st Attempt) and Sent [facility] (1st Attempt) for the patient to call back and schedule the following:    Appointment type: Return Cardiology  Provider: Dr. Agosto  Return date: 12/11/2025  Specialty phone number: [phone]  Additional appointment(s) needed:   Additonal Notes:     Attempted to schedule a return cardiology appointment on 12/11/2025 with Dr. Agosto.    Also sent a [employer] message.    Talia HERNANDEZ/Complex Procedure    [facility]   Neurology, NeuroSurgery, NeuroPsychology, Pain Management and Cardiology Specialties  Medical/Surgical Adult Specialties

## 2025-06-09 ENCOUNTER — OFFICE VISIT (OUTPATIENT)
Dept: FAMILY MEDICINE | Facility: CLINIC | Age: 70
End: 2025-06-09

## 2025-06-09 VITALS
OXYGEN SATURATION: 97 % | BODY MASS INDEX: 28.42 KG/M2 | WEIGHT: 203 LBS | HEIGHT: 71 IN | HEART RATE: 66 BPM | DIASTOLIC BLOOD PRESSURE: 70 MMHG | SYSTOLIC BLOOD PRESSURE: 122 MMHG | RESPIRATION RATE: 12 BRPM | TEMPERATURE: 98 F

## 2025-06-09 DIAGNOSIS — R33.9 URINARY RETENTION: Primary | ICD-10-CM

## 2025-06-09 PROCEDURE — G2211 COMPLEX E/M VISIT ADD ON: HCPCS | Performed by: FAMILY MEDICINE

## 2025-06-09 PROCEDURE — 99213 OFFICE O/P EST LOW 20 MIN: CPT | Performed by: FAMILY MEDICINE

## 2025-06-09 ASSESSMENT — PAIN SCALES - GENERAL: PAINLEVEL_OUTOF10: NO PAIN (0)

## 2025-06-09 NOTE — PROGRESS NOTES
"Assessment & Plan   1. Urinary retention (Primary)  Forms completed for unemployment. Referral given to determine cause. Could consider trial of flomax. Is on imdur and other BP medications which may cause hypotension with addition.  - Adult Urology  Referral; Future    Taurus Lane MD  [facility]    Disclaimer: This note consists of symbols derived from keyboarding, dictation and/or voice recognition software. As a result, there may be errors in the script that have gone undetected. Please consider this when interpreting information found in this chart.    The longitudinal plan of care for the diagnosis(es)/condition(s) as documented were addressed during this visit. Due to the added complexity in care, I will continue to support Pedrito in the subsequent management and with ongoing continuity of care.    Subjective   Pedrito is a 69 year old, presenting for the following health issues:  Forms        6/9/2025     1:05 PM   Additional Questions   Roomed by Marissa Shields CMA   Accompanied by self         6/9/2025     1:05 PM   Patient Reported Additional Medications   Patient reports taking the following new medications none     History of Present Illness       Reason for visit:  Need signature for documentation   He is taking medications regularly.      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  \"Shy bladder\" and worse now as he got older he stated  In 2023 had a random drug test that he could not leave specimen but was able to leave enough sample to do test and passed  Nephrologist-Dr Baird because a cardiologist wanted blood test done and found a high level of something he could not remember what that made it look like kidney damage and saw nephrologist and since then has been triggered to not to be able to go now even at home following that \"like I can't remember how to go\"  Went to bed one " "night with full bladder and stated that now this builds up a chemical he stated that puts him in kidney disease  Drinking lots of water and feels like he has to go as bladder is full so was not able to leave sample recently and left his visit and was given a slip to see a doctor but then later that day was told he was let go from his work  Patient has also reached out to nephrologist as well  Needs form filled out and signed by [place] Unemployment insurance      Review of Systems  Constitutional, HEENT, cardiovascular, pulmonary, GI, , musculoskeletal, neuro, skin, endocrine and psych systems are negative, except as otherwise noted.      Objective    /70   Pulse 66   Temp 98  F (36.7  C) (Temporal)   Resp 12   Ht 1.8 m (5' 10.87\")   Wt 92.1 kg (203 lb)   SpO2 97%   BMI 28.42 kg/m    Body mass index is 28.42 kg/m .  Physical Exam  Vitals reviewed.   HENT:      Head: Normocephalic and atraumatic.   Eyes:      General:         Right eye: No discharge.         Left eye: No discharge.      Extraocular Movements: Extraocular movements intact.      Conjunctiva/sclera: Conjunctivae normal.      Pupils: Pupils are equal, round, and reactive to light.   Cardiovascular:      Rate and Rhythm: Normal rate and regular rhythm.      Heart sounds: Normal heart sounds. No murmur heard.     No friction rub.   Pulmonary:      Effort: Pulmonary effort is normal. No respiratory distress.      Breath sounds: Normal breath sounds. No wheezing or rhonchi.   Musculoskeletal:         General: No swelling.      Cervical back: Normal range of motion and neck supple. No tenderness.   Lymphadenopathy:      Cervical: No cervical adenopathy.   Skin:     General: Skin is warm.      Findings: No rash.   Neurological:      General: No focal deficit present.      Mental Status: He is alert.      Motor: No weakness.      Coordination: Coordination normal.      Gait: Gait normal.   Psychiatric:         Mood and Affect: Mood normal.         " Behavior: Behavior normal.         Thought Content: Thought content normal.         Judgment: Judgment normal.        Labs: None        Signed Electronically by: Taurus Lane MD

## 2025-06-10 ENCOUNTER — PATIENT OUTREACH (OUTPATIENT)
Dept: CARE COORDINATION | Facility: CLINIC | Age: 70
End: 2025-06-10

## 2025-06-12 ENCOUNTER — PATIENT OUTREACH (OUTPATIENT)
Dept: CARE COORDINATION | Facility: CLINIC | Age: 70
End: 2025-06-12

## 2025-07-14 ENCOUNTER — TELEPHONE (OUTPATIENT)
Dept: CARDIOLOGY | Facility: CLINIC | Age: 70
End: 2025-07-14
Payer: COMMERCIAL

## 2025-07-16 ENCOUNTER — TELEPHONE (OUTPATIENT)
Dept: FAMILY MEDICINE | Facility: CLINIC | Age: 70
End: 2025-07-16
Payer: COMMERCIAL

## 2025-07-16 DIAGNOSIS — G25.0 ESSENTIAL TREMOR: ICD-10-CM

## 2025-07-16 DIAGNOSIS — I25.118 CORONARY ARTERY DISEASE OF NATIVE ARTERY OF NATIVE HEART WITH STABLE ANGINA PECTORIS: ICD-10-CM

## 2025-07-16 DIAGNOSIS — I20.0 UNSTABLE ANGINA (H): ICD-10-CM

## 2025-07-16 DIAGNOSIS — I20.89 STABLE ANGINA: ICD-10-CM

## 2025-07-16 DIAGNOSIS — E78.5 HYPERLIPIDEMIA LDL GOAL <100: ICD-10-CM

## 2025-07-16 RX ORDER — EZETIMIBE 10 MG/1
10 TABLET ORAL DAILY
Qty: 90 TABLET | Refills: 1 | Status: SHIPPED | OUTPATIENT
Start: 2025-07-16

## 2025-07-16 RX ORDER — ISOSORBIDE MONONITRATE 30 MG/1
30 TABLET, EXTENDED RELEASE ORAL DAILY
Qty: 90 TABLET | Refills: 1 | Status: SHIPPED | OUTPATIENT
Start: 2025-07-16

## 2025-07-16 RX ORDER — METOPROLOL SUCCINATE 50 MG/1
50 TABLET, EXTENDED RELEASE ORAL DAILY
Qty: 90 TABLET | Refills: 1 | Status: SHIPPED | OUTPATIENT
Start: 2025-07-16

## 2025-07-16 NOTE — TELEPHONE ENCOUNTER
Medication Question or Refill  metoprolol succinate ER (TOPROL XL) 50 MG 24 hr tablet  isosorbide mononitrate (IMDUR) 30 MG 24 hr tablet   ezetimibe (ZETIA) 10 MG tablet     Pt is having difficulty getting these renewed by cardiologist      What medication are you calling about (include dose and sig)?:  SEE ABOVE    Preferred Pharmacy:     [facility]/pharmacy #[id number] - [place], [place] - [address] [facility] [address]  [id number] [place] [address]  [place] [address]  Phone: [phone] Fax: [phone]      Controlled Substance Agreement on file:   CSA -- Patient Level:    CSA: None found at the patient level.       Who prescribed the medication?: Dr. Kenyon    Do you need a refill? Yes    When did you use the medication last?  Until Saturday    Patient offered an appointment? No    Do you have any questions or concerns?  No      Could we send this information to you in [employer] or would you prefer to receive a phone call?:   Patient would like to be contacted via [employer]

## 2025-07-16 NOTE — TELEPHONE ENCOUNTER
Medication Question or Refill  metoprolol succinate ER (TOPROL XL) 50 MG 24 hr tablet  isosorbide mononitrate (IMDUR) 30 MG 24 hr tablet   ezetimibe (ZETIA) 10 MG tablet      Pt is having difficulty getting these renewed by cardiologist        What medication are you calling about (include dose and sig)?:  SEE ABOVE     Preferred Pharmacy:      [facility]/pharmacy #[id number] - [place], [place] - [address] [facility] AT [facility]  [id number] [place] [address]  [place] [address]  Phone: [phone] Fax: [phone]        Controlled Substance Agreement on file:   CSA -- Patient Level:    CSA: None found at the patient level.         Who prescribed the medication?: Dr. Kenyon     Do you need a refill? Yes     When did you use the medication last?  Until Saturday     Patient offered an appointment? No     Do you have any questions or concerns?  No        Could we send this information to you in [facility] or would you prefer to receive a phone call?:   Patient would like to be contacted via [facility]

## (undated) DEVICE — SOL WATER IRRIG 1000ML BOTTLE 07139-09

## (undated) DEVICE — PREP CHLORAPREP 26ML TINTED ORANGE  260815

## (undated) RX ORDER — REGADENOSON 0.08 MG/ML
INJECTION, SOLUTION INTRAVENOUS
Status: DISPENSED
Start: 2020-09-18

## (undated) RX ORDER — AMINOPHYLLINE 25 MG/ML
INJECTION, SOLUTION INTRAVENOUS
Status: DISPENSED
Start: 2020-09-18

## (undated) RX ORDER — ACETAMINOPHEN 325 MG/1
TABLET ORAL
Status: DISPENSED
Start: 2024-07-25

## (undated) RX ORDER — FENTANYL CITRATE 50 UG/ML
INJECTION, SOLUTION INTRAMUSCULAR; INTRAVENOUS
Status: DISPENSED
Start: 2020-09-14